# Patient Record
Sex: FEMALE | Race: WHITE | NOT HISPANIC OR LATINO | Employment: OTHER | URBAN - METROPOLITAN AREA
[De-identification: names, ages, dates, MRNs, and addresses within clinical notes are randomized per-mention and may not be internally consistent; named-entity substitution may affect disease eponyms.]

---

## 2017-01-06 ENCOUNTER — OFFICE VISIT (OUTPATIENT)
Dept: FAMILY MEDICINE CLINIC | Facility: CLINIC | Age: 57
End: 2017-01-06

## 2017-01-06 VITALS
HEIGHT: 63 IN | HEART RATE: 96 BPM | OXYGEN SATURATION: 97 % | SYSTOLIC BLOOD PRESSURE: 132 MMHG | BODY MASS INDEX: 29.59 KG/M2 | WEIGHT: 167 LBS | RESPIRATION RATE: 16 BRPM | TEMPERATURE: 98.2 F | DIASTOLIC BLOOD PRESSURE: 74 MMHG

## 2017-01-06 DIAGNOSIS — S93.402D MODERATE LEFT ANKLE SPRAIN, SUBSEQUENT ENCOUNTER: ICD-10-CM

## 2017-01-06 DIAGNOSIS — N64.4 BREAST PAIN, RIGHT: ICD-10-CM

## 2017-01-06 DIAGNOSIS — M25.552 LEFT HIP PAIN: Primary | ICD-10-CM

## 2017-01-06 DIAGNOSIS — E03.8 OTHER SPECIFIED HYPOTHYROIDISM: ICD-10-CM

## 2017-01-06 DIAGNOSIS — IMO0001 DIABETES MELLITUS DUE TO UNDERLYING CONDITION, UNCONTROLLED, WITHOUT COMPLICATION, WITHOUT LONG-TERM CURRENT USE OF INSULIN: ICD-10-CM

## 2017-01-06 LAB — HBA1C MFR BLD: 12 %

## 2017-01-06 PROCEDURE — 99215 OFFICE O/P EST HI 40 MIN: CPT | Performed by: FAMILY MEDICINE

## 2017-01-06 PROCEDURE — 83036 HEMOGLOBIN GLYCOSYLATED A1C: CPT | Performed by: FAMILY MEDICINE

## 2017-01-06 RX ORDER — PRAVASTATIN SODIUM 40 MG
40 TABLET ORAL DAILY
Qty: 30 TABLET | Refills: 3 | Status: SHIPPED | OUTPATIENT
Start: 2017-01-06 | End: 2017-05-25 | Stop reason: SDUPTHER

## 2017-01-06 RX ORDER — IBUPROFEN 800 MG/1
800 TABLET ORAL EVERY 8 HOURS PRN
Qty: 45 TABLET | Refills: 0 | Status: SHIPPED | OUTPATIENT
Start: 2017-01-06 | End: 2018-03-09 | Stop reason: SDUPTHER

## 2017-01-06 RX ORDER — LEVOTHYROXINE SODIUM 0.03 MG/1
25 TABLET ORAL DAILY
Qty: 30 TABLET | Refills: 5 | Status: SHIPPED | OUTPATIENT
Start: 2017-01-06 | End: 2017-01-06 | Stop reason: SDUPTHER

## 2017-01-06 RX ORDER — LEVOTHYROXINE SODIUM 0.03 MG/1
25 TABLET ORAL DAILY
Qty: 30 TABLET | Refills: 5 | Status: SHIPPED | OUTPATIENT
Start: 2017-01-06 | End: 2017-10-20 | Stop reason: SDUPTHER

## 2017-01-06 NOTE — MR AVS SNAPSHOT
Christi Chapa   1/6/2017 9:45 AM   Office Visit    Provider:  Rhonda Gunn DO   Department:  [facility]   Dept Phone:  [phone]                Your Full Care Plan              Today's Medication Changes          These changes are accurate as of: 1/6/17 10:27 AM.  If you have any questions, ask your nurse or doctor.               New Medication(s)Ordered:     ibuprofen 800 MG tablet   Commonly known as:  ADVIL,MOTRIN   Take 1 tablet by mouth Every 8 (Eight) Hours As Needed for moderate pain (4-6).   Started by:  Rhonda Gunn DO       pravastatin 40 MG tablet   Commonly known as:  PRAVACHOL   Take 1 tablet by mouth Daily.   Started by:  Rhonda Gunn DO         Stop taking medication(s)listed here:     cyclobenzaprine 10 MG tablet   Commonly known as:  FLEXERIL   Stopped by:  Rhonda Gunn DO           HYDROcodone-ibuprofen 7.5-200 MG per tablet   Commonly known as:  VICOPROFEN   Stopped by:  Rhonda Gunn DO           meloxicam 15 MG tablet   Commonly known as:  MOBIC   Stopped by:  Rhonda Gunn DO                Where to Get Your Medications      These medications were sent to [address] - [phone] [facility] [phone] [address] [address]     Phone:  [phone]     Dapagliflozin-Metformin HCl ER 5-1000 MG tablet sustained-release 24 hour    ibuprofen 800 MG tablet    levothyroxine 25 MCG tablet    pravastatin 40 MG tablet                  Your Updated Medication List          This list is accurate as of: 1/6/17 10:27 AM.  Always use your most recent med list.                ALPRAZolam 1 MG tablet   Commonly known as:  XANAX       benzonatate 200 MG capsule   Commonly known as:  TESSALON   Take 1 capsule by mouth 2 (Two) Times a Day As Needed for cough.       busPIRone 10 MG tablet   Commonly known as:  BUSPAR       Dapagliflozin-Metformin HCl ER 5-1000 MG tablet sustained-release 24 hour   Commonly known as:  XIGDUO XR   Take 5-1,000 mg by mouth Daily. 1 PO BID  LAST REFILL WITHOUT APPT       desipramine 100 MG tablet   Commonly known as:  NOPRAMIN       DULoxetine 60 MG capsule   Commonly known as:  CYMBALTA       ibuprofen 800 MG tablet   Commonly known as:  ADVIL,MOTRIN   Take 1 tablet by mouth Every 8 (Eight) Hours As Needed for moderate pain (4-6).       levothyroxine 25 MCG tablet   Commonly known as:  SYNTHROID, LEVOTHROID   Take 1 tablet by mouth Daily.       loratadine 10 MG tablet   Commonly known as:  CLARITIN   Take 1 tablet by mouth Daily.       mirtazapine 30 MG tablet   Commonly known as:  REMERON       ondansetron 4 MG tablet   Commonly known as:  ZOFRAN   Take 1 tablet by mouth Every 6 (Six) Hours As Needed for vomiting.       pravastatin 40 MG tablet   Commonly known as:  PRAVACHOL   Take 1 tablet by mouth Daily.       venlafaxine XR 37.5 MG 24 hr capsule   Commonly known as:  EFFEXOR-XR               We Performed the Following     Ambulatory Referral to Orthopedic Surgery     POC Glycosylated Hemoglobin (Hb A1C)       You Were Diagnosed With        Codes Comments    Left hip pain    -  Primary ICD-10-CM: M25.552  ICD-9-CM: 719.45     Moderate left ankle sprain, subsequent encounter     ICD-10-CM: S93.402D  ICD-9-CM: V58.89, 845.00     Diabetes mellitus due to underlying condition, uncontrolled, without complication, without long-term current use of insulin     ICD-10-CM: E08.9, E08.65  ICD-9-CM: 249.01     Other specified hypothyroidism     ICD-10-CM: E03.8  ICD-9-CM: 244.8     Breast pain, right     ICD-10-CM: N64.4  ICD-9-CM: 611.71       Instructions     None    Patient Instructions History      Qloo Signup     [facility] Qloo allows you to send messages to your doctor, view your test results, renew your prescriptions, schedule appointments, and more. To sign up, go to [facility]  "and click on the Sign Up Now link in the New User? box. Enter your Fundly Activation Code exactly as it appears below along with the last four digits of your Social Security Number and your Date of Birth () to complete the sign-up process. If you do not sign up before the expiration date, you must request a new code.    Fundly Activation Code: [email or web address]  Expires: 2017  5:38 AM    If you have questions, you can email [email or web address] or call [phone] to talk to our Fundly staff. Remember, Fundly is NOT to be used for urgent needs. For medical emergencies, dial 911.               Other Info from Your Visit           Your Appointments     2017  9:45 AM EST   Follow Up with Rhonda Gunn DO   [facility]INE (--)    [address]  [facility] [address]   [phone]           Arrive 15 minutes prior to appointment.              Allergies     Atorvastatin      Simvastatin      Sulfa Antibiotics      Trulicity  [Dulaglutide]        Reason for Visit     Ankle Pain     Hip Pain           Vital Signs     Blood Pressure Pulse Temperature Respirations Height Weight    132/74 96 98.2 °F (36.8 °C) 16 63\" (160 cm) 167 lb (75.8 kg)    Oxygen Saturation Body Mass Index Smoking Status             97% 29.58 kg/m2 Former Smoker         Problems and Diagnoses Noted     Underactive thyroid    Left hip pain    -  Primary    Moderate left ankle sprain, subsequent encounter        Diabetes mellitus due to underlying condition, uncontrolled, without complication, without long-term current use of insulin        Breast pain, right          Results     POC Glycosylated Hemoglobin (Hb A1C)      Component Value Standard Range & Units    Hemoglobin A1C 12.0 %                  "

## 2017-01-06 NOTE — PROGRESS NOTES
Subjective   Christi Chapa is a 56 y.o. female.     Diabetes   She presents for her follow-up diabetic visit. She has type 2 (Not currently seeing endo) diabetes mellitus. Her disease course has been fluctuating. There are no hypoglycemic associated symptoms. Pertinent negatives for hypoglycemia include no confusion, dizziness or nervousness/anxiousness. Associated symptoms include foot paresthesias and weight loss. Pertinent negatives for diabetes include no blurred vision, no chest pain, no fatigue, no foot ulcerations, no polydipsia, no polyphagia, no polyuria, no visual change and no weakness. There are no hypoglycemic complications. Symptoms are stable. Diabetic complications include peripheral neuropathy. Risk factors for coronary artery disease include diabetes mellitus, dyslipidemia, hypertension, family history, sedentary lifestyle and post-menopausal. Current diabetic treatment includes oral agent (dual therapy). She is compliant with treatment all of the time. Her weight is decreasing steadily. She is following a diabetic and generally unhealthy diet. When asked about meal planning, she reported none. She has had a previous visit with a dietitian. She participates in exercise intermittently. Her home blood glucose trend is decreasing steadily. An ACE inhibitor/angiotensin II receptor blocker is being taken. She sees a podiatrist.Eye exam is current.   Hyperlipidemia   This is a chronic problem. The current episode started more than 1 year ago. The problem is controlled. Recent lipid tests were reviewed and are low. Exacerbating diseases include hypothyroidism. There are no known factors aggravating her hyperlipidemia. Pertinent negatives include no chest pain, myalgias or shortness of breath. Current antihyperlipidemic treatment includes statins, diet change and fibric acid derivatives. The current treatment provides significant improvement of lipids. There are no compliance problems.  Risk factors for  coronary artery disease include diabetes mellitus, dyslipidemia, family history and hypertension.   Hypothyroidism   This is a chronic problem. The current episode started more than 1 year ago. The problem occurs constantly. The problem has been unchanged. Pertinent negatives include no anorexia, arthralgias, chest pain, chills, congestion, coughing, diaphoresis, fatigue, fever, joint swelling, myalgias, nausea, neck pain, numbness, rash, sore throat, visual change or weakness. Nothing aggravates the symptoms. Treatments tried: on Synthroid. The treatment provided significant relief.   Hypertension   This is a chronic problem. The current episode started more than 1 year ago. The problem has been gradually improving since onset. The problem is controlled. Pertinent negatives include no anxiety, blurred vision, chest pain, malaise/fatigue, neck pain, palpitations, peripheral edema, PND or shortness of breath. (Has lost weight and BP meds lowering BP too much.) There are no associated agents to hypertension. Risk factors for coronary artery disease include diabetes mellitus, dyslipidemia, family history and post-menopausal state. Past treatments include ACE inhibitors. The current treatment provides significant improvement. There are no compliance problems.       Stepped in a hole and twisted left ankle 11/16. Went to [facility] by ambulance because she also hit her chest. Continues to have pain with left ankle. Balance is off. Takes Ibuprofen 800 some relief. Now has pain in left hip because of favoring left ankle. Had ankle xray 12/16.  Has lump and pain in right breast. Due for mammo  The following portions of the patient's history were reviewed and updated as appropriate: allergies, current medications, past family history, past medical history, past social history, past surgical history and problem list.    Review of Systems   Constitutional: Positive for weight loss. Negative for appetite change, chills,  diaphoresis, fatigue, fever, malaise/fatigue and unexpected weight change.   HENT: Negative for congestion, ear pain, mouth sores, postnasal drip, rhinorrhea, sinus pressure, sneezing, sore throat and trouble swallowing.    Eyes: Negative for blurred vision, pain, redness and visual disturbance.   Respiratory: Negative for apnea, cough, chest tightness, shortness of breath and wheezing.    Cardiovascular: Negative for chest pain, palpitations, leg swelling and PND.   Gastrointestinal: Negative for abdominal distention, anorexia, blood in stool, constipation, diarrhea and nausea.   Endocrine: Negative for cold intolerance, polydipsia, polyphagia and polyuria.   Genitourinary: Negative for difficulty urinating, dysuria, enuresis, flank pain and urgency.   Musculoskeletal: Negative for arthralgias, back pain, joint swelling, myalgias and neck pain.   Skin: Negative for color change, rash and wound.   Neurological: Negative for dizziness, syncope, weakness, light-headedness and numbness.   Hematological: Negative for adenopathy.   Psychiatric/Behavioral: Negative for agitation, behavioral problems and confusion. The patient is not nervous/anxious.        Objective   Physical Exam   Constitutional: She is oriented to person, place, and time. She appears well-developed and well-nourished. No distress.   HENT:   Right Ear: External ear normal.   Left Ear: External ear normal.   Nose: Nose normal.   Mouth/Throat: Oropharynx is clear and moist.   Eyes: Conjunctivae and EOM are normal. Pupils are equal, round, and reactive to light.   Neck: Normal range of motion. Neck supple. No thyromegaly present.   Cardiovascular: Normal rate, regular rhythm and normal heart sounds.    No murmur heard.  Pulmonary/Chest: Effort normal and breath sounds normal. No respiratory distress. She has no wheezes. She exhibits no mass. Right breast exhibits no mass. Left breast exhibits no mass.   Right breast tenderness at 7:00   Abdominal: Soft.  Bowel sounds are normal. She exhibits no distension and no mass. There is no tenderness. There is no rebound and no guarding. No hernia.   Musculoskeletal: Normal range of motion. She exhibits no edema or tenderness.   Lymphadenopathy:     She has no cervical adenopathy.   Neurological: She is alert and oriented to person, place, and time. She has normal reflexes.   Skin: Skin is warm and dry. No rash noted. She is not diaphoretic. No erythema. No pallor.   Psychiatric: She has a normal mood and affect. Her behavior is normal. Judgment and thought content normal.   Nursing note and vitals reviewed.      Assessment/Plan   Christi was seen today for ankle pain and hip pain.    Diagnoses and all orders for this visit:    Left hip pain    Moderate left ankle sprain, subsequent encounter  -     Ambulatory Referral to Orthopedic Surgery    Diabetes mellitus due to underlying condition, uncontrolled, without complication, without long-term current use of insulin    Other specified hypothyroidism    Breast pain, right  -     Mammo Diagnostic Bilateral With CAD; Future    Other orders  -     ibuprofen (ADVIL,MOTRIN) 800 MG tablet; Take 1 tablet by mouth Every 8 (Eight) Hours As Needed for moderate pain (4-6).  -     Dapagliflozin-Metformin HCl ER (XIGDUO XR) 5-1000 MG tablet sustained-release 24 hour; Take 5-1,000 mg by mouth Daily. 1 PO BID  LAST REFILL WITHOUT APPT  -     levothyroxine (SYNTHROID, LEVOTHROID) 25 MCG tablet; Take 1 tablet by mouth Daily.    Diabetes is uncontrolled. Patient defers restarting insulin today.  I personally spent over half of a total 45 minutes face to face with the patient in counseling and discussion and/or coordination of care as described above.

## 2017-01-20 ENCOUNTER — HOSPITAL ENCOUNTER (OUTPATIENT)
Dept: ULTRASOUND IMAGING | Facility: HOSPITAL | Age: 57
Discharge: HOME | End: 2017-01-20

## 2017-01-20 ENCOUNTER — HOSPITAL ENCOUNTER (OUTPATIENT)
Dept: MAMMOGRAPHY | Facility: HOSPITAL | Age: 57
Discharge: HOME | End: 2017-01-20
Attending: FAMILY MEDICINE | Admitting: FAMILY MEDICINE

## 2017-01-20 DIAGNOSIS — N64.4 BREAST PAIN, RIGHT: ICD-10-CM

## 2017-01-20 PROCEDURE — 77062 BREAST TOMOSYNTHESIS BI: CPT | Performed by: RADIOLOGY

## 2017-01-20 PROCEDURE — 77066 DX MAMMO INCL CAD BI: CPT | Performed by: RADIOLOGY

## 2017-01-20 PROCEDURE — 76642 ULTRASOUND BREAST LIMITED: CPT | Performed by: RADIOLOGY

## 2017-01-20 PROCEDURE — G0279 TOMOSYNTHESIS, MAMMO: HCPCS

## 2017-01-20 PROCEDURE — G0204 DX MAMMO INCL CAD BI: HCPCS

## 2017-01-20 PROCEDURE — 76642 ULTRASOUND BREAST LIMITED: CPT

## 2017-01-26 ENCOUNTER — OFFICE VISIT (OUTPATIENT)
Dept: FAMILY MEDICINE CLINIC | Facility: CLINIC | Age: 57
End: 2017-01-26

## 2017-01-26 VITALS
OXYGEN SATURATION: 97 % | TEMPERATURE: 97.9 F | HEIGHT: 63 IN | BODY MASS INDEX: 29.77 KG/M2 | HEART RATE: 90 BPM | WEIGHT: 168 LBS | DIASTOLIC BLOOD PRESSURE: 72 MMHG | RESPIRATION RATE: 16 BRPM | SYSTOLIC BLOOD PRESSURE: 130 MMHG

## 2017-01-26 DIAGNOSIS — R30.0 DYSURIA: Primary | ICD-10-CM

## 2017-01-26 DIAGNOSIS — R31.9 HEMATURIA: ICD-10-CM

## 2017-01-26 DIAGNOSIS — N39.0 URINARY TRACT INFECTION, SITE UNSPECIFIED: ICD-10-CM

## 2017-01-26 DIAGNOSIS — Z87.442 HISTORY OF KIDNEY STONES: ICD-10-CM

## 2017-01-26 LAB
BILIRUB BLD-MCNC: NEGATIVE MG/DL
CLARITY, POC: ABNORMAL
COLOR UR: ABNORMAL
GLUCOSE UR STRIP-MCNC: ABNORMAL MG/DL
KETONES UR QL: ABNORMAL
LEUKOCYTE EST, POC: ABNORMAL
NITRITE UR-MCNC: NEGATIVE MG/ML
PH UR: 6.5 [PH] (ref 5–8)
PROT UR STRIP-MCNC: NEGATIVE MG/DL
RBC # UR STRIP: ABNORMAL /UL
SP GR UR: 1.02 (ref 1–1.03)
UROBILINOGEN UR QL: NORMAL

## 2017-01-26 PROCEDURE — 87086 URINE CULTURE/COLONY COUNT: CPT | Performed by: FAMILY MEDICINE

## 2017-01-26 PROCEDURE — 81003 URINALYSIS AUTO W/O SCOPE: CPT | Performed by: FAMILY MEDICINE

## 2017-01-26 PROCEDURE — 99213 OFFICE O/P EST LOW 20 MIN: CPT | Performed by: FAMILY MEDICINE

## 2017-01-26 RX ORDER — NITROFURANTOIN 25; 75 MG/1; MG/1
100 CAPSULE ORAL 2 TIMES DAILY
Qty: 20 CAPSULE | Refills: 0 | Status: SHIPPED | OUTPATIENT
Start: 2017-01-26 | End: 2017-02-14

## 2017-01-26 NOTE — MR AVS SNAPSHOT
Christi Chapa   1/26/2017 12:00 PM   Office Visit    Provider:  Rhonda Gunn DO   Department:  [facility]   Dept Phone:  [phone]                Your Full Care Plan              Today's Medication Changes          These changes are accurate as of: 1/26/17 12:53 PM.  If you have any questions, ask your nurse or doctor.               New Medication(s)Ordered:     nitrofurantoin (macrocrystal-monohydrate) 100 MG capsule   Commonly known as:  MACROBID   Take 1 capsule by mouth 2 (Two) Times a Day.   Started by:  Rhonda Gunn DO         Stop taking medication(s)listed here:     benzonatate 200 MG capsule   Commonly known as:  TESSALON   Stopped by:  Rhonda Gunn DO                Where to Get Your Medications      These medications were sent to [address] - [facility] - [phone]  - [phone] [address] [address]     Phone:  [phone]     nitrofurantoin (macrocrystal-monohydrate) 100 MG capsule                  Your Updated Medication List          This list is accurate as of: 1/26/17 12:53 PM.  Always use your most recent med list.                ALPRAZolam 1 MG tablet   Commonly known as:  XANAX       busPIRone 10 MG tablet   Commonly known as:  BUSPAR       Dapagliflozin-Metformin HCl ER 5-1000 MG tablet sustained-release 24 hour   Commonly known as:  XIGDUO XR   Take 5-1,000 mg by mouth Daily. 1 PO BID  LAST REFILL WITHOUT APPT       desipramine 100 MG tablet   Commonly known as:  NOPRAMIN       DULoxetine 60 MG capsule   Commonly known as:  CYMBALTA       ibuprofen 800 MG tablet   Commonly known as:  ADVIL,MOTRIN   Take 1 tablet by mouth Every 8 (Eight) Hours As Needed for moderate pain (4-6).       levothyroxine 25 MCG tablet   Commonly known as:  SYNTHROID, LEVOTHROID   Take 1 tablet by mouth Daily.       loratadine 10 MG  tablet   Commonly known as:  CLARITIN   Take 1 tablet by mouth Daily.       mirtazapine 30 MG tablet   Commonly known as:  REMERON       nitrofurantoin (macrocrystal-monohydrate) 100 MG capsule   Commonly known as:  MACROBID   Take 1 capsule by mouth 2 (Two) Times a Day.       ondansetron 4 MG tablet   Commonly known as:  ZOFRAN   Take 1 tablet by mouth Every 6 (Six) Hours As Needed for vomiting.       pravastatin 40 MG tablet   Commonly known as:  PRAVACHOL   Take 1 tablet by mouth Daily.       venlafaxine XR 37.5 MG 24 hr capsule   Commonly known as:  EFFEXOR-XR               We Performed the Following     Ambulatory Referral to Urology     POC Urinalysis Dipstick, Automated     Urine Culture       You Were Diagnosed With        Codes Comments    Dysuria    -  Primary ICD-10-CM: R30.0  ICD-9-CM: 788.1     Hematuria     ICD-10-CM: R31.9  ICD-9-CM: 599.70     Urinary tract infection, site unspecified     ICD-10-CM: N39.0     History of kidney stones     ICD-10-CM: Z87.442  ICD-9-CM: V13.01       Instructions     None    Patient Instructions History      MyChart Signup     [facility] [employer] allows you to send messages to your doctor, view your test results, renew your prescriptions, schedule appointments, and more. To sign up, go to Web[employer] and click on the Sign Up Now link in the New User? box. Enter your [employer] Activation Code exactly as it appears below along with the last four digits of your Social Security Number and your Date of Birth () to complete the sign-up process. If you do not sign up before the expiration date, you must request a new code.    W[employer] Activation Code: [email or web address]  Expires: 2017 12:53 PM    If you have questions, you can email [email or web address] or call [phone] to talk to our [employer] staff. Remember, [employer] is NOT to be used for urgent needs. For medical emergencies, dial 911.               Other Info from Your Visit           Your  "Appointments     Feb 03, 2017  9:45 AM EST   Follow Up with Rhonda Gunn,    [facility]ICINE (--)    [address]  [facility] [address] [phone]           Arrive 15 minutes prior to appointment.              Allergies     Atorvastatin      Simvastatin      Sulfa Antibiotics      Trulicity  [Dulaglutide]        Reason for Visit     Urinary Tract Infection     Difficulty Urinating           Vital Signs     Blood Pressure Pulse Temperature Respirations Height Weight    130/72 90 97.9 °F (36.6 °C) 16 63\" (160 cm) 168 lb (76.2 kg)    Last Menstrual Period Oxygen Saturation Body Mass Index Smoking Status          (Approximate) 97% 29.76 kg/m2 Former Smoker        Problems and Diagnoses Noted     Difficult or painful urination    -  Primary    Blood in urine        Urinary tract infection        History of kidney stones          Results     POC Urinalysis Dipstick, Automated      Component Value Standard Range & Units    Color Dark Yellow Yellow, Straw, Dark Yellow, Negar    Clarity, UA Cloudy Clear    Glucose,  mg/dL Negative, 1000 mg/dL (3+) mg/dL    Bilirubin Negative Negative    Ketones, UA 40 mg/dL Negative    Specific Gravity  1.020 1.005 - 1.030    Blood, UA Trace Negative    pH, Urine 6.5 5.0 - 8.0    Protein, POC Negative Negative mg/dL    Urobilinogen, UA Normal Normal    Leukocytes Small (1+) Negative    Nitrite, UA Negative Negative                  "

## 2017-01-26 NOTE — PROGRESS NOTES
"Subjective   Christi Chapa is a 56 y.o. female.     Difficulty Urinating    This is a recurrent (\"I think I am having a kidney stone\") problem. The current episode started in the past 7 days. The problem occurs every urination. The problem has been unchanged. The quality of the pain is described as aching and stabbing. The pain is at a severity of 2/10. The pain is moderate. There has been no fever. She is sexually active. There is no history of pyelonephritis. Associated symptoms include flank pain. Pertinent negatives include no chills, nausea or urgency. She has tried nothing for the symptoms. The treatment provided no relief. Her past medical history is significant for kidney stones.        The following portions of the patient's history were reviewed and updated as appropriate: allergies, current medications, past family history, past medical history, past social history, past surgical history and problem list.    Review of Systems   Constitutional: Negative for appetite change, chills, diaphoresis, fatigue, fever and unexpected weight change.   HENT: Negative for congestion, ear pain, mouth sores, postnasal drip, rhinorrhea, sinus pressure, sneezing, sore throat and trouble swallowing.    Eyes: Negative for pain, redness and visual disturbance.   Respiratory: Negative for apnea, cough, chest tightness, shortness of breath and wheezing.    Cardiovascular: Negative for chest pain, palpitations and leg swelling.   Gastrointestinal: Negative for abdominal distention, blood in stool, constipation, diarrhea and nausea.   Endocrine: Negative for cold intolerance, polydipsia, polyphagia and polyuria.   Genitourinary: Positive for dysuria and flank pain. Negative for difficulty urinating, enuresis and urgency.   Musculoskeletal: Negative for arthralgias, back pain, joint swelling, myalgias and neck pain.   Skin: Negative for color change, rash and wound.   Neurological: Negative for dizziness, syncope, weakness, " light-headedness and numbness.   Hematological: Negative for adenopathy.   Psychiatric/Behavioral: Negative for agitation, behavioral problems and confusion. The patient is not nervous/anxious.        Objective   Physical Exam   Constitutional: She is oriented to person, place, and time. She appears well-developed and well-nourished. No distress.   HENT:   Right Ear: External ear normal.   Left Ear: External ear normal.   Nose: Nose normal.   Mouth/Throat: Oropharynx is clear and moist.   Eyes: Conjunctivae and EOM are normal. Pupils are equal, round, and reactive to light.   Neck: Normal range of motion. Neck supple. No thyromegaly present.   Cardiovascular: Normal rate, regular rhythm and normal heart sounds.    No murmur heard.  Pulmonary/Chest: Effort normal and breath sounds normal. No respiratory distress. She has no wheezes.   Abdominal: Soft. Bowel sounds are normal. She exhibits no distension and no mass. There is no tenderness. There is no rebound and no guarding. No hernia.   Musculoskeletal: Normal range of motion. She exhibits no edema or tenderness.   Lymphadenopathy:     She has no cervical adenopathy.   Neurological: She is alert and oriented to person, place, and time. She has normal reflexes.   Skin: Skin is warm and dry. No rash noted. She is not diaphoretic. No erythema. No pallor.   Psychiatric: She has a normal mood and affect. Her behavior is normal. Judgment and thought content normal.   Nursing note and vitals reviewed.    UA obtained:+ Glucose, leukocytes, blood, ketones  Assessment/Plan   Christi was seen today for urinary tract infection and difficulty urinating.    Diagnoses and all orders for this visit:    Dysuria  -     POC Urinalysis Dipstick, Automated  -     Urine Culture  -     Ambulatory Referral to Urology    Hematuria    Urinary tract infection, site unspecified    History of kidney stones    Other orders  -     nitrofurantoin, macrocrystal-monohydrate, (MACROBID) 100 MG capsule;  Take 1 capsule by mouth 2 (Two) Times a Day.

## 2017-01-28 LAB — BACTERIA SPEC AEROBE CULT: NORMAL

## 2017-02-14 ENCOUNTER — OFFICE VISIT (OUTPATIENT)
Dept: FAMILY MEDICINE CLINIC | Facility: CLINIC | Age: 57
End: 2017-02-14

## 2017-02-14 VITALS
SYSTOLIC BLOOD PRESSURE: 124 MMHG | WEIGHT: 167 LBS | HEART RATE: 120 BPM | DIASTOLIC BLOOD PRESSURE: 80 MMHG | TEMPERATURE: 97.8 F | RESPIRATION RATE: 16 BRPM | BODY MASS INDEX: 29.58 KG/M2

## 2017-02-14 DIAGNOSIS — M79.7 FIBROMYALGIA: Primary | ICD-10-CM

## 2017-02-14 DIAGNOSIS — R30.0 DYSURIA: ICD-10-CM

## 2017-02-14 DIAGNOSIS — R53.83 FATIGUE, UNSPECIFIED TYPE: ICD-10-CM

## 2017-02-14 LAB
BILIRUB BLD-MCNC: NEGATIVE MG/DL
CLARITY, POC: CLEAR
COLOR UR: YELLOW
GLUCOSE UR STRIP-MCNC: ABNORMAL MG/DL
KETONES UR QL: ABNORMAL
LEUKOCYTE EST, POC: NEGATIVE
NITRITE UR-MCNC: NEGATIVE MG/ML
PH UR: 5.5 [PH] (ref 5–8)
PROT UR STRIP-MCNC: ABNORMAL MG/DL
RBC # UR STRIP: ABNORMAL /UL
SP GR UR: 1.01 (ref 1–1.03)
UROBILINOGEN UR QL: NORMAL

## 2017-02-14 PROCEDURE — 81003 URINALYSIS AUTO W/O SCOPE: CPT | Performed by: FAMILY MEDICINE

## 2017-02-14 PROCEDURE — 99213 OFFICE O/P EST LOW 20 MIN: CPT | Performed by: FAMILY MEDICINE

## 2017-02-14 RX ORDER — METHOCARBAMOL 500 MG/1
500 TABLET, FILM COATED ORAL 2 TIMES DAILY PRN
Qty: 30 TABLET | Refills: 0 | Status: SHIPPED | OUTPATIENT
Start: 2017-02-14 | End: 2018-03-09

## 2017-02-14 NOTE — PROGRESS NOTES
Subjective   Christi Chapa is a 56 y.o. female.     History of Present Illness   One week of aches and body discomfort, no fever or chills.  Known kidney stones.   Sleeping OK. Appetite good.  Does not test home glucose. No gross hematuria. Bowels slow. No flu shot.  The following portions of the patient's history were reviewed and updated as appropriate: allergies, current medications, past family history, past medical history, past social history, past surgical history and problem list.    Review of Systems   Constitutional: Negative.    Eyes: Negative.    Respiratory: Negative.    Cardiovascular: Negative.    Gastrointestinal: Negative.    Endocrine: Negative.    Musculoskeletal: Positive for back pain.   Skin: Negative.        Objective   Physical Exam   Constitutional: She appears well-developed and well-nourished. No distress.   Moves slowly   HENT:   Head: Normocephalic.   Neck: Neck supple. No thyromegaly present.   Cardiovascular: Normal rate.    Pulmonary/Chest: Effort normal and breath sounds normal.   Abdominal: Soft. There is no tenderness. There is no rebound and no CVA tenderness.   Musculoskeletal: She exhibits no edema.   Lymphadenopathy:     She has no cervical adenopathy.   Neurological: She is alert. She displays normal reflexes.   Vitals reviewed.      Assessment/Plan   Christi was seen today for generalized body aches.    Diagnoses and all orders for this visit:    Fibromyalgia  -     methocarbamol (ROBAXIN) 500 MG tablet; Take 1 tablet by mouth 2 (Two) Times a Day As Needed for muscle spasms.    Dysuria  -     POCT urinalysis dipstick, automated    Fatigue, unspecified type

## 2017-02-24 ENCOUNTER — OFFICE VISIT (OUTPATIENT)
Dept: FAMILY MEDICINE CLINIC | Facility: CLINIC | Age: 57
End: 2017-02-24

## 2017-02-24 VITALS
SYSTOLIC BLOOD PRESSURE: 126 MMHG | OXYGEN SATURATION: 97 % | RESPIRATION RATE: 16 BRPM | HEART RATE: 96 BPM | WEIGHT: 172 LBS | DIASTOLIC BLOOD PRESSURE: 84 MMHG | TEMPERATURE: 97.9 F | BODY MASS INDEX: 30.48 KG/M2 | HEIGHT: 63 IN

## 2017-02-24 DIAGNOSIS — M79.7 FIBROMYALGIA: ICD-10-CM

## 2017-02-24 DIAGNOSIS — R42 DIZZINESS: ICD-10-CM

## 2017-02-24 DIAGNOSIS — E08.00 DIABETES MELLITUS DUE TO UNDERLYING CONDITION, UNCONTROLLED, WITH HYPEROSMOLARITY WITHOUT COMA, WITHOUT LONG-TERM CURRENT USE OF INSULIN (HCC): ICD-10-CM

## 2017-02-24 DIAGNOSIS — R30.0 DYSURIA: Primary | ICD-10-CM

## 2017-02-24 LAB
ALBUMIN SERPL-MCNC: 4.4 G/DL (ref 3.2–4.8)
ALBUMIN/GLOB SERPL: 1.5 G/DL (ref 1.5–2.5)
ALP SERPL-CCNC: 84 U/L (ref 25–100)
ALT SERPL W P-5'-P-CCNC: 26 U/L (ref 7–40)
ANION GAP SERPL CALCULATED.3IONS-SCNC: 10 MMOL/L (ref 3–11)
ARTICHOKE IGE QN: 79 MG/DL (ref 0–130)
AST SERPL-CCNC: 19 U/L (ref 0–33)
BASOPHILS # BLD AUTO: 0.02 10*3/MM3 (ref 0–0.2)
BASOPHILS NFR BLD AUTO: 0.4 % (ref 0–1)
BILIRUB BLD-MCNC: NEGATIVE MG/DL
BILIRUB SERPL-MCNC: 0.4 MG/DL (ref 0.3–1.2)
BUN BLD-MCNC: 11 MG/DL (ref 9–23)
BUN/CREAT SERPL: 15.7 (ref 7–25)
CALCIUM SPEC-SCNC: 9.9 MG/DL (ref 8.7–10.4)
CHLORIDE SERPL-SCNC: 93 MMOL/L (ref 99–109)
CHOLEST SERPL-MCNC: 323 MG/DL (ref 0–200)
CLARITY, POC: CLEAR
CO2 SERPL-SCNC: 30 MMOL/L (ref 20–31)
COLOR UR: YELLOW
CREAT BLD-MCNC: 0.7 MG/DL (ref 0.6–1.3)
DEPRECATED RDW RBC AUTO: 44.6 FL (ref 37–54)
EOSINOPHIL # BLD AUTO: 0.04 10*3/MM3 (ref 0.1–0.3)
EOSINOPHIL NFR BLD AUTO: 0.7 % (ref 0–3)
ERYTHROCYTE [DISTWIDTH] IN BLOOD BY AUTOMATED COUNT: 14.4 % (ref 11.3–14.5)
GFR SERPL CREATININE-BSD FRML MDRD: 86 ML/MIN/1.73
GLOBULIN UR ELPH-MCNC: 3 GM/DL
GLUCOSE BLD-MCNC: 466 MG/DL (ref 70–100)
GLUCOSE UR STRIP-MCNC: ABNORMAL MG/DL
HCT VFR BLD AUTO: 43.2 % (ref 34.5–44)
HDLC SERPL-MCNC: 37 MG/DL (ref 40–60)
HGB BLD-MCNC: 14 G/DL (ref 11.5–15.5)
IMM GRANULOCYTES # BLD: 0.01 10*3/MM3 (ref 0–0.03)
IMM GRANULOCYTES NFR BLD: 0.2 % (ref 0–0.6)
KETONES UR QL: ABNORMAL
LEUKOCYTE EST, POC: NEGATIVE
LYMPHOCYTES # BLD AUTO: 1.78 10*3/MM3 (ref 0.6–4.8)
LYMPHOCYTES NFR BLD AUTO: 31.5 % (ref 24–44)
MCH RBC QN AUTO: 27.5 PG (ref 27–31)
MCHC RBC AUTO-ENTMCNC: 32.4 G/DL (ref 32–36)
MCV RBC AUTO: 84.9 FL (ref 80–99)
MONOCYTES # BLD AUTO: 0.34 10*3/MM3 (ref 0–1)
MONOCYTES NFR BLD AUTO: 6 % (ref 0–12)
NEUTROPHILS # BLD AUTO: 3.46 10*3/MM3 (ref 1.5–8.3)
NEUTROPHILS NFR BLD AUTO: 61.2 % (ref 41–71)
NITRITE UR-MCNC: NEGATIVE MG/ML
PH UR: 5.5 [PH] (ref 5–8)
PLATELET # BLD AUTO: 193 10*3/MM3 (ref 150–450)
PMV BLD AUTO: 10.4 FL (ref 6–12)
POTASSIUM BLD-SCNC: 4.3 MMOL/L (ref 3.5–5.5)
PROT SERPL-MCNC: 7.4 G/DL (ref 5.7–8.2)
PROT UR STRIP-MCNC: NEGATIVE MG/DL
RBC # BLD AUTO: 5.09 10*6/MM3 (ref 3.89–5.14)
RBC # UR STRIP: NEGATIVE /UL
SODIUM BLD-SCNC: 133 MMOL/L (ref 132–146)
SP GR UR: 1.01 (ref 1–1.03)
TRIGL SERPL-MCNC: >1100 MG/DL (ref 0–150)
UROBILINOGEN UR QL: NORMAL
WBC NRBC COR # BLD: 5.65 10*3/MM3 (ref 3.5–10.8)

## 2017-02-24 PROCEDURE — 87086 URINE CULTURE/COLONY COUNT: CPT | Performed by: FAMILY MEDICINE

## 2017-02-24 PROCEDURE — 80053 COMPREHEN METABOLIC PANEL: CPT | Performed by: FAMILY MEDICINE

## 2017-02-24 PROCEDURE — 36415 COLL VENOUS BLD VENIPUNCTURE: CPT | Performed by: FAMILY MEDICINE

## 2017-02-24 PROCEDURE — 85025 COMPLETE CBC W/AUTO DIFF WBC: CPT | Performed by: FAMILY MEDICINE

## 2017-02-24 PROCEDURE — 80061 LIPID PANEL: CPT | Performed by: FAMILY MEDICINE

## 2017-02-24 PROCEDURE — 99214 OFFICE O/P EST MOD 30 MIN: CPT | Performed by: FAMILY MEDICINE

## 2017-02-24 PROCEDURE — 81002 URINALYSIS NONAUTO W/O SCOPE: CPT | Performed by: FAMILY MEDICINE

## 2017-02-24 NOTE — PROGRESS NOTES
"Subjective   Christi Chapa is a 56 y.o. female.   \"Having a hard time with fibromyalgia\" Pt takes Cymbalta and recently tried Robaxin little relief. Pt sees psychiatry.  Has diabetes and blood sugar not well controlled. Does not watch diet.  Currently not seeing endocrine.  Pt needs something for work stating that she has fibromyalgia flares.  Fibromyalgia   This is a recurrent problem. The current episode started more than 1 year ago. The problem occurs constantly. The problem has been gradually worsening. Associated symptoms include arthralgias, fatigue, joint swelling and myalgias. Pertinent negatives include no chest pain, chills, congestion, coughing, diaphoresis, fever, nausea, neck pain, numbness, rash, sore throat or weakness. Nothing aggravates the symptoms. Treatments tried: Cymbalta, Robaxin. The treatment provided no relief.   Dizziness   This is a new problem. The current episode started 1 to 4 weeks ago. The problem occurs intermittently. The problem has been unchanged. Associated symptoms include arthralgias, fatigue, joint swelling and myalgias. Pertinent negatives include no chest pain, chills, congestion, coughing, diaphoresis, fever, nausea, neck pain, numbness, rash, sore throat or weakness. The symptoms are aggravated by standing. She has tried nothing for the symptoms. The treatment provided no relief.   Difficulty Urinating   This is a recurrent (Has a history of kidney stones. Has not seen urology) problem. The current episode started more than 1 month ago. The problem has been unchanged. Associated symptoms include arthralgias, fatigue, joint swelling and myalgias. Pertinent negatives include no chest pain, chills, congestion, coughing, diaphoresis, fever, nausea, neck pain, numbness, rash, sore throat or weakness. Nothing aggravates the symptoms. She has tried nothing for the symptoms. The treatment provided no relief.        The following portions of the patient's history were reviewed " and updated as appropriate: allergies, current medications, past family history, past medical history, past social history, past surgical history and problem list.    Review of Systems   Constitutional: Positive for fatigue. Negative for appetite change, chills, diaphoresis, fever and unexpected weight change.   HENT: Negative for congestion, ear pain, mouth sores, postnasal drip, rhinorrhea, sinus pressure, sneezing, sore throat and trouble swallowing.    Eyes: Negative for pain, redness and visual disturbance.   Respiratory: Negative for apnea, cough, chest tightness, shortness of breath and wheezing.    Cardiovascular: Negative for chest pain, palpitations and leg swelling.   Gastrointestinal: Negative for abdominal distention, blood in stool, constipation, diarrhea and nausea.   Endocrine: Negative for cold intolerance, polydipsia, polyphagia and polyuria.   Genitourinary: Positive for difficulty urinating. Negative for dysuria, enuresis, flank pain and urgency.   Musculoskeletal: Positive for arthralgias, joint swelling and myalgias. Negative for back pain and neck pain.   Skin: Negative for color change, rash and wound.   Neurological: Positive for dizziness. Negative for syncope, weakness, light-headedness and numbness.   Hematological: Negative for adenopathy.   Psychiatric/Behavioral: Negative for agitation, behavioral problems and confusion. The patient is not nervous/anxious.        Objective   Physical Exam   Constitutional: She is oriented to person, place, and time. She appears well-developed and well-nourished. No distress.   HENT:   Right Ear: External ear normal.   Left Ear: External ear normal.   Nose: Nose normal.   Mouth/Throat: Oropharynx is clear and moist.   Eyes: Conjunctivae and EOM are normal. Pupils are equal, round, and reactive to light.   Neck: Normal range of motion. Neck supple. No thyromegaly present.   Cardiovascular: Normal rate, regular rhythm and normal heart sounds.    No murmur  heard.  Pulmonary/Chest: Effort normal and breath sounds normal. No respiratory distress. She has no wheezes.   Abdominal: Soft. Bowel sounds are normal. She exhibits no distension and no mass. There is no tenderness. There is no rebound and no guarding. No hernia.   Musculoskeletal: Normal range of motion. She exhibits no edema or tenderness.   Lymphadenopathy:     She has no cervical adenopathy.   Neurological: She is alert and oriented to person, place, and time. She has normal reflexes.   Skin: Skin is warm and dry. No rash noted. She is not diaphoretic. No erythema. No pallor.   Psychiatric: She has a normal mood and affect. Her behavior is normal. Judgment and thought content normal.   Nursing note and vitals reviewed.      Assessment/Plan   Christi was seen today for fibromyalgia, dizziness and difficulty urinating.    Diagnoses and all orders for this visit:    Dysuria  -     POC Urinalysis Dipstick  -     Ambulatory Referral to Urology  -     Urine Culture    Diabetes mellitus due to underlying condition, uncontrolled, with hyperosmolarity without coma, without long-term current use of insulin  -     Ambulatory Referral to Endocrinology  -     CBC & Differential  -     Comprehensive Metabolic Panel  -     Lipid Panel  -     CBC Auto Differential    Fibromyalgia    Dizziness      Patient has uncontrolled diabetes and is noncompliant. She needs probable insulin and will be referred to endocrinology.  Pt has recently seen GYN and has Diflucan to take. Pt needs referred back to urology.

## 2017-02-26 LAB — BACTERIA SPEC AEROBE CULT: NORMAL

## 2017-02-27 RX ORDER — FENOFIBRATE 145 MG/1
145 TABLET, COATED ORAL DAILY
Qty: 30 TABLET | Refills: 3 | Status: SHIPPED | OUTPATIENT
Start: 2017-02-27 | End: 2017-05-25 | Stop reason: SDUPTHER

## 2017-03-17 ENCOUNTER — TELEPHONE (OUTPATIENT)
Dept: FAMILY MEDICINE CLINIC | Facility: CLINIC | Age: 57
End: 2017-03-17

## 2017-03-17 NOTE — TELEPHONE ENCOUNTER
----- Message from Rhonda Gunn DO sent at 3/17/2017  2:08 PM EDT -----  Contact: [phone]  Yes    ----- Message -----     From: Amaris Ulrich MA     Sent: 3/17/2017   1:51 PM       To: Rhonda Gunn, DO     Pharmacist wants to know if you want her to take both of these ?   ----- Message -----     From: Georgie Moran     Sent: 3/17/2017   1:31 PM       To: Amaris Ulrich MA    PHARMACY NEEDS CLARIFICATION CONCERNING ON MEDICATIONS FENOFIBRATE AND PRAVASTATIN     GONZÁLEZ LOMAS

## 2017-03-29 ENCOUNTER — OFFICE VISIT (OUTPATIENT)
Dept: FAMILY MEDICINE CLINIC | Facility: CLINIC | Age: 57
End: 2017-03-29

## 2017-03-29 VITALS
DIASTOLIC BLOOD PRESSURE: 78 MMHG | OXYGEN SATURATION: 96 % | HEIGHT: 63 IN | BODY MASS INDEX: 28.7 KG/M2 | WEIGHT: 162 LBS | HEART RATE: 96 BPM | SYSTOLIC BLOOD PRESSURE: 118 MMHG | TEMPERATURE: 97.5 F

## 2017-03-29 DIAGNOSIS — J01.80 OTHER ACUTE SINUSITIS, RECURRENCE NOT SPECIFIED: Primary | ICD-10-CM

## 2017-03-29 PROCEDURE — 99213 OFFICE O/P EST LOW 20 MIN: CPT | Performed by: NURSE PRACTITIONER

## 2017-03-29 RX ORDER — AMOXICILLIN AND CLAVULANATE POTASSIUM 875; 125 MG/1; MG/1
1 TABLET, FILM COATED ORAL 2 TIMES DAILY
Qty: 20 TABLET | Refills: 0 | Status: SHIPPED | OUTPATIENT
Start: 2017-03-29 | End: 2017-05-25

## 2017-03-29 NOTE — PROGRESS NOTES
Subjective   Christi Chapa is a 56 y.o. female.     History of Present Illness Patient with 2 week history of sinus pressure, nasal congestion, otalgia, sore throat, and cough productive of yellow to white sputum.  No fevers but has had chills. Using essential oils to treat herself.    The following portions of the patient's history were reviewed and updated as appropriate: allergies, current medications, past family history, past medical history, past social history, past surgical history and problem list.    Review of Systems   Constitutional: Positive for chills. Negative for fatigue, fever and unexpected weight change.   HENT: Positive for congestion, ear pain, postnasal drip, rhinorrhea and sore throat. Negative for hearing loss, nosebleeds, trouble swallowing and voice change.    Eyes: Negative for pain, discharge, redness and visual disturbance.   Respiratory: Positive for cough. Negative for chest tightness, shortness of breath and wheezing.    Cardiovascular: Negative for chest pain, palpitations and leg swelling.   Gastrointestinal: Negative for abdominal distention, abdominal pain, anal bleeding, blood in stool, constipation, diarrhea, nausea and vomiting.   Endocrine: Negative for cold intolerance, heat intolerance, polydipsia, polyphagia and polyuria.   Genitourinary: Negative for dysuria, flank pain, frequency and hematuria.   Musculoskeletal: Negative for arthralgias, gait problem, joint swelling and myalgias.   Skin: Negative for color change and rash.   Neurological: Negative for dizziness, tremors, seizures, syncope, speech difficulty, weakness, numbness and headaches.   Hematological: Negative.    Psychiatric/Behavioral: Negative.        Objective   Physical Exam   Constitutional: She is oriented to person, place, and time. She appears well-developed and well-nourished. No distress.   HENT:   Head: Normocephalic and atraumatic.   Right Ear: Tympanic membrane and external ear normal.   Left Ear:  Tympanic membrane and external ear normal.   Nose: Nose normal.   Mouth/Throat: Oropharynx is clear and moist. No oropharyngeal exudate.   Eyes: Conjunctivae are normal. Pupils are equal, round, and reactive to light. Right eye exhibits no discharge. Left eye exhibits no discharge. No scleral icterus.   Neck: Neck supple. No tracheal deviation present. No thyromegaly present.   Cardiovascular: Normal rate, regular rhythm and normal heart sounds.  Exam reveals no gallop and no friction rub.    No murmur heard.  Pulmonary/Chest: Effort normal and breath sounds normal. No respiratory distress. She has no wheezes.   Abdominal: Soft. Bowel sounds are normal. She exhibits no distension and no mass. There is no tenderness.   Musculoskeletal: She exhibits no edema or deformity.   Lymphadenopathy:     She has no cervical adenopathy.   Neurological: She is alert and oriented to person, place, and time. Coordination normal.   Skin: Skin is warm and dry. No rash noted. No erythema.   Psychiatric: She has a normal mood and affect. Her speech is normal and behavior is normal. Judgment and thought content normal.   Nursing note and vitals reviewed.      Assessment/Plan   Christi was seen today for uri.    Diagnoses and all orders for this visit:    Other acute sinusitis, recurrence not specified  -     amoxicillin-clavulanate (AUGMENTIN) 875-125 MG per tablet; Take 1 tablet by mouth 2 (Two) Times a Day.      Increase fluids and symptomatic treatment. Follow up symptoms persist or worsen.

## 2017-04-19 ENCOUNTER — APPOINTMENT (OUTPATIENT)
Dept: GENERAL RADIOLOGY | Facility: HOSPITAL | Age: 57
End: 2017-04-19

## 2017-04-19 ENCOUNTER — HOSPITAL ENCOUNTER (EMERGENCY)
Facility: HOSPITAL | Age: 57
Discharge: HOME | End: 2017-04-19
Attending: EMERGENCY MEDICINE | Admitting: EMERGENCY MEDICINE

## 2017-04-19 ENCOUNTER — APPOINTMENT (OUTPATIENT)
Dept: GENERAL RADIOLOGY | Facility: HOSPITAL | Age: 57
End: 2017-04-19
Attending: EMERGENCY MEDICINE

## 2017-04-19 VITALS
HEART RATE: 84 BPM | BODY MASS INDEX: 29.44 KG/M2 | SYSTOLIC BLOOD PRESSURE: 129 MMHG | OXYGEN SATURATION: 94 % | HEIGHT: 62 IN | TEMPERATURE: 98.5 F | DIASTOLIC BLOOD PRESSURE: 63 MMHG | WEIGHT: 160 LBS | RESPIRATION RATE: 16 BRPM

## 2017-04-19 DIAGNOSIS — S92.354A CLOSED NONDISPLACED FRACTURE OF FIFTH METATARSAL BONE OF RIGHT FOOT, INITIAL ENCOUNTER: Primary | ICD-10-CM

## 2017-04-19 PROCEDURE — 99284 EMERGENCY DEPT VISIT MOD MDM: CPT

## 2017-04-19 PROCEDURE — 73630 X-RAY EXAM OF FOOT: CPT

## 2017-04-19 RX ORDER — HYDROCODONE BITARTRATE AND ACETAMINOPHEN 5; 325 MG/1; MG/1
1 TABLET ORAL ONCE
Status: COMPLETED | OUTPATIENT
Start: 2017-04-19 | End: 2017-04-19

## 2017-04-19 RX ORDER — HYDROCODONE BITARTRATE AND ACETAMINOPHEN 7.5; 325 MG/1; MG/1
1 TABLET ORAL EVERY 6 HOURS PRN
Qty: 12 TABLET | Refills: 0 | Status: SHIPPED | OUTPATIENT
Start: 2017-04-19 | End: 2017-05-25

## 2017-04-19 RX ADMIN — HYDROCODONE BITARTRATE AND ACETAMINOPHEN 1 TABLET: 5; 325 TABLET ORAL at 13:48

## 2017-04-19 NOTE — SIGNIFICANT NOTE
Assisting PT into wheelchair.  Pt requesting seat of wheelchair cleaned.   PT states she lost her balance using crutches, fell hitting left foot and left elbow.  PT complaining of left foot pain and left elbow pain.  PT assisted to sitting position on floor and then lifted by staff X3 to wheelchair.  Assessment performed no wounds, redness or bruising noted. PT denies hitting head.   SUSHILA Moctezuma notified and coming to bedside to evaluate.

## 2017-04-19 NOTE — ED PROVIDER NOTES
Subjective   HPI Comments: PT PLAYING IN YARD WITH CHILDREN LAST NIGHT TWISTED HER RIGHT FOOT LAST PM . SHE THINKS SHE STEPPED INTO A HOLE. PT DENIES ANY ANKLE PAIN, KNEE PAIN OR OTHER COMPLAINTS.     Patient is a 56 y.o. female presenting with lower extremity pain.   History provided by:  Patient  Lower Extremity Issue   Location:  Foot  Foot location:  R foot  Pain details:     Quality:  Dull    Onset quality:  Sudden    Timing:  Constant  Chronicity:  New  Dislocation: no    Foreign body present:  No foreign bodies  Tetanus status:  Up to date  Prior injury to area:  No  Relieved by:  Nothing  Worsened by:  Bearing weight  Ineffective treatments:  None tried  Associated symptoms: swelling    Associated symptoms: no back pain, no fever, no numbness, no stiffness and no tingling        Review of Systems   Constitutional: Negative for fever.   Musculoskeletal: Negative for back pain and stiffness.   All other systems reviewed and are negative.      Past Medical History:   Diagnosis Date   • Diabetes mellitus    • Fibromyalgia, primary    • GERD (gastroesophageal reflux disease)    • Hyperlipidemia    • Hypertension    • Hypothyroidism        Allergies   Allergen Reactions   • Atorvastatin    • Simvastatin    • Sulfa Antibiotics    • Trulicity  [Dulaglutide]        Past Surgical History:   Procedure Laterality Date   • APPENDECTOMY     • BREAST EXCISIONAL BIOPSY Left     [place]   • CHOLECYSTECTOMY     • KIDNEY STONE SURGERY     • RHINOPLASTY         Family History   Problem Relation Age of Onset   • Diabetes Mother    • Cancer Father      melanoma   • Cancer Brother      melanoma   • Diabetes Brother    • Heart failure Brother    • Depression Brother    • Anxiety disorder Brother    • Bleeding Disorder Daughter    • Obesity Other    • Thyroid disease Other    • Pulmonary embolism Other    • Melanoma Other    • Arthritis Other    • Hyperlipidemia Other    • BRCA 1/2 Neg Hx    • Breast cancer Neg Hx    • Colon  "cancer Neg Hx    • Endometrial cancer Neg Hx    • Ovarian cancer Neg Hx        Social History     Social History   • Marital status:      Spouse name: N/A   • Number of children: N/A   • Years of education: N/A     Occupational History   • [employer]      Social History Main Topics   • Smoking status: Former Smoker     Types: Cigarettes   • Smokeless tobacco: None      Comment:  QUIT 1994   • Alcohol use No   • Drug use: No   • Sexual activity: Yes     Partners: Male     Other Topics Concern   • None     Social History Narrative   • None           Objective   Physical Exam   Constitutional: She appears well-developed and well-nourished.   HENT:   Head: Normocephalic.   Pulmonary/Chest: Effort normal.   Musculoskeletal:   RIGHT FOOT SWELLING AND REDNESS TO LATERAL DORSUM.  THIS AREA IS TENDER BUT NOT WARM. NO ANKLE TENDERNESS. NV STATUS NORMAL    Neurological: She is alert.   Skin: Skin is warm and dry.   Psychiatric: She has a normal mood and affect. Her behavior is normal. Judgment and thought content normal.   Nursing note and vitals reviewed.      Procedures         ED Course  ED Course                No results found for this or any previous visit (from the past 24 hour(s)).  Note: In addition to lab results from this visit, the labs listed above may include labs taken at another facility or during a different encounter within the last 24 hours. Please correlate lab times with ED admission and discharge times for further clarification of the services performed during this visit.    XR Foot 3+ View Right   Preliminary Result   Acute nondisplaced transverse fracture of the base of the   fifth metatarsal.       D:  04/19/2017   E:  04/19/2017            Vitals:    04/19/17 1141 04/19/17 1151   BP: 128/59    BP Location: Left arm    Patient Position: Sitting    Pulse: 98    Resp: 16    Temp: 97.7 °F (36.5 °C)    TempSrc: Oral    SpO2: 95%    Weight:  160 lb (72.6 kg)   Height:  62\" (157.5 cm) "     Medications   HYDROcodone-acetaminophen (NORCO) 5-325 MG per tablet 1 tablet (1 tablet Oral Given 4/19/17 1348)     ECG/EMG Results (last 24 hours)     ** No results found for the last 24 hours. **           [facility]    Final diagnoses:   Closed nondisplaced fracture of fifth metatarsal bone of right foot, initial encounter            SUSHILA Sanders  04/19/17 9960

## 2017-04-19 NOTE — DISCHARGE INSTRUCTIONS
USE CRUTCHES AND SPLINT UNTIL YOU SEE YOUR DOCTOR   Information regarding Risks and Benefits When using Opioids and Other Controlled Substances to include Storage and Disposal of Medications    When considering the use of opioids and other controlled substances for the control of pain, anxiety, or for other medical purposes, you need to know of not only the benefits of these drugs but also of potential risks in using these drugs. These drugs, as well as more drugs, have beneficial uses; which is why their use is being considered in your   care, but they have risks involved in their use, too.    Opioids:  Opioids such as hydrocodone, oxycodone, hydromorphone, and codeine are pain relieving drugs, some more potent than others. They are most useful for moderate to more severe painful conditions. Risks include sedation, loss of coordination, decreased concentration, and decreased breathing with possibility of loss of consciousness or even death, especially if used in doses higher than prescribed. Improper usage can lead to addiction, tolerance, or overdose. In addition, many of these drugs are combined with acetaminophen (Tylenol) which can damage or destroy our liver when used excessively.  Alternatives to opioids are useful for mild to moderate pain and include ibuprofen (Motrin), naproxen (Aleve), aspirin, and acetaminophen (Tylenol). As with other drugs, these medications should be used according to directions on the label or from your doctor, as overuse can cause harm.    Benzodiazepines:  This group of drugs include: alprazolam (Xanax), diazepam (Valium), lorazepam (Ativan), and clonazepam (Klonopin). These drugs are used to control anxiety symptoms including anxiety and panic attacks. Risks using these drugs include: sedation, loss of coordination, decreased ability to concentrate, effects on memory, and decreased breathing with possibility of loss of consciousness or even death. Improper and prolonged usage can  lead to addiction. An alternative without addiction potential is hydroxyzine (Vistrail).    Other Controlled Substance:  This group includes Soma, Tramadol, stimulant drugs such as Ritalin, and others. Stimulant drugs are not medications that are prescribed by ER doctors. Soma and Tramadol have sedative and addictive affects similar to opioids with the same dangers mentioned with them.    Overdose:  If you or someone else are concerned that overdose has occurred, call 911 for transportation to the nearest hospital.    Storage and Disposal:  All medications need to be kept out of the reach of children or adults who cannot manage their own medicines. In addition, controlled substances can be targeted by criminals so extra precautions need to be taken to keep them in a safe, secure place. Any unused medications should be disposed of by flushing them down the toilet in the home setting or contact your local pharmacy.

## 2017-05-18 RX ORDER — DAPAGLIFLOZIN AND METFORMIN HYDROCHLORIDE 5; 1000 MG/1; MG/1
TABLET, FILM COATED, EXTENDED RELEASE ORAL
Qty: 60 TABLET | Refills: 2 | OUTPATIENT
Start: 2017-05-18

## 2017-05-19 ENCOUNTER — TELEPHONE (OUTPATIENT)
Dept: FAMILY MEDICINE CLINIC | Facility: CLINIC | Age: 57
End: 2017-05-19

## 2017-05-25 ENCOUNTER — OFFICE VISIT (OUTPATIENT)
Dept: FAMILY MEDICINE CLINIC | Facility: CLINIC | Age: 57
End: 2017-05-25

## 2017-05-25 VITALS
WEIGHT: 161 LBS | RESPIRATION RATE: 16 BRPM | DIASTOLIC BLOOD PRESSURE: 64 MMHG | BODY MASS INDEX: 29.63 KG/M2 | OXYGEN SATURATION: 98 % | SYSTOLIC BLOOD PRESSURE: 130 MMHG | HEART RATE: 82 BPM | HEIGHT: 62 IN

## 2017-05-25 DIAGNOSIS — E11.9 CONTROLLED TYPE 2 DIABETES MELLITUS WITHOUT COMPLICATION, WITHOUT LONG-TERM CURRENT USE OF INSULIN (HCC): Primary | ICD-10-CM

## 2017-05-25 DIAGNOSIS — E03.8 OTHER SPECIFIED HYPOTHYROIDISM: ICD-10-CM

## 2017-05-25 DIAGNOSIS — K59.09 OTHER CONSTIPATION: ICD-10-CM

## 2017-05-25 DIAGNOSIS — E78.49 OTHER HYPERLIPIDEMIA: ICD-10-CM

## 2017-05-25 LAB
25(OH)D3 SERPL-MCNC: 31.4 NG/ML
ALBUMIN SERPL-MCNC: 4.8 G/DL (ref 3.2–4.8)
ALBUMIN/GLOB SERPL: 1.5 G/DL (ref 1.5–2.5)
ALP SERPL-CCNC: 72 U/L (ref 25–100)
ALT SERPL W P-5'-P-CCNC: 22 U/L (ref 7–40)
ANION GAP SERPL CALCULATED.3IONS-SCNC: 8 MMOL/L (ref 3–11)
ARTICHOKE IGE QN: 141 MG/DL (ref 0–130)
AST SERPL-CCNC: 18 U/L (ref 0–33)
BASOPHILS # BLD AUTO: 0.02 10*3/MM3 (ref 0–0.2)
BASOPHILS NFR BLD AUTO: 0.3 % (ref 0–1)
BILIRUB SERPL-MCNC: 0.5 MG/DL (ref 0.3–1.2)
BUN BLD-MCNC: 19 MG/DL (ref 9–23)
BUN/CREAT SERPL: 27.1 (ref 7–25)
CALCIUM SPEC-SCNC: 10.3 MG/DL (ref 8.7–10.4)
CHLORIDE SERPL-SCNC: 100 MMOL/L (ref 99–109)
CHOLEST SERPL-MCNC: 200 MG/DL (ref 0–200)
CO2 SERPL-SCNC: 30 MMOL/L (ref 20–31)
CREAT BLD-MCNC: 0.7 MG/DL (ref 0.6–1.3)
DEPRECATED RDW RBC AUTO: 48.5 FL (ref 37–54)
EOSINOPHIL # BLD AUTO: 0.16 10*3/MM3 (ref 0.1–0.3)
EOSINOPHIL NFR BLD AUTO: 2.6 % (ref 0–3)
ERYTHROCYTE [DISTWIDTH] IN BLOOD BY AUTOMATED COUNT: 15.2 % (ref 11.3–14.5)
GFR SERPL CREATININE-BSD FRML MDRD: 86 ML/MIN/1.73
GLOBULIN UR ELPH-MCNC: 3.1 GM/DL
GLUCOSE BLD-MCNC: 152 MG/DL (ref 70–100)
HBA1C MFR BLD: 9.3 %
HCT VFR BLD AUTO: 46.7 % (ref 34.5–44)
HDLC SERPL-MCNC: 37 MG/DL (ref 40–60)
HGB BLD-MCNC: 14.3 G/DL (ref 11.5–15.5)
IMM GRANULOCYTES # BLD: 0 10*3/MM3 (ref 0–0.03)
IMM GRANULOCYTES NFR BLD: 0 % (ref 0–0.6)
LYMPHOCYTES # BLD AUTO: 2.37 10*3/MM3 (ref 0.6–4.8)
LYMPHOCYTES NFR BLD AUTO: 37.8 % (ref 24–44)
MCH RBC QN AUTO: 26.6 PG (ref 27–31)
MCHC RBC AUTO-ENTMCNC: 30.6 G/DL (ref 32–36)
MCV RBC AUTO: 86.8 FL (ref 80–99)
MONOCYTES # BLD AUTO: 0.41 10*3/MM3 (ref 0–1)
MONOCYTES NFR BLD AUTO: 6.5 % (ref 0–12)
NEUTROPHILS # BLD AUTO: 3.31 10*3/MM3 (ref 1.5–8.3)
NEUTROPHILS NFR BLD AUTO: 52.8 % (ref 41–71)
PLATELET # BLD AUTO: 239 10*3/MM3 (ref 150–450)
PMV BLD AUTO: 10.6 FL (ref 6–12)
POTASSIUM BLD-SCNC: 4.5 MMOL/L (ref 3.5–5.5)
PROT SERPL-MCNC: 7.9 G/DL (ref 5.7–8.2)
RBC # BLD AUTO: 5.38 10*6/MM3 (ref 3.89–5.14)
SODIUM BLD-SCNC: 138 MMOL/L (ref 132–146)
TRIGL SERPL-MCNC: 289 MG/DL (ref 0–150)
TSH SERPL DL<=0.05 MIU/L-ACNC: 0.92 MIU/ML (ref 0.35–5.35)
WBC NRBC COR # BLD: 6.27 10*3/MM3 (ref 3.5–10.8)

## 2017-05-25 PROCEDURE — 84443 ASSAY THYROID STIM HORMONE: CPT | Performed by: FAMILY MEDICINE

## 2017-05-25 PROCEDURE — 36415 COLL VENOUS BLD VENIPUNCTURE: CPT | Performed by: FAMILY MEDICINE

## 2017-05-25 PROCEDURE — 83036 HEMOGLOBIN GLYCOSYLATED A1C: CPT | Performed by: FAMILY MEDICINE

## 2017-05-25 PROCEDURE — 80061 LIPID PANEL: CPT | Performed by: FAMILY MEDICINE

## 2017-05-25 PROCEDURE — 80053 COMPREHEN METABOLIC PANEL: CPT | Performed by: FAMILY MEDICINE

## 2017-05-25 PROCEDURE — 99214 OFFICE O/P EST MOD 30 MIN: CPT | Performed by: FAMILY MEDICINE

## 2017-05-25 PROCEDURE — 85025 COMPLETE CBC W/AUTO DIFF WBC: CPT | Performed by: FAMILY MEDICINE

## 2017-05-25 PROCEDURE — 82306 VITAMIN D 25 HYDROXY: CPT | Performed by: FAMILY MEDICINE

## 2017-05-25 RX ORDER — GLIMEPIRIDE 2 MG/1
2 TABLET ORAL
Qty: 30 TABLET | Refills: 3 | Status: SHIPPED | OUTPATIENT
Start: 2017-05-25 | End: 2017-07-25

## 2017-05-25 RX ORDER — VENLAFAXINE HYDROCHLORIDE 75 MG/1
CAPSULE, EXTENDED RELEASE ORAL
Refills: 0 | COMMUNITY
Start: 2017-05-18 | End: 2018-11-14

## 2017-05-25 RX ORDER — FENOFIBRATE 145 MG/1
145 TABLET, COATED ORAL DAILY
Qty: 30 TABLET | Refills: 3 | Status: SHIPPED | OUTPATIENT
Start: 2017-05-25 | End: 2017-07-25 | Stop reason: SDUPTHER

## 2017-05-25 RX ORDER — PRAVASTATIN SODIUM 40 MG
40 TABLET ORAL DAILY
Qty: 30 TABLET | Refills: 3 | Status: SHIPPED | OUTPATIENT
Start: 2017-05-25 | End: 2018-02-21 | Stop reason: SDUPTHER

## 2017-06-13 ENCOUNTER — TELEPHONE (OUTPATIENT)
Dept: FAMILY MEDICINE CLINIC | Facility: CLINIC | Age: 57
End: 2017-06-13

## 2017-06-13 RX ORDER — NITROFURANTOIN 25; 75 MG/1; MG/1
100 CAPSULE ORAL 2 TIMES DAILY
Qty: 20 CAPSULE | Refills: 0 | Status: SHIPPED | OUTPATIENT
Start: 2017-06-13 | End: 2017-07-25

## 2017-06-13 NOTE — TELEPHONE ENCOUNTER
----- Message from Rhonda Gunn DO sent at 6/13/2017  1:12 PM EDT -----  Regarding: FW: RX  Contact: [phone]  macrobid bid for 10 days no rf  ----- Message -----     From: Amaris Ulrich MA     Sent: 6/13/2017  11:59 AM       To: Rhonda Gunn DO  Subject: FW: RX                                               ----- Message -----     From: Chichi Frank     Sent: 6/13/2017  11:37 AM       To: Amaris Ulrich MA  Subject: RX                                               PATIENT HAVING CLOUDY URINE, DYSURIA, PAIN IN URETHRA, URINARY FREQUENCY, NO FEVER.  WANTS RX CALLED IN TO [facility].  SHE IS ALLERGIC TO SULFA.  ALSO, EKATERINA, HER BROTHER, NICOLAS GOLD, PASSED AWAY FROM HEART ATTACK ON 05-27-17.  DON'T HAVE TIME TO COME IN DUE TO PLANNING CELEBRATION OF LIFE FOR HER BROTHER.    THANKS

## 2017-06-20 ENCOUNTER — OFFICE VISIT (OUTPATIENT)
Dept: FAMILY MEDICINE CLINIC | Facility: CLINIC | Age: 57
End: 2017-06-20

## 2017-06-20 VITALS
RESPIRATION RATE: 16 BRPM | HEIGHT: 62 IN | DIASTOLIC BLOOD PRESSURE: 72 MMHG | SYSTOLIC BLOOD PRESSURE: 130 MMHG | OXYGEN SATURATION: 98 % | BODY MASS INDEX: 29.81 KG/M2 | WEIGHT: 162 LBS | TEMPERATURE: 98.1 F | HEART RATE: 84 BPM

## 2017-06-20 DIAGNOSIS — R35.89 POLYURIA: Primary | ICD-10-CM

## 2017-06-20 LAB
BILIRUB BLD-MCNC: NEGATIVE MG/DL
CLARITY, POC: CLEAR
COLOR UR: YELLOW
GLUCOSE UR STRIP-MCNC: ABNORMAL MG/DL
KETONES UR QL: ABNORMAL
LEUKOCYTE EST, POC: ABNORMAL
NITRITE UR-MCNC: NEGATIVE MG/ML
PH UR: 6 [PH] (ref 5–8)
PROT UR STRIP-MCNC: NEGATIVE MG/DL
RBC # UR STRIP: ABNORMAL /UL
SP GR UR: 1.01 (ref 1–1.03)
UROBILINOGEN UR QL: NORMAL

## 2017-06-20 PROCEDURE — 99213 OFFICE O/P EST LOW 20 MIN: CPT | Performed by: FAMILY MEDICINE

## 2017-06-20 PROCEDURE — 87086 URINE CULTURE/COLONY COUNT: CPT | Performed by: FAMILY MEDICINE

## 2017-06-20 PROCEDURE — 81003 URINALYSIS AUTO W/O SCOPE: CPT | Performed by: FAMILY MEDICINE

## 2017-06-20 RX ORDER — CIPROFLOXACIN 500 MG/1
500 TABLET, FILM COATED ORAL 2 TIMES DAILY
Qty: 10 TABLET | Refills: 0 | Status: SHIPPED | OUTPATIENT
Start: 2017-06-20 | End: 2017-07-25

## 2017-06-20 NOTE — PROGRESS NOTES
Subjective   Christi Chapa is a 56 y.o. female.     Urinary Tract Infection    This is a new (Recent treatment with Macrobid for UTI. Has had symptoms return.) problem. The current episode started 1 to 4 weeks ago. The problem occurs intermittently. The problem has been unchanged. The quality of the pain is described as burning. The pain is at a severity of 5/10. The pain is mild. There has been no fever. She is sexually active. There is no history of pyelonephritis. Associated symptoms include frequency, hesitancy and urgency. Pertinent negatives include no chills, flank pain, hematuria or nausea. Treatments tried: macrobid. The treatment provided mild relief. Her past medical history is significant for recurrent UTIs.        The following portions of the patient's history were reviewed and updated as appropriate: allergies, current medications, past family history, past medical history, past social history, past surgical history and problem list.    Review of Systems   Constitutional: Negative for appetite change, chills, diaphoresis, fatigue, fever and unexpected weight change.   HENT: Negative for congestion, ear pain, mouth sores, postnasal drip, rhinorrhea, sinus pressure, sneezing, sore throat and trouble swallowing.    Eyes: Negative for pain, redness and visual disturbance.   Respiratory: Negative for apnea, cough, chest tightness, shortness of breath and wheezing.    Cardiovascular: Negative for chest pain, palpitations and leg swelling.   Gastrointestinal: Negative for abdominal distention, blood in stool, constipation, diarrhea and nausea.   Endocrine: Negative for cold intolerance, polydipsia, polyphagia and polyuria.   Genitourinary: Positive for frequency, hesitancy and urgency. Negative for difficulty urinating, dysuria, enuresis, flank pain and hematuria.   Musculoskeletal: Negative for arthralgias, back pain, joint swelling, myalgias and neck pain.   Skin: Negative for color change, rash and  wound.   Neurological: Negative for dizziness, syncope, weakness, light-headedness and numbness.   Hematological: Negative for adenopathy.   Psychiatric/Behavioral: Negative for agitation, behavioral problems and confusion. The patient is not nervous/anxious.        Objective   Physical Exam   Constitutional: She is oriented to person, place, and time. She appears well-developed and well-nourished. No distress.   HENT:   Right Ear: External ear normal.   Left Ear: External ear normal.   Nose: Nose normal.   Mouth/Throat: Oropharynx is clear and moist.   Eyes: Conjunctivae and EOM are normal. Pupils are equal, round, and reactive to light.   Neck: Normal range of motion. Neck supple. No thyromegaly present.   Cardiovascular: Normal rate, regular rhythm and normal heart sounds.    No murmur heard.  Pulmonary/Chest: Effort normal and breath sounds normal. No respiratory distress. She has no wheezes.   Abdominal: Soft. Bowel sounds are normal. She exhibits no distension and no mass. There is no tenderness. There is no rebound and no guarding. No hernia.   Musculoskeletal: Normal range of motion. She exhibits no edema or tenderness.   Lymphadenopathy:     She has no cervical adenopathy.   Neurological: She is alert and oriented to person, place, and time. She has normal reflexes.   Skin: Skin is warm and dry. No rash noted. She is not diaphoretic. No erythema. No pallor.   Psychiatric: She has a normal mood and affect. Her behavior is normal. Judgment and thought content normal.   Nursing note and vitals reviewed.      Assessment/Plan   Christi was seen today for difficulty urinating and polyuria.    Diagnoses and all orders for this visit:    Polyuria  -     Cancel: POC Glycosylated Hemoglobin (Hb A1C)  -     POC Urinalysis Dipstick, Automated  -     Urine Culture    Other orders  -     ciprofloxacin (CIPRO) 500 MG tablet; Take 1 tablet by mouth 2 (Two) Times a Day.        I personally spent over half of a total 15  minutes face to face with the patient in counseling and discussion and/or coordination of care as described above.

## 2017-06-22 LAB — BACTERIA SPEC AEROBE CULT: NORMAL

## 2017-06-23 RX ORDER — FLUCONAZOLE 100 MG/1
TABLET ORAL
Qty: 3 TABLET | Refills: 0 | Status: SHIPPED | OUTPATIENT
Start: 2017-06-23 | End: 2017-07-25

## 2017-07-25 ENCOUNTER — OFFICE VISIT (OUTPATIENT)
Dept: FAMILY MEDICINE CLINIC | Facility: CLINIC | Age: 57
End: 2017-07-25

## 2017-07-25 VITALS
BODY MASS INDEX: 29.81 KG/M2 | HEART RATE: 82 BPM | SYSTOLIC BLOOD PRESSURE: 122 MMHG | WEIGHT: 162 LBS | HEIGHT: 62 IN | RESPIRATION RATE: 16 BRPM | OXYGEN SATURATION: 98 % | DIASTOLIC BLOOD PRESSURE: 78 MMHG

## 2017-07-25 DIAGNOSIS — E03.8 OTHER SPECIFIED HYPOTHYROIDISM: ICD-10-CM

## 2017-07-25 DIAGNOSIS — E11.9 CONTROLLED TYPE 2 DIABETES MELLITUS WITHOUT COMPLICATION, WITHOUT LONG-TERM CURRENT USE OF INSULIN (HCC): Primary | ICD-10-CM

## 2017-07-25 DIAGNOSIS — E78.49 OTHER HYPERLIPIDEMIA: ICD-10-CM

## 2017-07-25 DIAGNOSIS — J02.9 SORE THROAT: ICD-10-CM

## 2017-07-25 LAB
EXPIRATION DATE: NORMAL
HBA1C MFR BLD: 11.6 %
INTERNAL CONTROL: NORMAL
Lab: NORMAL
S PYO AG THROAT QL: NEGATIVE

## 2017-07-25 PROCEDURE — 99214 OFFICE O/P EST MOD 30 MIN: CPT | Performed by: FAMILY MEDICINE

## 2017-07-25 PROCEDURE — 87880 STREP A ASSAY W/OPTIC: CPT | Performed by: FAMILY MEDICINE

## 2017-07-25 PROCEDURE — 83036 HEMOGLOBIN GLYCOSYLATED A1C: CPT | Performed by: FAMILY MEDICINE

## 2017-07-25 RX ORDER — FENOFIBRATE 145 MG/1
145 TABLET, COATED ORAL DAILY
Qty: 30 TABLET | Refills: 3 | Status: SHIPPED | OUTPATIENT
Start: 2017-07-25 | End: 2018-01-22 | Stop reason: SDUPTHER

## 2017-07-25 RX ORDER — GLIMEPIRIDE 4 MG/1
4 TABLET ORAL
Qty: 30 TABLET | Refills: 3 | Status: SHIPPED | OUTPATIENT
Start: 2017-07-25 | End: 2017-10-03 | Stop reason: SDUPTHER

## 2017-07-25 NOTE — PROGRESS NOTES
Subjective   Christi Chapa is a 56 y.o. female.     Diabetes   She presents for her follow-up (has appointment  with [facility] in August) diabetic visit. She has type 2 diabetes mellitus. Her disease course has been fluctuating. There are no hypoglycemic associated symptoms. Pertinent negatives for hypoglycemia include no confusion, dizziness, headaches or nervousness/anxiousness. Associated symptoms include foot paresthesias. Pertinent negatives for diabetes include no chest pain, no fatigue, no foot ulcerations, no polydipsia, no polyphagia, no polyuria, no visual change and no weakness. There are no hypoglycemic complications. Symptoms are stable. Diabetic complications include peripheral neuropathy. Risk factors for coronary artery disease include diabetes mellitus, dyslipidemia, hypertension, family history, sedentary lifestyle and post-menopausal. Current diabetic treatment includes oral agent (dual therapy). She is compliant with treatment all of the time. Her weight is decreasing steadily. She is following a diabetic and generally unhealthy diet. When asked about meal planning, she reported none. She has had a previous visit with a dietitian. She participates in exercise intermittently. Her home blood glucose trend is decreasing steadily. An ACE inhibitor/angiotensin II receptor blocker is being taken. She sees a podiatrist.Eye exam is current.   Hyperlipidemia   This is a chronic problem. The current episode started more than 1 year ago. The problem is controlled. Recent lipid tests were reviewed and are low. Exacerbating diseases include hypothyroidism. There are no known factors aggravating her hyperlipidemia. Pertinent negatives include no chest pain, myalgias or shortness of breath. Current antihyperlipidemic treatment includes statins, diet change and fibric acid derivatives. The current treatment provides significant improvement of lipids. There are no compliance problems.  Risk factors for coronary  artery disease include diabetes mellitus, dyslipidemia, family history and hypertension.   Hypothyroidism   This is a chronic problem. The current episode started more than 1 year ago. The problem occurs constantly. The problem has been unchanged. Associated symptoms include coughing and a sore throat. Pertinent negatives include no arthralgias, chest pain, chills, congestion, diaphoresis, fatigue, fever, headaches, joint swelling, myalgias, nausea, neck pain, numbness, rash, visual change or weakness. Nothing aggravates the symptoms. Treatments tried: on Synthroid. The treatment provided significant relief.   URI    This is a new problem. The current episode started yesterday. The problem has been unchanged. There has been no fever. Associated symptoms include coughing and a sore throat. Pertinent negatives include no chest pain, congestion, diarrhea, dysuria, ear pain, headaches, nausea, neck pain, rash, rhinorrhea, sinus pain, sneezing or wheezing. She has tried NSAIDs for the symptoms. The treatment provided mild relief.      Has increased stress with brother passing away. 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 Has had increased weight and stress eating.    The following portions of the patient's history were reviewed and updated as appropriate: allergies, current medications, past family history, past medical history, past social history, past surgical history and problem list.    Review of Systems   Constitutional: Negative for appetite change, chills, diaphoresis, fatigue, fever and unexpected weight change.   HENT: Positive for sore throat. Negative for congestion, ear pain, mouth sores, postnasal drip, rhinorrhea, sinus pressure, sneezing and trouble swallowing.    Eyes: Negative for pain, redness and visual disturbance.   Respiratory: Positive for cough. Negative for apnea, chest tightness, shortness of breath and wheezing.    Cardiovascular: Negative for chest pain, palpitations and leg  swelling.   Gastrointestinal: Negative for abdominal distention, blood in stool, constipation, diarrhea and nausea.   Endocrine: Negative for cold intolerance, polydipsia, polyphagia and polyuria.   Genitourinary: Negative for difficulty urinating, dysuria, enuresis, flank pain and urgency.   Musculoskeletal: Negative for arthralgias, back pain, joint swelling, myalgias and neck pain.   Skin: Negative for color change, rash and wound.   Neurological: Negative for dizziness, syncope, weakness, light-headedness, numbness and headaches.   Hematological: Negative for adenopathy.   Psychiatric/Behavioral: Negative for agitation, behavioral problems and confusion. The patient is not nervous/anxious.        Objective   Physical Exam   Constitutional: She is oriented to person, place, and time. She appears well-developed and well-nourished. No distress.   HENT:   Right Ear: External ear normal.   Left Ear: External ear normal.   Nose: Nose normal.   Mouth/Throat: Oropharynx is clear and moist.   Eyes: Conjunctivae and EOM are normal. Pupils are equal, round, and reactive to light.   Neck: Normal range of motion. Neck supple. No thyromegaly present.   Cardiovascular: Normal rate, regular rhythm and normal heart sounds.    No murmur heard.  Pulmonary/Chest: Effort normal and breath sounds normal. No respiratory distress. She has no wheezes.   Abdominal: Soft. Bowel sounds are normal. She exhibits no distension and no mass. There is no tenderness. There is no rebound and no guarding. No hernia.   Musculoskeletal: Normal range of motion. She exhibits no edema or tenderness.   Lymphadenopathy:     She has no cervical adenopathy.   Neurological: She is alert and oriented to person, place, and time. She has normal reflexes.   Skin: Skin is warm and dry. No rash noted. She is not diaphoretic. No erythema. No pallor.   Psychiatric: She has a normal mood and affect. Her behavior is normal. Judgment and thought content normal.    Nursing note and vitals reviewed.      Assessment/Plan   Christi was seen today for diabetes, uri and sore throat.    Diagnoses and all orders for this visit:    Controlled type 2 diabetes mellitus without complication, without long-term current use of insulin  -     POC Glycosylated Hemoglobin (Hb A1C)    Sore throat  -     POC Rapid Strep A    Other orders  -     Dapagliflozin-Metformin HCl ER (XIGDUO XR) 5-1000 MG tablet sustained-release 24 hour; Take 5-1,000 mg by mouth Daily. 1 PO BID  LAST REFILL WITHOUT APPT  -     glimepiride (AMARYL) 4 MG tablet; Take 1 tablet by mouth Every Morning Before Breakfast.  -     fenofibrate (TRICOR) 145 MG tablet; Take 1 tablet by mouth Daily.  -     linaclotide (LINZESS) 145 MCG capsule capsule; Take 1 capsule by mouth Daily.      Has upcoming appointment with endocrinology. We will increase Amaryl to 4 mg daily. Patient will also continue Xigduo 05/1000 twice a day.  Patient continues with psychiatry regarding anxiety and depression.  I personally spent over half of a total 25 minutes face to face with the patient in counseling and discussion and/or coordination of care as described above.

## 2017-08-28 ENCOUNTER — TELEPHONE (OUTPATIENT)
Dept: FAMILY MEDICINE CLINIC | Facility: CLINIC | Age: 57
End: 2017-08-28

## 2017-08-28 NOTE — TELEPHONE ENCOUNTER
----- Message from Jasmyn More sent at 8/28/2017 11:45 AM EDT -----  Pt needs refill on Dapagliflozin- Metformin 5-1000 mg sent to [facility] [place]

## 2017-09-19 RX ORDER — GLIMEPIRIDE 2 MG/1
TABLET ORAL
Qty: 30 TABLET | Refills: 3 | OUTPATIENT
Start: 2017-09-19

## 2017-09-19 NOTE — TELEPHONE ENCOUNTER
Pt was supposed to see endo in August and needs to get diabetic meds from endo. They need to manage her diabetes

## 2017-09-22 RX ORDER — GLIMEPIRIDE 2 MG/1
TABLET ORAL
Qty: 30 TABLET | Refills: 3 | OUTPATIENT
Start: 2017-09-22

## 2017-09-29 RX ORDER — GLIMEPIRIDE 2 MG/1
TABLET ORAL
Qty: 30 TABLET | Refills: 3 | OUTPATIENT
Start: 2017-09-29

## 2017-10-03 ENCOUNTER — OFFICE VISIT (OUTPATIENT)
Dept: FAMILY MEDICINE CLINIC | Facility: CLINIC | Age: 57
End: 2017-10-03

## 2017-10-03 VITALS
RESPIRATION RATE: 16 BRPM | BODY MASS INDEX: 29.45 KG/M2 | TEMPERATURE: 97.8 F | HEART RATE: 82 BPM | DIASTOLIC BLOOD PRESSURE: 80 MMHG | SYSTOLIC BLOOD PRESSURE: 126 MMHG | WEIGHT: 161 LBS

## 2017-10-03 DIAGNOSIS — N39.0 URINARY TRACT INFECTION WITHOUT HEMATURIA, SITE UNSPECIFIED: Primary | ICD-10-CM

## 2017-10-03 LAB
BILIRUB BLD-MCNC: NEGATIVE MG/DL
CLARITY, POC: ABNORMAL
COLOR UR: YELLOW
GLUCOSE UR STRIP-MCNC: ABNORMAL MG/DL
KETONES UR QL: NEGATIVE
LEUKOCYTE EST, POC: ABNORMAL
NITRITE UR-MCNC: NEGATIVE MG/ML
PH UR: 5.5 [PH] (ref 5–8)
PROT UR STRIP-MCNC: ABNORMAL MG/DL
RBC # UR STRIP: ABNORMAL /UL
SP GR UR: 1.01 (ref 1–1.03)
UROBILINOGEN UR QL: NORMAL

## 2017-10-03 PROCEDURE — 81003 URINALYSIS AUTO W/O SCOPE: CPT | Performed by: FAMILY MEDICINE

## 2017-10-03 PROCEDURE — 99213 OFFICE O/P EST LOW 20 MIN: CPT | Performed by: FAMILY MEDICINE

## 2017-10-03 RX ORDER — PHENAZOPYRIDINE HYDROCHLORIDE 100 MG/1
100 TABLET, FILM COATED ORAL 3 TIMES DAILY PRN
Qty: 30 TABLET | Refills: 0 | Status: SHIPPED | OUTPATIENT
Start: 2017-10-03 | End: 2018-01-30

## 2017-10-03 RX ORDER — GLIMEPIRIDE 4 MG/1
4 TABLET ORAL
Qty: 30 TABLET | Refills: 1 | Status: SHIPPED | OUTPATIENT
Start: 2017-10-03 | End: 2018-04-05 | Stop reason: SDUPTHER

## 2017-10-03 RX ORDER — NITROFURANTOIN 25; 75 MG/1; MG/1
100 CAPSULE ORAL 2 TIMES DAILY
Qty: 14 CAPSULE | Refills: 0 | Status: SHIPPED | OUTPATIENT
Start: 2017-10-03 | End: 2018-01-30

## 2017-10-03 RX ORDER — FLUCONAZOLE 150 MG/1
150 TABLET ORAL ONCE
Qty: 1 TABLET | Refills: 0 | Status: SHIPPED | OUTPATIENT
Start: 2017-10-03 | End: 2017-10-03

## 2017-10-03 NOTE — PROGRESS NOTES
Subjective   Christi Chapa is a 56 y.o. female.     Urinary Tract Infection    This is a new problem. The current episode started in the past 7 days. The problem occurs every urination. The problem has been unchanged. The quality of the pain is described as aching. The pain is at a severity of 5/10. The pain is mild. There has been no fever. She is sexually active. There is no history of pyelonephritis. Associated symptoms include frequency and urgency. Pertinent negatives include no chills or discharge. Treatments tried: cranberry/azo. The treatment provided mild relief.     Has not been to  see endocrine. Is out of DM meds and needs rf of Amaryl.    The following portions of the patient's history were reviewed and updated as appropriate: allergies, current medications, past family history, past medical history, past social history, past surgical history and problem list.    Review of Systems   Constitutional: Negative.  Negative for chills.   HENT: Negative.    Eyes: Negative.    Respiratory: Negative.    Cardiovascular: Negative.    Gastrointestinal: Negative.    Endocrine: Negative.    Genitourinary: Positive for frequency and urgency.   Musculoskeletal: Negative.    Skin: Negative.    Allergic/Immunologic: Negative.    Neurological: Negative.    Hematological: Negative.    Psychiatric/Behavioral: Negative.    All other systems reviewed and are negative.      Objective   Physical Exam   Constitutional: She is oriented to person, place, and time. She appears well-developed and well-nourished. No distress.   HENT:   Right Ear: External ear normal.   Left Ear: External ear normal.   Nose: Nose normal.   Mouth/Throat: Oropharynx is clear and moist.   Eyes: Conjunctivae and EOM are normal. Pupils are equal, round, and reactive to light.   Neck: Normal range of motion. Neck supple. No thyromegaly present.   Cardiovascular: Normal rate, regular rhythm and normal heart sounds.    No murmur heard.  Pulmonary/Chest:  Effort normal and breath sounds normal. No respiratory distress. She has no wheezes.   Abdominal: Soft. Bowel sounds are normal. She exhibits no distension and no mass. There is no tenderness. There is no rebound and no guarding. No hernia.   Musculoskeletal: Normal range of motion. She exhibits no edema or tenderness.   Lymphadenopathy:     She has no cervical adenopathy.   Neurological: She is alert and oriented to person, place, and time. She has normal reflexes.   Skin: Skin is warm and dry. No rash noted. She is not diaphoretic. No erythema. No pallor.   Psychiatric: She has a normal mood and affect. Her behavior is normal. Judgment and thought content normal.   Nursing note and vitals reviewed.      Assessment/Plan   Christi was seen today for urinary tract infection.    Diagnoses and all orders for this visit:    Urinary tract infection without hematuria, site unspecified  -     POCT urinalysis dipstick, automated  -     Urine Culture - Urine, Urine, Clean Catch    Other orders  -     nitrofurantoin, macrocrystal-monohydrate, (MACROBID) 100 MG capsule; Take 1 capsule by mouth 2 (Two) Times a Day.  -     fluconazole (DIFLUCAN) 150 MG tablet; Take 1 tablet by mouth 1 (One) Time for 1 dose.  -     phenazopyridine (PYRIDIUM) 100 MG tablet; Take 1 tablet by mouth 3 (Three) Times a Day As Needed for bladder spasms.  -     glimepiride (AMARYL) 4 MG tablet; Take 1 tablet by mouth Every Morning Before Breakfast.        I personally spent over half of a total 25 minutes face to face with the patient in counseling and discussion and/or coordination of care as described above.

## 2017-10-04 ENCOUNTER — TELEPHONE (OUTPATIENT)
Dept: FAMILY MEDICINE CLINIC | Facility: CLINIC | Age: 57
End: 2017-10-04

## 2017-10-04 NOTE — TELEPHONE ENCOUNTER
Patient states that she is now having burning with urination and their is blood.  Per Dr Gunn, patient is to go to [facility] or ER to be further evaluated. Patient notified and voiced understanding.     ----- Message from Magdalena Larson MA sent at 10/4/2017  2:07 PM EDT -----  Regarding: FW: UTI GETTING WORSE AFTER TAKING ANTIBIOTICS  Contact: [phone]      ----- Message -----     From: Magdalena Larson MA     Sent: 10/4/2017   1:50 PM       To:   Subject: RE: UTI GETTING WORSE AFTER TAKING ANTIBIOTI#    Joyce said the urine was dumped before the culture could be drawn up. She tried to call her to come back, but was unable to get ahold of her. Do you want me to call her and have her come in today if possible?    ----- Message -----     From: Rhonda Gunn DO     Sent: 10/4/2017   1:40 PM       To: Magdalena Larson MA  Subject: FW: UTI GETTING WORSE AFTER TAKING ANTIBIOTI#    She can go ahead and take the Diflucan pill. She needs to finish Macrobid. Is her urine culture pending?  ----- Message -----     From: Magdalena Larson MA     Sent: 10/4/2017  12:21 PM       To: Rhonda Gunn DO  Subject: FW: UTI GETTING WORSE AFTER TAKING ANTIBIOTI#        ----- Message -----     From: Violet Red     Sent: 10/4/2017  11:59 AM       To: Magdalena Larson MA  Subject: UTI GETTING WORSE AFTER TAKING ANTIBIOTICS       PLEASE CALL IN A STRONGER ANTIBIOTIC TO [facility] IN [place]. PATIENT IS GETTING WORSE. LEAVING TOWN IN MORNING. PLEASE CALL PT AND ADVISE.

## 2017-10-04 NOTE — TELEPHONE ENCOUNTER
----- Message from Magdalena Larson MA sent at 10/4/2017  2:07 PM EDT -----  Regarding: FW: UTI GETTING WORSE AFTER TAKING ANTIBIOTICS  Contact: [phone]      ----- Message -----     From: Magdalena Larson MA     Sent: 10/4/2017   1:50 PM       To:   Subject: RE: UTI GETTING WORSE AFTER TAKING ANTIBIOTI#    Joyce said the urine was dumped before the culture could be drawn up. She tried to call her to come back, but was unable to get ahold of her. Do you want me to call her and have her come in today if possible?    ----- Message -----     From: Rhonda Gunn DO     Sent: 10/4/2017   1:40 PM       To: Magdalena Larson MA  Subject: FW: UTI GETTING WORSE AFTER TAKING ANTIBIOTI#    She can go ahead and take the Diflucan pill. She needs to finish Macrobid. Is her urine culture pending?  ----- Message -----     From: Magdalena Larson MA     Sent: 10/4/2017  12:21 PM       To: Rhonda Gunn DO  Subject: FW: UTI GETTING WORSE AFTER TAKING ANTIBIOTI#        ----- Message -----     From: Violet Red     Sent: 10/4/2017  11:59 AM       To: Magdalena Larson MA  Subject: UTI GETTING WORSE AFTER TAKING ANTIBIOTICS       PLEASE CALL IN A STRONGER ANTIBIOTIC TO [facility] IN [place]. PATIENT IS GETTING WORSE. LEAVING TOWN IN MORNING. PLEASE CALL PT AND ADVISE.

## 2017-10-20 RX ORDER — LEVOTHYROXINE SODIUM 0.03 MG/1
TABLET ORAL
Qty: 30 TABLET | Refills: 5 | Status: SHIPPED | OUTPATIENT
Start: 2017-10-20 | End: 2018-04-05 | Stop reason: SDUPTHER

## 2018-01-23 RX ORDER — FENOFIBRATE 145 MG/1
145 TABLET, COATED ORAL DAILY
Qty: 30 TABLET | Refills: 3 | Status: SHIPPED | OUTPATIENT
Start: 2018-01-23 | End: 2018-06-14 | Stop reason: SDUPTHER

## 2018-01-30 ENCOUNTER — OFFICE VISIT (OUTPATIENT)
Dept: FAMILY MEDICINE CLINIC | Facility: CLINIC | Age: 58
End: 2018-01-30

## 2018-01-30 VITALS
SYSTOLIC BLOOD PRESSURE: 124 MMHG | DIASTOLIC BLOOD PRESSURE: 86 MMHG | RESPIRATION RATE: 16 BRPM | HEART RATE: 82 BPM | BODY MASS INDEX: 31.83 KG/M2 | OXYGEN SATURATION: 98 % | HEIGHT: 62 IN | WEIGHT: 173 LBS

## 2018-01-30 DIAGNOSIS — S39.012S STRAIN OF LUMBAR REGION, SEQUELA: ICD-10-CM

## 2018-01-30 DIAGNOSIS — B37.9 CANDIDIASIS: ICD-10-CM

## 2018-01-30 DIAGNOSIS — L60.8 TOENAIL DEFORMITY: ICD-10-CM

## 2018-01-30 DIAGNOSIS — IMO0001 UNCONTROLLED TYPE 2 DIABETES MELLITUS WITHOUT COMPLICATION, WITHOUT LONG-TERM CURRENT USE OF INSULIN: Primary | ICD-10-CM

## 2018-01-30 LAB
EXPIRATION DATE: ABNORMAL
HBA1C MFR BLD: 11.4 %
Lab: ABNORMAL

## 2018-01-30 PROCEDURE — 99214 OFFICE O/P EST MOD 30 MIN: CPT | Performed by: FAMILY MEDICINE

## 2018-01-30 PROCEDURE — 83036 HEMOGLOBIN GLYCOSYLATED A1C: CPT | Performed by: FAMILY MEDICINE

## 2018-01-30 RX ORDER — FLUCONAZOLE 150 MG/1
150 TABLET ORAL ONCE
Qty: 1 TABLET | Refills: 1 | Status: SHIPPED | OUTPATIENT
Start: 2018-01-30 | End: 2018-01-30

## 2018-01-30 NOTE — PROGRESS NOTES
Subjective   Christi Chapa is a 57 y.o. female.     Vaginitis   This is a recurrent problem. The current episode started 1 to 4 weeks ago. The problem occurs intermittently. Pertinent negatives include no arthralgias, chills, diaphoresis, fatigue, fever, joint swelling, myalgias, numbness, visual change or weakness. Exacerbated by:  elevated Blood sugar. Treatments tried: Diflucan. The treatment provided mild relief.   Diabetes   She presents for her follow-up (Has not had f/u with endo since 8/17. Has not been compliant with diet.) diabetic visit. She has type 2 diabetes mellitus. Her disease course has been fluctuating. There are no hypoglycemic associated symptoms. Pertinent negatives for hypoglycemia include no confusion, dizziness or nervousness/anxiousness. Associated symptoms include foot paresthesias. Pertinent negatives for diabetes include no fatigue, no foot ulcerations, no polydipsia, no polyphagia, no polyuria, no visual change and no weakness. There are no hypoglycemic complications. Symptoms are stable. Diabetic complications include peripheral neuropathy. Risk factors for coronary artery disease include diabetes mellitus, dyslipidemia, hypertension, family history, sedentary lifestyle and post-menopausal. Current diabetic treatment includes oral agent (triple therapy). She is compliant with treatment most of the time. Her weight is decreasing steadily. She is following a diabetic and generally unhealthy diet. When asked about meal planning, she reported none. She has had a previous visit with a dietitian. She participates in exercise intermittently. Her home blood glucose trend is decreasing steadily. An ACE inhibitor/angiotensin II receptor blocker is being taken. She sees a podiatrist.Eye exam is current.   Hyperlipidemia   This is a chronic problem. The current episode started more than 1 year ago. The problem is controlled. Recent lipid tests were reviewed and are low. Exacerbating diseases  "include hypothyroidism. There are no known factors aggravating her hyperlipidemia. Pertinent negatives include no myalgias or shortness of breath. Current antihyperlipidemic treatment includes statins, diet change and fibric acid derivatives. The current treatment provides significant improvement of lipids. There are no compliance problems.  Risk factors for coronary artery disease include diabetes mellitus, dyslipidemia, family history and hypertension.   Hypothyroidism   This is a chronic problem. The current episode started more than 1 year ago. The problem occurs constantly. The problem has been unchanged. Pertinent negatives include no arthralgias, chills, diaphoresis, fatigue, fever, joint swelling, myalgias, numbness, visual change or weakness. Nothing aggravates the symptoms. Treatments tried: on Synthroid. The treatment provided significant relief.   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    Review of Systems   Constitutional: Negative for chills, diaphoresis, fatigue and fever.   Respiratory: Negative for shortness of breath.    Endocrine: Negative for polydipsia, polyphagia and polyuria.   Musculoskeletal: Negative for arthralgias, joint swelling and myalgias.   Neurological: Negative for dizziness, weakness and numbness.   Psychiatric/Behavioral: Negative for confusion. The patient is not nervous/anxious.        Objective   Physical Exam   Constitutional: She is oriented to person, place, and time. She appears well-developed and well-nourished. No distress.   HENT:   Right Ear: External ear normal.   Left Ear: External ear normal.   Nose: Nose normal.   Mouth/Throat: Oropharynx is clear and moist.   Eyes: Conjunctivae and EOM are normal. " Pupils are equal, round, and reactive to light.   Neck: Normal range of motion. Neck supple. No thyromegaly present.   Cardiovascular: Normal rate, regular rhythm and normal heart sounds.    No murmur heard.  Pulmonary/Chest: Effort normal and breath sounds normal. No respiratory distress. She has no wheezes.   Abdominal: Soft. Bowel sounds are normal. She exhibits no distension and no mass. There is no tenderness. There is no rebound and no guarding. No hernia.   Musculoskeletal: Normal range of motion. She exhibits no edema or tenderness.   Lymphadenopathy:     She has no cervical adenopathy.   Neurological: She is alert and oriented to person, place, and time. She has normal reflexes.   Skin: Skin is warm and dry. No rash noted. She is not diaphoretic. No erythema. No pallor.   Psychiatric: She has a normal mood and affect. Her behavior is normal. Judgment and thought content normal.   Nursing note and vitals reviewed.      Assessment/Plan   Christi was seen today for vaginitis and diabetes.    Diagnoses and all orders for this visit:    Uncontrolled type 2 diabetes mellitus without complication, without long-term current use of insulin  -     POC Glycosylated Hemoglobin (Hb A1C)  -     Ambulatory Referral to Endocrinology  -     Ambulatory Referral to Podiatry    Strain of lumbar region, sequela  -     MRI Lumbar Spine Without Contrast; Future    Candidiasis    Toenail deformity  -     Ambulatory Referral to Podiatry    Other orders  -     fluconazole (DIFLUCAN) 150 MG tablet; Take 1 tablet by mouth 1 (One) Time for 1 dose.      Rec. Improving diet and decreasing sugars. F/U with endo.  I personally spent over half of a total 40 minutes face to face with the patient in counseling and discussion and/or coordination of care as described above.

## 2018-01-31 DIAGNOSIS — S39.012S STRAIN OF LUMBAR REGION, SEQUELA: Primary | ICD-10-CM

## 2018-02-15 ENCOUNTER — HOSPITAL ENCOUNTER (OUTPATIENT)
Dept: GENERAL RADIOLOGY | Facility: HOSPITAL | Age: 58
Discharge: HOME | End: 2018-02-15
Attending: FAMILY MEDICINE | Admitting: FAMILY MEDICINE

## 2018-02-15 ENCOUNTER — OFFICE VISIT (OUTPATIENT)
Dept: FAMILY MEDICINE CLINIC | Facility: CLINIC | Age: 58
End: 2018-02-15

## 2018-02-15 VITALS
HEART RATE: 80 BPM | HEIGHT: 62 IN | SYSTOLIC BLOOD PRESSURE: 126 MMHG | WEIGHT: 167 LBS | DIASTOLIC BLOOD PRESSURE: 82 MMHG | BODY MASS INDEX: 30.73 KG/M2 | RESPIRATION RATE: 16 BRPM | OXYGEN SATURATION: 98 %

## 2018-02-15 DIAGNOSIS — R68.84 JAW PAIN: Primary | ICD-10-CM

## 2018-02-15 DIAGNOSIS — R68.84 JAW PAIN: ICD-10-CM

## 2018-02-15 PROCEDURE — 70110 X-RAY EXAM OF JAW 4/> VIEWS: CPT

## 2018-02-15 PROCEDURE — 99213 OFFICE O/P EST LOW 20 MIN: CPT | Performed by: FAMILY MEDICINE

## 2018-02-15 RX ORDER — IBUPROFEN 800 MG/1
800 TABLET ORAL EVERY 8 HOURS PRN
Qty: 45 TABLET | Refills: 0 | Status: SHIPPED | OUTPATIENT
Start: 2018-02-15 | End: 2018-09-05

## 2018-02-15 NOTE — PROGRESS NOTES
Subjective   Christi Chapa is a 57 y.o. female.     History of Present Illness   Fell in middle of night getting out of bed and left jaw hit dg stand. Did not take med.  The following portions of the patient's history were reviewed and updated as appropriate: allergies, current medications, past family history, past medical history, past social history, past surgical history and problem list.    Review of Systems   Constitutional: Negative.    HENT: Negative.    Eyes: Negative.    Respiratory: Negative.    Cardiovascular: Negative.    Gastrointestinal: Negative.    Endocrine: Negative.    Genitourinary: Negative.    Musculoskeletal: Negative.    Skin: Negative.    Allergic/Immunologic: Negative.    Neurological: Negative.    Hematological: Negative.    Psychiatric/Behavioral: Negative.    All other systems reviewed and are negative.      Objective   Physical Exam   Constitutional: She is oriented to person, place, and time. She appears well-developed and well-nourished. No distress.   HENT:   Right Ear: External ear normal.   Left Ear: External ear normal.   Nose: Nose normal.   Mouth/Throat: Oropharynx is clear and moist.   Left mandible swollen and tender to touch. Has some difficulty with open and closing jaw   Eyes: Conjunctivae and EOM are normal. Pupils are equal, round, and reactive to light.   Neck: Normal range of motion. Neck supple. No thyromegaly present.   Cardiovascular: Normal rate, regular rhythm and normal heart sounds.    No murmur heard.  Pulmonary/Chest: Effort normal and breath sounds normal. No respiratory distress. She has no wheezes.   Abdominal: Soft. Bowel sounds are normal. She exhibits no distension and no mass. There is no tenderness. There is no rebound and no guarding. No hernia.   Musculoskeletal: Normal range of motion. She exhibits no edema or tenderness.   Lymphadenopathy:     She has no cervical adenopathy.   Neurological: She is alert and oriented to person, place, and time.  She has normal reflexes.   Skin: Skin is warm and dry. No rash noted. She is not diaphoretic. No erythema. No pallor.   Psychiatric: She has a normal mood and affect. Her behavior is normal. Judgment and thought content normal.   Nursing note and vitals reviewed.      Assessment/Plan   Christi was seen today for fall.    Diagnoses and all orders for this visit:    Jaw pain  -     XR mandible 4+ vw; Future    Other orders  -     ibuprofen (ADVIL,MOTRIN) 800 MG tablet; Take 1 tablet by mouth Every 8 (Eight) Hours As Needed for Mild Pain .        I personally spent over half of a total 15 minutes face to face with the patient in counseling and discussion and/or coordination of care as described above.

## 2018-02-22 RX ORDER — PRAVASTATIN SODIUM 40 MG
TABLET ORAL
Qty: 30 TABLET | Refills: 3 | Status: SHIPPED | OUTPATIENT
Start: 2018-02-22 | End: 2018-06-14 | Stop reason: SDUPTHER

## 2018-03-09 ENCOUNTER — OFFICE VISIT (OUTPATIENT)
Dept: FAMILY MEDICINE CLINIC | Facility: CLINIC | Age: 58
End: 2018-03-09

## 2018-03-09 VITALS
WEIGHT: 160 LBS | SYSTOLIC BLOOD PRESSURE: 120 MMHG | HEART RATE: 90 BPM | RESPIRATION RATE: 16 BRPM | HEIGHT: 62 IN | TEMPERATURE: 97.4 F | BODY MASS INDEX: 29.44 KG/M2 | DIASTOLIC BLOOD PRESSURE: 80 MMHG | OXYGEN SATURATION: 98 %

## 2018-03-09 DIAGNOSIS — IMO0001 UNCONTROLLED TYPE 2 DIABETES MELLITUS WITHOUT COMPLICATION, WITHOUT LONG-TERM CURRENT USE OF INSULIN: ICD-10-CM

## 2018-03-09 DIAGNOSIS — M79.10 MYALGIA: ICD-10-CM

## 2018-03-09 DIAGNOSIS — R31.0 GROSS HEMATURIA: Primary | ICD-10-CM

## 2018-03-09 LAB
ALBUMIN SERPL-MCNC: 4.6 G/DL (ref 3.2–4.8)
ALBUMIN/GLOB SERPL: 1.5 G/DL (ref 1.5–2.5)
ALP SERPL-CCNC: 60 U/L (ref 25–100)
ALT SERPL W P-5'-P-CCNC: 38 U/L (ref 7–40)
ANION GAP SERPL CALCULATED.3IONS-SCNC: 12 MMOL/L (ref 3–11)
AST SERPL-CCNC: 25 U/L (ref 0–33)
BASOPHILS # BLD AUTO: 0.04 10*3/MM3 (ref 0–0.2)
BASOPHILS NFR BLD AUTO: 0.5 % (ref 0–1)
BILIRUB BLD-MCNC: NEGATIVE MG/DL
BILIRUB SERPL-MCNC: 0.3 MG/DL (ref 0.3–1.2)
BUN BLD-MCNC: 15 MG/DL (ref 9–23)
BUN/CREAT SERPL: 21.4 (ref 7–25)
CALCIUM SPEC-SCNC: 10 MG/DL (ref 8.7–10.4)
CHLORIDE SERPL-SCNC: 105 MMOL/L (ref 99–109)
CK SERPL-CCNC: 48 U/L (ref 26–174)
CLARITY, POC: CLEAR
CO2 SERPL-SCNC: 22 MMOL/L (ref 20–31)
COLOR UR: ABNORMAL
CREAT BLD-MCNC: 0.7 MG/DL (ref 0.6–1.3)
DEPRECATED RDW RBC AUTO: 45.1 FL (ref 37–54)
EOSINOPHIL # BLD AUTO: 0.5 10*3/MM3 (ref 0–0.3)
EOSINOPHIL NFR BLD AUTO: 5.9 % (ref 0–3)
ERYTHROCYTE [DISTWIDTH] IN BLOOD BY AUTOMATED COUNT: 14.7 % (ref 11.3–14.5)
EXPIRATION DATE: ABNORMAL
GFR SERPL CREATININE-BSD FRML MDRD: 86 ML/MIN/1.73
GLOBULIN UR ELPH-MCNC: 3.1 GM/DL
GLUCOSE BLD-MCNC: 164 MG/DL (ref 70–100)
GLUCOSE UR STRIP-MCNC: ABNORMAL MG/DL
HAV IGM SERPL QL IA: NORMAL
HBA1C MFR BLD: 10.5 %
HBV CORE IGM SERPL QL IA: NORMAL
HBV SURFACE AG SERPL QL IA: NORMAL
HCT VFR BLD AUTO: 44.9 % (ref 34.5–44)
HCV AB SER DONR QL: NORMAL
HGB BLD-MCNC: 13.9 G/DL (ref 11.5–15.5)
IMM GRANULOCYTES # BLD: 0.03 10*3/MM3 (ref 0–0.03)
IMM GRANULOCYTES NFR BLD: 0.4 % (ref 0–0.6)
KETONES UR QL: ABNORMAL
LEUKOCYTE EST, POC: ABNORMAL
LYMPHOCYTES # BLD AUTO: 2.76 10*3/MM3 (ref 0.6–4.8)
LYMPHOCYTES NFR BLD AUTO: 32.8 % (ref 24–44)
Lab: ABNORMAL
MCH RBC QN AUTO: 26.3 PG (ref 27–31)
MCHC RBC AUTO-ENTMCNC: 31 G/DL (ref 32–36)
MCV RBC AUTO: 84.9 FL (ref 80–99)
MONOCYTES # BLD AUTO: 0.57 10*3/MM3 (ref 0–1)
MONOCYTES NFR BLD AUTO: 6.8 % (ref 0–12)
NEUTROPHILS # BLD AUTO: 4.51 10*3/MM3 (ref 1.5–8.3)
NEUTROPHILS NFR BLD AUTO: 53.6 % (ref 41–71)
NITRITE UR-MCNC: NEGATIVE MG/ML
PH UR: 6 [PH] (ref 5–8)
PLATELET # BLD AUTO: 250 10*3/MM3 (ref 150–450)
PMV BLD AUTO: 11.3 FL (ref 6–12)
POTASSIUM BLD-SCNC: 4.2 MMOL/L (ref 3.5–5.5)
PROT SERPL-MCNC: 7.7 G/DL (ref 5.7–8.2)
PROT UR STRIP-MCNC: ABNORMAL MG/DL
RBC # BLD AUTO: 5.29 10*6/MM3 (ref 3.89–5.14)
RBC # UR STRIP: ABNORMAL /UL
SODIUM BLD-SCNC: 139 MMOL/L (ref 132–146)
SP GR UR: 1.01 (ref 1–1.03)
UROBILINOGEN UR QL: NORMAL
WBC NRBC COR # BLD: 8.41 10*3/MM3 (ref 3.5–10.8)

## 2018-03-09 PROCEDURE — 80074 ACUTE HEPATITIS PANEL: CPT | Performed by: NURSE PRACTITIONER

## 2018-03-09 PROCEDURE — 36415 COLL VENOUS BLD VENIPUNCTURE: CPT | Performed by: NURSE PRACTITIONER

## 2018-03-09 PROCEDURE — 99213 OFFICE O/P EST LOW 20 MIN: CPT | Performed by: NURSE PRACTITIONER

## 2018-03-09 PROCEDURE — 85025 COMPLETE CBC W/AUTO DIFF WBC: CPT | Performed by: NURSE PRACTITIONER

## 2018-03-09 PROCEDURE — 81003 URINALYSIS AUTO W/O SCOPE: CPT | Performed by: NURSE PRACTITIONER

## 2018-03-09 PROCEDURE — 87147 CULTURE TYPE IMMUNOLOGIC: CPT | Performed by: NURSE PRACTITIONER

## 2018-03-09 PROCEDURE — 80053 COMPREHEN METABOLIC PANEL: CPT | Performed by: NURSE PRACTITIONER

## 2018-03-09 PROCEDURE — 82550 ASSAY OF CK (CPK): CPT | Performed by: NURSE PRACTITIONER

## 2018-03-09 PROCEDURE — 83036 HEMOGLOBIN GLYCOSYLATED A1C: CPT | Performed by: NURSE PRACTITIONER

## 2018-03-09 PROCEDURE — 87086 URINE CULTURE/COLONY COUNT: CPT | Performed by: NURSE PRACTITIONER

## 2018-03-09 RX ORDER — MIRTAZAPINE 30 MG/1
30 TABLET, FILM COATED ORAL NIGHTLY
COMMUNITY
End: 2018-09-05

## 2018-03-09 RX ORDER — FLUCONAZOLE 150 MG/1
TABLET ORAL
Refills: 1 | COMMUNITY
Start: 2018-02-15 | End: 2018-04-16

## 2018-03-09 NOTE — PROGRESS NOTES
Subjective   Christi Chapa is a 57 y.o. female.     History of Present Illness Ms Chapa presents complaining of brown urine. She is drinking some type of colon cleanse for the last 2 days. Denies urinary frequency, dysuria and hesitancy. Has a history of kidney stones. Passes one a year. Slight flank pain on right with slight nausea. Symptoms are not typical of her usual renal stone pain.  She sees a chiropractor who encouraged her to have labs drawn for rhabdomyolysis. She has a traumatic event one year ago. Last week she started working in a  and lifting the kids has been painful.    The following portions of the patient's history were reviewed and updated as appropriate: allergies, current medications, past family history, past medical history, past social history, past surgical history and problem list.    Review of Systems   Constitutional: Negative for fatigue, fever and unexpected weight change.   HENT: Negative for congestion, hearing loss, nosebleeds, rhinorrhea, sore throat, trouble swallowing and voice change.    Eyes: Negative for pain, discharge, redness and visual disturbance.   Respiratory: Negative for cough, chest tightness, shortness of breath and wheezing.    Cardiovascular: Negative for chest pain, palpitations and leg swelling.   Gastrointestinal: Negative for abdominal distention, abdominal pain, anal bleeding, blood in stool, constipation, diarrhea, nausea and vomiting.   Endocrine: Negative for cold intolerance, heat intolerance, polydipsia, polyphagia and polyuria.   Genitourinary: Negative for dysuria, flank pain, frequency and hematuria.   Musculoskeletal: Negative for arthralgias, gait problem, joint swelling and myalgias.   Skin: Negative for color change and rash.   Neurological: Negative for dizziness, tremors, seizures, syncope, speech difficulty, weakness, numbness and headaches.   Hematological: Negative.    Psychiatric/Behavioral: Negative.        Objective   Physical Exam    Constitutional: She is oriented to person, place, and time. She appears well-developed and well-nourished. No distress.   HENT:   Head: Normocephalic and atraumatic.   Right Ear: External ear normal.   Left Ear: External ear normal.   Nose: Nose normal.   Eyes: Conjunctivae are normal. Pupils are equal, round, and reactive to light. Right eye exhibits no discharge. Left eye exhibits no discharge. No scleral icterus.   Neck: Neck supple.   Cardiovascular: Normal rate and regular rhythm.    Pulmonary/Chest: Effort normal.   Musculoskeletal: She exhibits no edema or deformity.   Neurological: She is alert and oriented to person, place, and time. She exhibits normal muscle tone. Coordination normal.   Skin: Skin is warm and dry. No rash noted.   Psychiatric: She has a normal mood and affect. Her behavior is normal. Judgment and thought content normal.   Nursing note and vitals reviewed.      Assessment/Plan   Christi was seen today for blood in urine and fatigue.    Diagnoses and all orders for this visit:    Gross hematuria  -     CBC & Differential  -     Comprehensive Metabolic Panel  -     Hepatitis Panel, Acute  -     Urine Culture - Urine, Urine, Clean Catch    Myalgia  -     CK    Uncontrolled type 2 diabetes mellitus without complication, without long-term current use of insulin  -     POC Glycosylated Hemoglobin (Hb A1C)    Increase water. Go to ER symptoms of renal stone progress or worsen.  Discussed the nature of the disease including, risks, complications, implications, management, safe and proper use of medications. Encouraged therapeutic lifestyle changes including low calorie diet with plenty of fruits and vegetables, daily exercise, medication compliance, and keeping scheduled follow up appointments with me and any other providers. Encouraged patient to have appointment for complete physical, fasting labs, appropriate screenings, and immunizations on an annual basis.  Follow up with PCP.

## 2018-03-11 LAB
BACTERIA SPEC AEROBE CULT: ABNORMAL
STREP GROUPING: ABNORMAL

## 2018-03-15 RX ORDER — DAPAGLIFLOZIN AND METFORMIN HYDROCHLORIDE 5; 1000 MG/1; MG/1
TABLET, FILM COATED, EXTENDED RELEASE ORAL
Qty: 60 TABLET | Refills: 2 | OUTPATIENT
Start: 2018-03-15

## 2018-03-18 RX ORDER — DAPAGLIFLOZIN AND METFORMIN HYDROCHLORIDE 5; 1000 MG/1; MG/1
TABLET, FILM COATED, EXTENDED RELEASE ORAL
Qty: 60 TABLET | Refills: 2 | Status: SHIPPED | OUTPATIENT
Start: 2018-03-18 | End: 2018-05-24 | Stop reason: SDUPTHER

## 2018-04-06 RX ORDER — LEVOTHYROXINE SODIUM 0.03 MG/1
25 TABLET ORAL DAILY
Qty: 30 TABLET | Refills: 1 | Status: SHIPPED | OUTPATIENT
Start: 2018-04-06 | End: 2018-06-14 | Stop reason: SDUPTHER

## 2018-04-06 RX ORDER — GLIMEPIRIDE 4 MG/1
4 TABLET ORAL
Qty: 30 TABLET | Refills: 1 | Status: SHIPPED | OUTPATIENT
Start: 2018-04-06 | End: 2018-04-16 | Stop reason: SDUPTHER

## 2018-04-06 RX ORDER — GLIMEPIRIDE 4 MG/1
TABLET ORAL
Qty: 30 TABLET | Refills: 3 | OUTPATIENT
Start: 2018-04-06

## 2018-04-16 ENCOUNTER — OFFICE VISIT (OUTPATIENT)
Dept: FAMILY MEDICINE CLINIC | Facility: CLINIC | Age: 58
End: 2018-04-16

## 2018-04-16 VITALS
RESPIRATION RATE: 16 BRPM | HEART RATE: 86 BPM | BODY MASS INDEX: 30.55 KG/M2 | WEIGHT: 166 LBS | SYSTOLIC BLOOD PRESSURE: 124 MMHG | OXYGEN SATURATION: 98 % | DIASTOLIC BLOOD PRESSURE: 78 MMHG | HEIGHT: 62 IN

## 2018-04-16 DIAGNOSIS — I10 ESSENTIAL HYPERTENSION: ICD-10-CM

## 2018-04-16 DIAGNOSIS — R19.00 ABDOMINAL SWELLING: ICD-10-CM

## 2018-04-16 DIAGNOSIS — E11.9 TYPE 2 DIABETES MELLITUS WITHOUT COMPLICATION, WITHOUT LONG-TERM CURRENT USE OF INSULIN (HCC): Primary | ICD-10-CM

## 2018-04-16 DIAGNOSIS — E03.9 ACQUIRED HYPOTHYROIDISM: ICD-10-CM

## 2018-04-16 DIAGNOSIS — R53.83 FATIGUE, UNSPECIFIED TYPE: ICD-10-CM

## 2018-04-16 DIAGNOSIS — M79.7 FIBROMYALGIA: ICD-10-CM

## 2018-04-16 LAB
ALBUMIN SERPL-MCNC: 4.7 G/DL (ref 3.2–4.8)
ALBUMIN/GLOB SERPL: 1.6 G/DL (ref 1.5–2.5)
ALP SERPL-CCNC: 64 U/L (ref 25–100)
ALT SERPL W P-5'-P-CCNC: 38 U/L (ref 7–40)
ANION GAP SERPL CALCULATED.3IONS-SCNC: 11 MMOL/L (ref 3–11)
ARTICHOKE IGE QN: 139 MG/DL (ref 0–130)
AST SERPL-CCNC: 25 U/L (ref 0–33)
BASOPHILS # BLD AUTO: 0.04 10*3/MM3 (ref 0–0.2)
BASOPHILS NFR BLD AUTO: 0.5 % (ref 0–1)
BILIRUB SERPL-MCNC: 0.3 MG/DL (ref 0.3–1.2)
BUN BLD-MCNC: 14 MG/DL (ref 9–23)
BUN/CREAT SERPL: 15.6 (ref 7–25)
CALCIUM SPEC-SCNC: 9.7 MG/DL (ref 8.7–10.4)
CHLORIDE SERPL-SCNC: 95 MMOL/L (ref 99–109)
CHOLEST SERPL-MCNC: 223 MG/DL (ref 0–200)
CO2 SERPL-SCNC: 27 MMOL/L (ref 20–31)
CREAT BLD-MCNC: 0.9 MG/DL (ref 0.6–1.3)
DEPRECATED RDW RBC AUTO: 44.6 FL (ref 37–54)
EOSINOPHIL # BLD AUTO: 0.19 10*3/MM3 (ref 0–0.3)
EOSINOPHIL NFR BLD AUTO: 2.4 % (ref 0–3)
ERYTHROCYTE [DISTWIDTH] IN BLOOD BY AUTOMATED COUNT: 14.5 % (ref 11.3–14.5)
ERYTHROCYTE [SEDIMENTATION RATE] IN BLOOD: 47 MM/HR (ref 0–30)
GFR SERPL CREATININE-BSD FRML MDRD: 64 ML/MIN/1.73
GLOBULIN UR ELPH-MCNC: 3 GM/DL
GLUCOSE BLD-MCNC: 388 MG/DL (ref 70–100)
HBA1C MFR BLD: 11.4 % (ref 4.8–5.6)
HCT VFR BLD AUTO: 45 % (ref 34.5–44)
HDLC SERPL-MCNC: 40 MG/DL (ref 40–60)
HGB BLD-MCNC: 13.9 G/DL (ref 11.5–15.5)
IMM GRANULOCYTES # BLD: 0.02 10*3/MM3 (ref 0–0.03)
IMM GRANULOCYTES NFR BLD: 0.3 % (ref 0–0.6)
LYMPHOCYTES # BLD AUTO: 2.54 10*3/MM3 (ref 0.6–4.8)
LYMPHOCYTES NFR BLD AUTO: 32.7 % (ref 24–44)
MCH RBC QN AUTO: 26.2 PG (ref 27–31)
MCHC RBC AUTO-ENTMCNC: 30.9 G/DL (ref 32–36)
MCV RBC AUTO: 84.7 FL (ref 80–99)
MONOCYTES # BLD AUTO: 0.38 10*3/MM3 (ref 0–1)
MONOCYTES NFR BLD AUTO: 4.9 % (ref 0–12)
NEUTROPHILS # BLD AUTO: 4.61 10*3/MM3 (ref 1.5–8.3)
NEUTROPHILS NFR BLD AUTO: 59.5 % (ref 41–71)
PLATELET # BLD AUTO: 272 10*3/MM3 (ref 150–450)
PMV BLD AUTO: 10.6 FL (ref 6–12)
POTASSIUM BLD-SCNC: 4.5 MMOL/L (ref 3.5–5.5)
PROT SERPL-MCNC: 7.7 G/DL (ref 5.7–8.2)
RBC # BLD AUTO: 5.31 10*6/MM3 (ref 3.89–5.14)
SODIUM BLD-SCNC: 133 MMOL/L (ref 132–146)
TRIGL SERPL-MCNC: 668 MG/DL (ref 0–150)
TSH SERPL DL<=0.05 MIU/L-ACNC: 1.77 MIU/ML (ref 0.35–5.35)
WBC NRBC COR # BLD: 7.76 10*3/MM3 (ref 3.5–10.8)

## 2018-04-16 PROCEDURE — 86430 RHEUMATOID FACTOR TEST QUAL: CPT | Performed by: FAMILY MEDICINE

## 2018-04-16 PROCEDURE — 84443 ASSAY THYROID STIM HORMONE: CPT | Performed by: FAMILY MEDICINE

## 2018-04-16 PROCEDURE — 99215 OFFICE O/P EST HI 40 MIN: CPT | Performed by: FAMILY MEDICINE

## 2018-04-16 PROCEDURE — 80061 LIPID PANEL: CPT | Performed by: FAMILY MEDICINE

## 2018-04-16 PROCEDURE — 85025 COMPLETE CBC W/AUTO DIFF WBC: CPT | Performed by: FAMILY MEDICINE

## 2018-04-16 PROCEDURE — 83036 HEMOGLOBIN GLYCOSYLATED A1C: CPT | Performed by: FAMILY MEDICINE

## 2018-04-16 PROCEDURE — 85652 RBC SED RATE AUTOMATED: CPT | Performed by: FAMILY MEDICINE

## 2018-04-16 PROCEDURE — 86038 ANTINUCLEAR ANTIBODIES: CPT | Performed by: FAMILY MEDICINE

## 2018-04-16 PROCEDURE — 36415 COLL VENOUS BLD VENIPUNCTURE: CPT | Performed by: FAMILY MEDICINE

## 2018-04-16 PROCEDURE — 80053 COMPREHEN METABOLIC PANEL: CPT | Performed by: FAMILY MEDICINE

## 2018-04-16 RX ORDER — GLIMEPIRIDE 4 MG/1
4 TABLET ORAL
Qty: 30 TABLET | Refills: 1 | Status: SHIPPED | OUTPATIENT
Start: 2018-04-16 | End: 2018-07-12 | Stop reason: SDUPTHER

## 2018-04-16 NOTE — PROGRESS NOTES
Subjective   Christi Chapa is a 57 y.o. female.   Pt states that she has been on a ketogenic diet. I asked her to  described a ketogenic diet and she describes eating lots of refined sugars and drinking coffee in the morning with heavy cream coconut oil and other added oils. She has been noncompliant with diabetes and with following with endocrine as previously suggested.    Fatigue   This is a chronic (Falls asleep at work; sleeping ok at night) problem. The current episode started more than 1 month ago. The problem occurs constantly. The problem has been unchanged. Associated symptoms include fatigue. Pertinent negatives include no abdominal pain, arthralgias, chest pain, congestion, coughing, fever, headaches, myalgias, nausea, numbness, rash or sore throat. Exacerbated by: diabetes. She has tried nothing for the symptoms. The treatment provided no relief.   Fibromyalgia   This is a chronic (Has has increased pain recently) problem. The current episode started more than 1 year ago. The problem occurs constantly. The problem has been unchanged. Associated symptoms include fatigue. Pertinent negatives include no abdominal pain, arthralgias, chest pain, congestion, coughing, fever, headaches, myalgias, nausea, numbness, rash or sore throat. Nothing aggravates the symptoms. She has tried NSAIDs (Cymbalta) for the symptoms. The treatment provided mild relief.   Diabetes   She presents for her follow-up (Has not had f/u with endo since 8/17. Has not been compliant with diet.) diabetic visit. She has type 2 diabetes mellitus. Her disease course has been fluctuating. There are no hypoglycemic associated symptoms. Pertinent negatives for hypoglycemia include no confusion, dizziness, headaches or nervousness/anxiousness. Associated symptoms include fatigue and foot paresthesias. Pertinent negatives for diabetes include no blurred vision, no chest pain, no foot ulcerations, no polydipsia, no polyphagia, no polyuria and no  weight loss. There are no hypoglycemic complications. Symptoms are stable. There are no diabetic complications. Risk factors for coronary artery disease include diabetes mellitus, dyslipidemia, hypertension, family history, sedentary lifestyle and post-menopausal. Current diabetic treatment includes oral agent (triple therapy). She is compliant with treatment most of the time. Her weight is decreasing steadily. She is following a diabetic and generally unhealthy diet. When asked about meal planning, she reported none. She has had a previous visit with a dietitian. She participates in exercise intermittently. Her home blood glucose trend is decreasing steadily. An ACE inhibitor/angiotensin II receptor blocker is being taken. She sees a podiatrist.Eye exam is current.   Hyperlipidemia   This is a chronic problem. The current episode started more than 1 year ago. The problem is controlled. Recent lipid tests were reviewed and are low. Exacerbating diseases include hypothyroidism and obesity. There are no known factors aggravating her hyperlipidemia. Pertinent negatives include no chest pain, myalgias or shortness of breath. Current antihyperlipidemic treatment includes statins, diet change and fibric acid derivatives. The current treatment provides significant improvement of lipids. There are no compliance problems.  Risk factors for coronary artery disease include diabetes mellitus, dyslipidemia, family history and hypertension.   Hypothyroidism   This is a chronic problem. The current episode started more than 1 year ago. The problem occurs constantly. The problem has been unchanged. Associated symptoms include fatigue. Pertinent negatives include no abdominal pain, arthralgias, chest pain, congestion, coughing, fever, headaches, myalgias, nausea, numbness, rash or sore throat. Nothing aggravates the symptoms. Treatments tried: on Synthroid. The treatment provided significant relief.   C/O right flank pain. Denies  urinary symptoms but has a history of multiple kidney stones. Follows with urology and had a CT scan 1 month ago.    The following portions of the patient's history were reviewed and updated as appropriate: allergies, current medications, past family history, past medical history, past social history, past surgical history and problem list.    Review of Systems   Constitutional: Positive for fatigue. Negative for activity change, fever, unexpected weight gain and unexpected weight loss.   HENT: Negative.  Negative for congestion, sneezing and sore throat.    Eyes: Negative.  Negative for blurred vision, double vision and visual disturbance.   Respiratory: Negative.  Negative for cough, chest tightness, shortness of breath and wheezing.    Cardiovascular: Negative.  Negative for chest pain, palpitations and leg swelling.   Gastrointestinal: Negative.  Negative for abdominal distention, abdominal pain, blood in stool, constipation, diarrhea and nausea.   Endocrine: Negative.  Negative for cold intolerance, heat intolerance, polydipsia, polyphagia and polyuria.   Genitourinary: Negative.  Negative for urinary incontinence, dysuria, frequency and urgency.   Musculoskeletal: Negative.  Negative for arthralgias and myalgias.   Skin: Negative.  Negative for rash.   Allergic/Immunologic: Negative.    Neurological: Negative.  Negative for dizziness, syncope, numbness, headaches, memory problem and confusion.   Hematological: Negative.  Negative for adenopathy.   Psychiatric/Behavioral: Negative.  Negative for suicidal ideas and depressed mood. The patient is not nervous/anxious.    All other systems reviewed and are negative.      Objective   Physical Exam   Constitutional: She is oriented to person, place, and time. She appears well-developed and well-nourished.   HENT:   Head: Normocephalic.   Right Ear: External ear normal.   Left Ear: External ear normal.   Nose: Nose normal.   Mouth/Throat: Oropharynx is clear and  moist. No oropharyngeal exudate.   Eyes: Conjunctivae and EOM are normal. Pupils are equal, round, and reactive to light.   Neck: Normal range of motion. Neck supple. No thyromegaly present.   Cardiovascular: Normal rate, regular rhythm, normal heart sounds and intact distal pulses.    No murmur heard.  Pulmonary/Chest: Effort normal and breath sounds normal. No respiratory distress. She exhibits no tenderness.   Abdominal: Soft. Bowel sounds are normal. She exhibits no distension and no mass. There is no tenderness. There is no rebound and no guarding.   Musculoskeletal: Normal range of motion.   Lymphadenopathy:     She has no cervical adenopathy.   Neurological: She is alert and oriented to person, place, and time. She has normal reflexes. She displays normal reflexes. She exhibits normal muscle tone. Coordination normal.   Skin: Skin is warm and dry. No rash noted. She is not diaphoretic. No erythema.   Psychiatric: She has a normal mood and affect. Her behavior is normal. Judgment and thought content normal.   Nursing note and vitals reviewed.        Assessment/Plan   Christi was seen today for fatigue and fibromyalgia.    Diagnoses and all orders for this visit:    Type 2 diabetes mellitus without complication, without long-term current use of insulin  -     Hemoglobin A1c  -     CBC & Differential  -     Comprehensive Metabolic Panel  -     Lipid Panel  -     CBC Auto Differential    Acquired hypothyroidism  -     TSH    Fibromyalgia    Essential hypertension    Fatigue, unspecified type  -     Ambulatory Referral to Sleep Medicine  -     LINDA  -     Rheumatoid Factor  -     Sedimentation Rate    Abdominal swelling  -     US Abdomen Complete; Future  -     US Pelvis Complete; Future    Other orders  -     glimepiride (AMARYL) 4 MG tablet; Take 1 tablet by mouth Every Morning Before Breakfast.  -     dapagliflozin-metformin HCl ER (XIGDUO XR) 5-1000 MG tablet; Take 1 tablet by mouth Daily. 1 PO BID      I have  discussed in detail with the patient regarding diet. She needs to cut all oils and all refined sugars. Recommend increasing vegetables and low  Glycemic fruits. Change all beverages to water and green tea without sugar.  Discussed the nature of the disease including, risks, complications, implications, management, safe and proper use of medications. Encouraged therapeutic lifestyle changes including low calorie diet with plenty of fruits and vegetables, daily exercise, medication compliance, and keeping scheduled follow up appointments with me and any other providers. Encouraged patient to have appointment for complete physical, fasting labs, appropriate screenings, and immunizations on an annual basis.  I personally spent over half of a total 40 minutes face to face with the patient in counseling and discussion and/or coordination of care as described above.

## 2018-04-17 LAB
ANA SER QL: NEGATIVE
RHEUMATOID FACT SERPL-ACNC: NEGATIVE [IU]/ML

## 2018-05-24 ENCOUNTER — OFFICE VISIT (OUTPATIENT)
Dept: FAMILY MEDICINE CLINIC | Facility: CLINIC | Age: 58
End: 2018-05-24

## 2018-05-24 VITALS
DIASTOLIC BLOOD PRESSURE: 78 MMHG | BODY MASS INDEX: 30.73 KG/M2 | SYSTOLIC BLOOD PRESSURE: 112 MMHG | HEART RATE: 96 BPM | WEIGHT: 168 LBS | RESPIRATION RATE: 18 BRPM | OXYGEN SATURATION: 97 % | TEMPERATURE: 97.9 F

## 2018-05-24 DIAGNOSIS — N76.0 ACUTE VAGINITIS: ICD-10-CM

## 2018-05-24 DIAGNOSIS — R00.2 PALPITATIONS: ICD-10-CM

## 2018-05-24 DIAGNOSIS — I49.9 IRREGULAR HEARTBEAT: ICD-10-CM

## 2018-05-24 DIAGNOSIS — IMO0001 UNCONTROLLED TYPE 2 DIABETES MELLITUS WITHOUT COMPLICATION, WITHOUT LONG-TERM CURRENT USE OF INSULIN: ICD-10-CM

## 2018-05-24 DIAGNOSIS — N39.0 URINARY TRACT INFECTION WITH HEMATURIA, SITE UNSPECIFIED: Primary | ICD-10-CM

## 2018-05-24 DIAGNOSIS — Z82.49 FAMILY HISTORY OF HEART DISEASE: ICD-10-CM

## 2018-05-24 DIAGNOSIS — R31.9 URINARY TRACT INFECTION WITH HEMATURIA, SITE UNSPECIFIED: Primary | ICD-10-CM

## 2018-05-24 LAB
BILIRUB BLD-MCNC: NEGATIVE MG/DL
CLARITY, POC: ABNORMAL
COLOR UR: YELLOW
GLUCOSE UR STRIP-MCNC: NEGATIVE MG/DL
KETONES UR QL: ABNORMAL
LEUKOCYTE EST, POC: ABNORMAL
NITRITE UR-MCNC: NEGATIVE MG/ML
PH UR: 5.5 [PH] (ref 5–8)
PROT UR STRIP-MCNC: ABNORMAL MG/DL
RBC # UR STRIP: ABNORMAL /UL
SP GR UR: 1.01 (ref 1–1.03)
UROBILINOGEN UR QL: NORMAL

## 2018-05-24 PROCEDURE — 99214 OFFICE O/P EST MOD 30 MIN: CPT | Performed by: NURSE PRACTITIONER

## 2018-05-24 PROCEDURE — 87086 URINE CULTURE/COLONY COUNT: CPT | Performed by: NURSE PRACTITIONER

## 2018-05-24 PROCEDURE — 81003 URINALYSIS AUTO W/O SCOPE: CPT | Performed by: NURSE PRACTITIONER

## 2018-05-24 RX ORDER — NYSTATIN 100000 U/G
OINTMENT TOPICAL 2 TIMES DAILY
Qty: 30 G | Refills: 0 | Status: SHIPPED | OUTPATIENT
Start: 2018-05-24 | End: 2018-08-08

## 2018-05-24 RX ORDER — CIPROFLOXACIN 500 MG/1
500 TABLET, FILM COATED ORAL EVERY 12 HOURS SCHEDULED
Qty: 14 TABLET | Refills: 0 | Status: SHIPPED | OUTPATIENT
Start: 2018-05-24 | End: 2018-05-31

## 2018-05-24 RX ORDER — FLUCONAZOLE 100 MG/1
100 TABLET ORAL DAILY
Qty: 7 TABLET | Refills: 0 | Status: SHIPPED | OUTPATIENT
Start: 2018-05-24 | End: 2018-05-31

## 2018-05-24 NOTE — PROGRESS NOTES
Subjective   Christi Chapa is a 57 y.o. female.   Ms. Chapa presents today with c/o vaginal itching, irritation and discharge.    Vaginal Discharge   The patient's primary symptoms include genital itching and vaginal discharge. The patient's pertinent negatives include no genital lesions, genital odor, genital rash, pelvic pain or vaginal bleeding. This is a new problem. The current episode started in the past 7 days. The problem occurs daily. The problem has been gradually worsening. The pain is mild. The problem affects both sides. She is not pregnant. Associated symptoms include dysuria. Pertinent negatives include no abdominal pain, back pain, chills, constipation, diarrhea, discolored urine, fever, flank pain, frequency, hematuria, nausea, urgency or vomiting. The vaginal discharge was white. There has been no bleeding. She has not been passing clots. She has not been passing tissue. The symptoms are aggravated by urinating. She has tried antifungals for the symptoms. The treatment provided no relief. She is not sexually active. No, her partner does not have an STD. She uses nothing for contraception. She is postmenopausal.       The following portions of the patient's history were reviewed and updated as appropriate: allergies, current medications, past family history, past medical history, past social history, past surgical history and problem list.     Current Outpatient Prescriptions on File Prior to Visit   Medication Sig   • ALPRAZolam (XANAX) 1 MG tablet Take  by mouth.   • busPIRone (BUSPAR) 10 MG tablet Take  by mouth.   • dapagliflozin-metformin HCl ER (XIGDUO XR) 5-1000 MG tablet Take 1 tablet by mouth Daily. 1 PO BID   • desipramine (NOPRAMIN) 100 MG tablet Take  by mouth.   • DULoxetine (CYMBALTA) 60 MG capsule Take  by mouth.   • fenofibrate (TRICOR) 145 MG tablet Take 1 tablet by mouth Daily.   • glimepiride (AMARYL) 4 MG tablet Take 1 tablet by mouth Every Morning Before Breakfast.   •  ibuprofen (ADVIL,MOTRIN) 800 MG tablet Take 1 tablet by mouth Every 8 (Eight) Hours As Needed for Mild Pain .   • levothyroxine (SYNTHROID, LEVOTHROID) 25 MCG tablet Take 1 tablet by mouth Daily.   • mirtazapine (REMERON) 30 MG tablet Take 30 mg by mouth Every Night.   • venlafaxine XR (EFFEXOR-XR) 75 MG 24 hr capsule    • pravastatin (PRAVACHOL) 40 MG tablet take 1 tablet by mouth once daily     No current facility-administered medications on file prior to visit.      Allergies   Allergen Reactions   • Atorvastatin    • Simvastatin    • Sulfa Antibiotics    • Trulicity  [Dulaglutide]      Review of Systems   Constitutional: Negative for activity change, appetite change, chills, diaphoresis, fatigue and fever.   HENT: Negative.    Respiratory: Negative.  Negative for chest tightness and shortness of breath.    Cardiovascular: Positive for palpitations (occasional). Negative for chest pain and leg swelling.   Gastrointestinal: Negative for abdominal distention, abdominal pain, blood in stool, constipation, diarrhea, nausea and vomiting.   Genitourinary: Positive for dysuria, vaginal bleeding (occasional bleeding when wipes after urinating) and vaginal discharge. Negative for flank pain, frequency, genital sores, hematuria, pelvic pain and urgency. Vaginal pain: vaginal irritation.        Reports a history of kidney stones and pyelonephritis   Musculoskeletal: Negative for back pain and myalgias.   Skin: Negative.    Neurological: Negative.    Hematological: Negative.    Psychiatric/Behavioral: Negative.        Objective   Physical Exam   Constitutional: She is oriented to person, place, and time. Vital signs are normal. She appears well-developed and well-nourished. She is cooperative. She does not appear ill. No distress.   HENT:   Mouth/Throat: Uvula is midline and mucous membranes are normal.   Neck: Normal range of motion. Neck supple. No thyromegaly present.   Cardiovascular: Normal rate, S1 normal, S2 normal  and normal heart sounds.  A regularly irregular rhythm present.   No murmur heard.  Patient has a history of irregular heartbeat   Pulmonary/Chest: Effort normal and breath sounds normal.   Abdominal: Soft. She exhibits no distension. There is no tenderness. There is no rigidity, no rebound, no guarding and no CVA tenderness.     Vascular Status -  Her right foot exhibits no edema. Her left foot exhibits no edema.  Lymphadenopathy:     She has no cervical adenopathy.   Neurological: She is alert and oriented to person, place, and time.   Skin: Skin is warm, dry and intact. No rash noted. She is not diaphoretic.   Psychiatric: She has a normal mood and affect. Her behavior is normal. Judgment and thought content normal.   Vitals reviewed.    Vitals:    05/24/18 1407   BP: 112/78   Pulse: 96   Resp: 18   Temp: 97.9 °F (36.6 °C)   SpO2: 97%     Assessment/Plan   Christi was seen today for vaginitis.    Diagnoses and all orders for this visit:    Urinary tract infection with hematuria, site unspecified  -     ciprofloxacin (CIPRO) 500 MG tablet; Take 1 tablet by mouth Every 12 (Twelve) Hours for 7 days.    Acute vaginitis  -     POCT urinalysis dipstick, automated  -     nystatin (MYCOSTATIN) 432807 UNIT/GM ointment; Apply  topically 2 (Two) Times a Day.  -     fluconazole (DIFLUCAN) 100 MG tablet; Take 1 tablet by mouth Daily for 7 days.  -     Urine Culture - Urine, Urine, Clean Catch           Palpitations  -     Ambulatory Referral to Cardiology    Family history of heart disease  -     Ambulatory Referral to Cardiology      Brief Urine Lab Results  (Last result in the past 365 days)      Color   Clarity   Blood   Leuk Est   Nitrite   Protein   CREAT   Urine HCG        05/24/18 1423 Yellow Cloudy(A) Small(A) Trace(A) Negative Trace(A)             Advised patient if vaginal and urinary symptoms fail to improve to follow-up with her gynecologist and/or urologist.   To ER if chest pain, SOA, fever, chills, abdominal  pain.  Discussed the nature of the medical condition(s) risks, complications, implications, management, safe and proper use of medications. Encouraged medication compliance, and keeping scheduled follow up appointments with me and any other providers.

## 2018-05-24 NOTE — PATIENT INSTRUCTIONS
Urinary Tract Infection, Adult  A urinary tract infection (UTI) is an infection of any part of the urinary tract, which includes the kidneys, ureters, bladder, and urethra. These organs make, store, and get rid of urine in the body. UTI can be a bladder infection (cystitis) or kidney infection (pyelonephritis).  What are the causes?  This infection may be caused by fungi, viruses, or bacteria. Bacteria are the most common cause of UTIs. This condition can also be caused by repeated incomplete emptying of the bladder during urination.  What increases the risk?  This condition is more likely to develop if:  · You ignore your need to urinate or hold urine for long periods of time.  · You do not empty your bladder completely during urination.  · You wipe back to front after urinating or having a bowel movement, if you are female.  · You are uncircumcised, if you are male.  · You are constipated.  · You have a urinary catheter that stays in place (indwelling).  · You have a weak defense (immune) system.  · You have a medical condition that affects your bowels, kidneys, or bladder.  · You have diabetes.  · You take antibiotic medicines frequently or for long periods of time, and the antibiotics no longer work well against certain types of infections (antibiotic resistance).  · You take medicines that irritate your urinary tract.  · You are exposed to chemicals that irritate your urinary tract.  · You are female.  What are the signs or symptoms?  Symptoms of this condition include:  · Fever.  · Frequent urination or passing small amounts of urine frequently.  · Needing to urinate urgently.  · Pain or burning with urination.  · Urine that smells bad or unusual.  · Cloudy urine.  · Pain in the lower abdomen or back.  · Trouble urinating.  · Blood in the urine.  · Vomiting or being less hungry than normal.  · Diarrhea or abdominal pain.  · Vaginal discharge, if you are female.  How is this diagnosed?  This condition is  diagnosed with a medical history and physical exam. You will also need to provide a urine sample to test your urine. Other tests may be done, including:  · Blood tests.  · Sexually transmitted disease (STD) testing.  If you have had more than one UTI, a cystoscopy or imaging studies may be done to determine the cause of the infections.  How is this treated?  Treatment for this condition often includes a combination of two or more of the following:  · Antibiotic medicine.  · Other medicines to treat less common causes of UTI.  · Over-the-counter medicines to treat pain.  · Drinking enough water to stay hydrated.  Follow these instructions at home:  · Take over-the-counter and prescription medicines only as told by your health care provider.  · If you were prescribed an antibiotic, take it as told by your health care provider. Do not stop taking the antibiotic even if you start to feel better.  · Avoid alcohol, caffeine, tea, and carbonated beverages. They can irritate your bladder.  · Drink enough fluid to keep your urine clear or pale yellow.  · Keep all follow-up visits as told by your health care provider. This is important.  · Make sure to:  ¨ Empty your bladder often and completely. Do not hold urine for long periods of time.  ¨ Empty your bladder before and after sex.  ¨ Wipe from front to back after a bowel movement if you are female. Use each tissue one time when you wipe.  Contact a health care provider if:  · You have back pain.  · You have a fever.  · You feel nauseous or vomit.  · Your symptoms do not get better after 3 days.  · Your symptoms go away and then return.  Get help right away if:  · You have severe back pain or lower abdominal pain.  · You are vomiting and cannot keep down any medicines or water.  This information is not intended to replace advice given to you by your health care provider. Make sure you discuss any questions you have with your health care provider.  Document Released:  09/27/2006 Document Revised: 05/31/2017 Document Reviewed: 11/07/2016  [employer] Interactive Patient Education © 2017 [employer]    Vaginitis  Vaginitis is an inflammation of the vagina. It is most often caused by a change in the normal balance of the bacteria and yeast that live in the vagina. This change in balance causes an overgrowth of certain bacteria or yeast, which causes the inflammation. There are different types of vaginitis, but the most common types are:  · Bacterial vaginosis.  · Yeast infection (candidiasis).  · Trichomoniasis vaginitis. This is a sexually transmitted infection (STI).  · Viral vaginitis.  · Atrophic vaginitis.  · Allergic vaginitis.  What are the causes?  The cause depends on the type of vaginitis. Vaginitis can be caused by:  · Bacteria (bacterial vaginosis).  · Yeast (yeast infection).  · A parasite (trichomoniasis vaginitis)  · A virus (viral vaginitis).  · Low hormone levels (atrophic vaginitis). Low hormone levels can occur during pregnancy, breastfeeding, or after menopause.  · Irritants, such as bubble baths, scented tampons, and feminine sprays (allergic vaginitis).  Other factors can change the normal balance of the yeast and bacteria that live in the vagina. These include:  · Antibiotic medicines.  · Poor hygiene.  · Diaphragms, vaginal sponges, spermicides, birth control pills, and intrauterine devices (IUD).  · Sexual intercourse.  · Infection.  · Uncontrolled diabetes.  · A weakened immune system.  What are the signs or symptoms?  Symptoms can vary depending on the cause of the vaginitis. Common symptoms include:  · Abnormal vaginal discharge.  ¨ The discharge is white, gray, or yellow with bacterial vaginosis.  ¨ The discharge is thick, white, and cheesy with a yeast infection.  ¨ The discharge is frothy and yellow or greenish with trichomoniasis.  · A bad vaginal odor.  ¨ The odor is fishy with bacterial vaginosis.  · Vaginal itching, pain, or swelling.  · Painful  intercourse.  · Pain or burning when urinating.  Sometimes there are no symptoms.  How is this treated?  Treatment will vary depending on the type of infection.  · Bacterial vaginosis and trichomoniasis are often treated with antibiotic creams or pills.  · Yeast infections are often treated with antifungal medicines, such as vaginal creams or suppositories.  · Viral vaginitis has no cure, but symptoms can be treated with medicines that relieve discomfort. Your sexual partner should be treated as well.  · Atrophic vaginitis may be treated with an estrogen cream, pill, suppository, or vaginal ring. If vaginal dryness occurs, lubricants and moisturizing creams may help. You may be told to avoid scented soaps, sprays, or douches.  · Allergic vaginitis treatment involves quitting the use of the product that is causing the problem. Vaginal creams can be used to treat the symptoms.  Follow these instructions at home:  · Take all medicines as directed by your caregiver.  · Keep your genital area clean and dry. Avoid soap and only rinse the area with water.  · Avoid douching. It can remove the healthy bacteria in the vagina.  · Do not use tampons or have sexual intercourse until your vaginitis has been treated. Use sanitary pads while you have vaginitis.  · Wipe from front to back. This avoids the spread of bacteria from the rectum to the vagina.  · Let air reach your genital area. ? Wear cotton underwear to decrease moisture buildup.  ¨ Avoid wearing underwear while you sleep until your vaginitis is gone.  ¨ Avoid tight pants and underwear or nylons without a cotton panel.  ¨ Take off wet clothing (especially bathing suits) as soon as possible.  · Use mild, non-scented products. Avoid using irritants, such as:  ¨ Scented feminine sprays.  ¨ Fabric softeners.  ¨ Scented detergents.  ¨ Scented tampons.  ¨ Scented soaps or bubble baths.  · Practice safe sex and use condoms. Condoms may prevent the spread of trichomoniasis and  viral vaginitis.  Contact a health care provider if:  · You have abdominal pain.  · You have symptoms that last for more than 2-3 days.  · You have a fever and your symptoms suddenly get worse.  This information is not intended to replace advice given to you by your health care provider. Make sure you discuss any questions you have with your health care provider.  Document Released: 10/14/2008 Document Revised: 11/08/2017 Document Reviewed: 11/08/2017  [employer] Interactive Patient Education © 2017 [employer]  Ciprofloxacin tablets  What is this medicine?  CIPROFLOXACIN (sip mabel FLOX a sin) is a quinolone antibiotic. It is used to treat certain kinds of bacterial infections. It will not work for colds, flu, or other viral infections.  This medicine may be used for other purposes; ask your health care provider or pharmacist if you have questions.  COMMON BRAND NAME(S): Cipro  What should I tell my health care provider before I take this medicine?  They need to know if you have any of these conditions:  -bone problems  -history of low levels of potassium in the blood  -joint problems  -irregular heartbeat  -kidney disease  -myasthenia gravis  -seizures  -tendon problems  -tingling of the fingers or toes, or other nerve disorder  -an unusual or allergic reaction to ciprofloxacin, other antibiotics or medicines, foods, dyes, or preservatives  -pregnant or trying to get pregnant  -breast-feeding  How should I use this medicine?  Take this medicine by mouth with a glass of water. Follow the directions on the prescription label. Take your medicine at regular intervals. Do not take your medicine more often than directed. Take all of your medicine as directed even if you think your are better. Do not skip doses or stop your medicine early.  You can take this medicine with food or on an empty stomach. It can be taken with a meal that contains dairy or calcium, but do not take it alone with a dairy product, like milk or  yogurt or calcium-fortified juice.  A special MedGuide will be given to you by the pharmacist with each prescription and refill. Be sure to read this information carefully each time.  Talk to your pediatrician regarding the use of this medicine in children. Special care may be needed.  Overdosage: If you think you have taken too much of this medicine contact a poison control center or emergency room at once.  NOTE: This medicine is only for you. Do not share this medicine with others.  What if I miss a dose?  If you miss a dose, take it as soon as you can. If it is almost time for your next dose, take only that dose. Do not take double or extra doses.  What may interact with this medicine?  Do not take this medicine with any of the following medications:  -cisapride  -dofetilide  -dronedarone  -flibanserin  -lomitapide  -pimozide  -thioridazine  -tizanidine  -ziprasidone  This medicine may also interact with the following medications:  -antacids  -birth control pills  -caffeine  -certain medicines for diabetes, like glipizide or glyburide  -certain medicines that treat or prevent blood clots like warfarin  -clozapine  -cyclosporine  -didanosine (ddI) buffered tablets or powder  -duloxetine  -lanthanum carbonate  -lidocaine  -methotrexate  -multivitamins  -NSAIDS, medicines for pain and inflammation, like ibuprofen or naproxen  -olanzapine  -omeprazole  -other medicines that prolong the QT interval (cause an abnormal heart rhythm)  -phenytoin  -probenecid  -ropinirole  -sevelamer  -sildenafil  -sucralfate  -theophylline  -zolpidem  This list may not describe all possible interactions. Give your health care provider a list of all the medicines, herbs, non-prescription drugs, or dietary supplements you use. Also tell them if you smoke, drink alcohol, or use illegal drugs. Some items may interact with your medicine.  What should I watch for while using this medicine?  Tell your doctor or health care professional if your  symptoms do not improve.  Do not treat diarrhea with over the counter products. Contact your doctor if you have diarrhea that lasts more than 2 days or if it is severe and watery.  You may get drowsy or dizzy. Do not drive, use machinery, or do anything that needs mental alertness until you know how this medicine affects you. Do not stand or sit up quickly, especially if you are an older patient. This reduces the risk of dizzy or fainting spells.  This medicine can make you more sensitive to the sun. Keep out of the sun. If you cannot avoid being in the sun, wear protective clothing and use sunscreen. Do not use sun lamps or tanning beds/booths.  Avoid antacids, aluminum, calcium, iron, magnesium, and zinc products for 6 hours before and 2 hours after taking a dose of this medicine.  What side effects may I notice from receiving this medicine?  Side effects that you should report to your doctor or health care professional as soon as possible:  -allergic reactions like skin rash or hives, swelling of the face, lips, or tongue  -anxious  -confusion  -depressed mood  -diarrhea  -fast, irregular heartbeat  -hallucination, loss of contact with reality  -joint, muscle, or tendon pain or swelling  -pain, tingling, numbness in the hands or feet  -suicidal thoughts or other mood changes  -sunburn  -unusually weak or tired  Side effects that usually do not require medical attention (report to your doctor or health care professional if they continue or are bothersome):  -dry mouth  -headache  -nausea  -trouble sleeping  This list may not describe all possible side effects. Call your doctor for medical advice about side effects. You may report side effects to FDA at 1-192-FDA-8692.  Where should I keep my medicine?  Keep out of the reach of children.  Store at room temperature below 30 degrees C (86 degrees F). Keep container tightly closed. Throw away any unused medicine after the expiration date.  NOTE: This sheet is a  summary. It may not cover all possible information. If you have questions about this medicine, talk to your doctor, pharmacist, or health care provider.  © 2018 [employer] Standard (2017-07-28 14:42:02)  Fluconazole tablets  What is this medicine?  FLUCONAZOLE (floo ALISIA na zole) is an antifungal medicine. It is used to treat certain kinds of fungal or yeast infections.  This medicine may be used for other purposes; ask your health care provider or pharmacist if you have questions.  COMMON BRAND NAME(S): Diflucan  What should I tell my health care provider before I take this medicine?  They need to know if you have any of these conditions:  -history of irregular heart beat  -kidney disease  -an unusual or allergic reaction to fluconazole, other azole antifungals, medicines, foods, dyes, or preservatives  -pregnant or trying to get pregnant  -breast-feeding  How should I use this medicine?  Take this medicine by mouth. Follow the directions on the prescription label. Do not take your medicine more often than directed.  Talk to your pediatrician regarding the use of this medicine in children. Special care may be needed. This medicine has been used in children as young as 6 months of age.  Overdosage: If you think you have taken too much of this medicine contact a poison control center or emergency room at once.  NOTE: This medicine is only for you. Do not share this medicine with others.  What if I miss a dose?  If you miss a dose, take it as soon as you can. If it is almost time for your next dose, take only that dose. Do not take double or extra doses.  What may interact with this medicine?  Do not take this medicine with any of the following medications:  -astemizole  -certain medicines for irregular heart beat like dofetilide, dronedarone, quinidine  -cisapride  -erythromycin  -lomitapide  -other medicines that prolong the QT interval (cause an abnormal heart  rhythm)  -pimozide  -terfenadine  -thioridazine  -tolvaptan  -ziprasidone  This medicine may also interact with the following medications:  -antiviral medicines for HIV or AIDS  -birth control pills  -certain antibiotics like rifabutin, rifampin  -certain medicines for blood pressure like amlodipine, isradipine, felodipine, hydrochlorothiazide, losartan, nifedipine  -certain medicines for cancer like cyclophosphamide, vinblastine, vincristine  -certain medicines for cholesterol like atorvastatin, lovastatin, fluvastatin, simvastatin  -certain medicines for depression, anxiety, or psychotic disturbances like amitriptyline, midazolam, nortriptyline, triazolam  -certain medicines for diabetes like glipizide, glyburide, tolbutamide  -certain medicines for pain like alfentanil, fentanyl, methadone  -certain medicines for seizures like carbamazepine, phenytoin  -certain medicines that treat or prevent blood clots like warfarin  -halofantrine  -medicines that lower your chance of fighting infection like cyclosporine, prednisone, tacrolimus  -NSAIDS, medicines for pain and inflammation, like celecoxib, diclofenac, flurbiprofen, ibuprofen, meloxicam, naproxen  -other medicines for fungal infections  -sirolimus  -theophylline  -tofacitinib  This list may not describe all possible interactions. Give your health care provider a list of all the medicines, herbs, non-prescription drugs, or dietary supplements you use. Also tell them if you smoke, drink alcohol, or use illegal drugs. Some items may interact with your medicine.  What should I watch for while using this medicine?  Visit your doctor or health care professional for regular checkups. If you are taking this medicine for a long time you may need blood work. Tell your doctor if your symptoms do not improve. Some fungal infections need many weeks or months of treatment to cure.  Alcohol can increase possible damage to your liver. Avoid alcoholic drinks.  If you have a  vaginal infection, do not have sex until you have finished your treatment. You can wear a sanitary napkin. Do not use tampons. Wear freshly washed cotton, not synthetic, panties.  What side effects may I notice from receiving this medicine?  Side effects that you should report to your doctor or health care professional as soon as possible:  -allergic reactions like skin rash or itching, hives, swelling of the lips, mouth, tongue, or throat  -dark urine  -feeling dizzy or faint  -irregular heartbeat or chest pain  -redness, blistering, peeling or loosening of the skin, including inside the mouth  -trouble breathing  -unusual bruising or bleeding  -vomiting  -yellowing of the eyes or skin  Side effects that usually do not require medical attention (report to your doctor or health care professional if they continue or are bothersome):  -changes in how food tastes  -diarrhea  -headache  -stomach upset or nausea  This list may not describe all possible side effects. Call your doctor for medical advice about side effects. You may report side effects to FDA at 6-057-FDA-6597.  Where should I keep my medicine?  Keep out of the reach of children.  Store at room temperature below 30 degrees C (86 degrees F). Throw away any medicine after the expiration date.  NOTE: This sheet is a summary. It may not cover all possible information. If you have questions about this medicine, talk to your doctor, pharmacist, or health care provider.  © 2018 [employer] Standard (2014-07-26 19:37:38)  Nystatin skin cream or ointment  What is this medicine?  NYSTATIN (dion STAT in) is an antifungal medicine. It is used to treat certain kinds of fungal or yeast infections of the skin.  This medicine may be used for other purposes; ask your health care provider or pharmacist if you have questions.  COMMON BRAND NAME(S): Mycostatin, Nystex, Pediaderm AF  What should I tell my health care provider before I take this medicine?  They need to know if  you have any of these conditions:  -an unusual or allergic reaction to nystatin, other foods, dyes or preservatives  -pregnant or trying to get pregnant  -breast-feeding  How should I use this medicine?  This medicine is for external use on the skin only. Follow the directions on the prescription label. Wash hands before and after use. If treating a hand or nail infection, wash hands before use only. Apply a thin layer of this medicine to cover the affected skin and surrounding area. You can cover the area with a sterile gauze dressing (bandage). Do not use an airtight bandage (such as a plastic-covered bandage). Do not get the medicine in your eyes. If you do, rinse out with plenty of cool tap water. Use the full course of treatment prescribed, even if you think the infection is getting better. Use at regular intervals. Do not use your medicine more often than directed. Do not use this medicine for any condition other than the one for which it was prescribed.  Talk to your pediatrician regarding the use of this medicine in children. Special care may be needed.  Overdosage: If you think you have taken too much of this medicine contact a poison control center or emergency room at once.  NOTE: This medicine is only for you. Do not share this medicine with others.  What if I miss a dose?  If you miss a dose, use it as soon as you can. If it is almost time for your next dose, use only that dose. Do not use double or extra doses.  What may interact with this medicine?  Interactions are not expected. Do not use any other skin products on the affected area without telling your doctor or health care professional.  This list may not describe all possible interactions. Give your health care provider a list of all the medicines, herbs, non-prescription drugs, or dietary supplements you use. Also tell them if you smoke, drink alcohol, or use illegal drugs. Some items may interact with your medicine.  What should I watch for  while using this medicine?  Tell your doctor or health care professional if your symptoms do not improve after 3 days.  After bathing make sure that your skin is very dry. Fungal infections like moist conditions. Do not walk around barefoot.  To help prevent reinfection, wear freshly washed cotton, not synthetic, clothing.  What side effects may I notice from receiving this medicine?  Side effects that usually do not require medical attention (report to your doctor or health care professional if they continue or are bothersome):  -skin irritation  This list may not describe all possible side effects. Call your doctor for medical advice about side effects. You may report side effects to FDA at 0-335-XUD-6659.  Where should I keep my medicine?  Keep out of the reach of children.  Store at room temperature 15 to 30 degrees C (59 to 86 degrees F). Throw away any unused medicine after the expiration date.  NOTE: This sheet is a summary. It may not cover all possible information. If you have questions about this medicine, talk to your doctor, pharmacist, or health care provider.  © 2018 [employer] (2017-01-18 16:28:30)

## 2018-05-26 LAB — BACTERIA SPEC AEROBE CULT: NORMAL

## 2018-06-18 RX ORDER — PRAVASTATIN SODIUM 40 MG
TABLET ORAL
Qty: 30 TABLET | Refills: 3 | Status: SHIPPED | OUTPATIENT
Start: 2018-06-18 | End: 2018-06-19 | Stop reason: SDUPTHER

## 2018-06-18 RX ORDER — LEVOTHYROXINE SODIUM 0.03 MG/1
TABLET ORAL
Qty: 30 TABLET | Refills: 1 | Status: SHIPPED | OUTPATIENT
Start: 2018-06-18 | End: 2018-06-19 | Stop reason: SDUPTHER

## 2018-06-18 RX ORDER — FENOFIBRATE 145 MG/1
TABLET, COATED ORAL
Qty: 30 TABLET | Refills: 3 | Status: SHIPPED | OUTPATIENT
Start: 2018-06-18 | End: 2018-06-19 | Stop reason: SDUPTHER

## 2018-06-19 ENCOUNTER — TELEPHONE (OUTPATIENT)
Dept: FAMILY MEDICINE CLINIC | Facility: CLINIC | Age: 58
End: 2018-06-19

## 2018-06-19 RX ORDER — PRAVASTATIN SODIUM 40 MG
40 TABLET ORAL DAILY
Qty: 30 TABLET | Refills: 2 | Status: SHIPPED | OUTPATIENT
Start: 2018-06-19 | End: 2018-10-17 | Stop reason: SDUPTHER

## 2018-06-19 RX ORDER — FENOFIBRATE 145 MG/1
145 TABLET, COATED ORAL DAILY
Qty: 30 TABLET | Refills: 2 | Status: SHIPPED | OUTPATIENT
Start: 2018-06-19 | End: 2018-10-17 | Stop reason: SDUPTHER

## 2018-06-19 RX ORDER — LEVOTHYROXINE SODIUM 0.03 MG/1
25 TABLET ORAL DAILY
Qty: 30 TABLET | Refills: 2 | Status: SHIPPED | OUTPATIENT
Start: 2018-06-19 | End: 2018-07-12 | Stop reason: SDUPTHER

## 2018-06-19 NOTE — TELEPHONE ENCOUNTER
----- Message from Rhonda Gunn DO sent at 6/18/2018  7:13 PM EDT -----  30+2 refills  ----- Message -----  From: Magdalena Larson MA  Sent: 6/18/2018   9:06 AM  To: Rhonda Gunn DO        ----- Message -----  From: Claudia Green  Sent: 6/18/2018   8:58 AM  [facility]    PATIENT IS NEEDING A REFILL ON THE FOLLOWING MEDS    fenofibrate (TRICOR) 145 MG table  pravastatin (PRAVACHOL) 40 MG tablet   levothyroxine (SYNTHROID, LEVOTHROID) 25 MCG tablet     PLEASE SEND TO [facility] [address]

## 2018-07-12 ENCOUNTER — OFFICE VISIT (OUTPATIENT)
Dept: FAMILY MEDICINE CLINIC | Facility: CLINIC | Age: 58
End: 2018-07-12

## 2018-07-12 VITALS
DIASTOLIC BLOOD PRESSURE: 74 MMHG | WEIGHT: 168 LBS | OXYGEN SATURATION: 98 % | SYSTOLIC BLOOD PRESSURE: 142 MMHG | HEART RATE: 94 BPM | RESPIRATION RATE: 16 BRPM | BODY MASS INDEX: 30.91 KG/M2 | HEIGHT: 62 IN

## 2018-07-12 DIAGNOSIS — N76.0 ACUTE VAGINITIS: ICD-10-CM

## 2018-07-12 DIAGNOSIS — IMO0001 UNCONTROLLED TYPE 2 DIABETES MELLITUS WITHOUT COMPLICATION, WITHOUT LONG-TERM CURRENT USE OF INSULIN: ICD-10-CM

## 2018-07-12 DIAGNOSIS — R51.9 FRONTAL HEADACHE: ICD-10-CM

## 2018-07-12 DIAGNOSIS — E11.9 CONTROLLED TYPE 2 DIABETES MELLITUS WITHOUT COMPLICATION, WITHOUT LONG-TERM CURRENT USE OF INSULIN (HCC): Primary | ICD-10-CM

## 2018-07-12 DIAGNOSIS — R42 VERTIGO: ICD-10-CM

## 2018-07-12 LAB
ALBUMIN SERPL-MCNC: 4.7 G/DL (ref 3.2–4.8)
ALBUMIN/GLOB SERPL: 1.7 G/DL (ref 1.5–2.5)
ALP SERPL-CCNC: 70 U/L (ref 25–100)
ALT SERPL W P-5'-P-CCNC: 35 U/L (ref 7–40)
ANION GAP SERPL CALCULATED.3IONS-SCNC: 14 MMOL/L (ref 3–11)
ARTICHOKE IGE QN: 140 MG/DL (ref 0–130)
AST SERPL-CCNC: 35 U/L (ref 0–33)
BILIRUB SERPL-MCNC: 0.4 MG/DL (ref 0.3–1.2)
BUN BLD-MCNC: 16 MG/DL (ref 9–23)
BUN/CREAT SERPL: 20.3 (ref 7–25)
CALCIUM SPEC-SCNC: 10 MG/DL (ref 8.7–10.4)
CHLORIDE SERPL-SCNC: 99 MMOL/L (ref 99–109)
CHOLEST SERPL-MCNC: 235 MG/DL (ref 0–200)
CO2 SERPL-SCNC: 24 MMOL/L (ref 20–31)
CREAT BLD-MCNC: 0.79 MG/DL (ref 0.6–1.3)
GFR SERPL CREATININE-BSD FRML MDRD: 75 ML/MIN/1.73
GLOBULIN UR ELPH-MCNC: 2.8 GM/DL
GLUCOSE BLD-MCNC: 257 MG/DL (ref 70–100)
HBA1C MFR BLD: 11.8 %
HDLC SERPL-MCNC: 42 MG/DL (ref 40–60)
POC CREATININE URINE: 50
POC MICROALBUMIN URINE: 80
POTASSIUM BLD-SCNC: 4.2 MMOL/L (ref 3.5–5.5)
PROT SERPL-MCNC: 7.5 G/DL (ref 5.7–8.2)
SODIUM BLD-SCNC: 137 MMOL/L (ref 132–146)
TRIGL SERPL-MCNC: 777 MG/DL (ref 0–150)

## 2018-07-12 PROCEDURE — 80053 COMPREHEN METABOLIC PANEL: CPT | Performed by: FAMILY MEDICINE

## 2018-07-12 PROCEDURE — 99214 OFFICE O/P EST MOD 30 MIN: CPT | Performed by: FAMILY MEDICINE

## 2018-07-12 PROCEDURE — 82044 UR ALBUMIN SEMIQUANTITATIVE: CPT | Performed by: FAMILY MEDICINE

## 2018-07-12 PROCEDURE — 80061 LIPID PANEL: CPT | Performed by: FAMILY MEDICINE

## 2018-07-12 PROCEDURE — 83036 HEMOGLOBIN GLYCOSYLATED A1C: CPT | Performed by: FAMILY MEDICINE

## 2018-07-12 RX ORDER — LEVOTHYROXINE SODIUM 0.03 MG/1
25 TABLET ORAL DAILY
Qty: 30 TABLET | Refills: 2 | Status: SHIPPED | OUTPATIENT
Start: 2018-07-12 | End: 2018-10-17 | Stop reason: SDUPTHER

## 2018-07-12 RX ORDER — MECLIZINE HYDROCHLORIDE 25 MG/1
25 TABLET ORAL 3 TIMES DAILY PRN
Qty: 30 TABLET | Refills: 0 | Status: SHIPPED | OUTPATIENT
Start: 2018-07-12 | End: 2018-08-08

## 2018-07-12 RX ORDER — GLIMEPIRIDE 4 MG/1
4 TABLET ORAL
Qty: 30 TABLET | Refills: 1 | Status: SHIPPED | OUTPATIENT
Start: 2018-07-12 | End: 2018-10-10 | Stop reason: SDUPTHER

## 2018-07-12 RX ORDER — METRONIDAZOLE 7.5 MG/G
GEL TOPICAL EVERY 12 HOURS SCHEDULED
Qty: 45 G | Refills: 0 | Status: SHIPPED | OUTPATIENT
Start: 2018-07-12 | End: 2018-08-08

## 2018-07-12 RX ORDER — FLUCONAZOLE 150 MG/1
150 TABLET ORAL ONCE
Qty: 1 TABLET | Refills: 0 | Status: SHIPPED | OUTPATIENT
Start: 2018-07-12 | End: 2018-07-12

## 2018-07-12 NOTE — PROGRESS NOTES
Subjective   Christi Chapa is a 57 y.o. female.     Diabetes   She presents for her follow-up (Uncontrolled; addicted to sugar; has not seen endo) diabetic visit. She has type 2 diabetes mellitus. Her disease course has been fluctuating. Hypoglycemia symptoms include dizziness. Pertinent negatives for hypoglycemia include no confusion or nervousness/anxiousness. Associated symptoms include fatigue and foot paresthesias. Pertinent negatives for diabetes include no blurred vision, no chest pain, no foot ulcerations, no polydipsia, no polyphagia, no polyuria and no weight loss. There are no hypoglycemic complications. Symptoms are stable. There are no diabetic complications. Risk factors for coronary artery disease include diabetes mellitus, dyslipidemia, hypertension, family history, sedentary lifestyle and post-menopausal. Current diabetic treatment includes oral agent (triple therapy). She is compliant with treatment most of the time. Her weight is decreasing steadily. She is following a diabetic and generally unhealthy diet. When asked about meal planning, she reported none. She has had a previous visit with a dietitian. She participates in exercise intermittently. Her home blood glucose trend is decreasing steadily. An ACE inhibitor/angiotensin II receptor blocker is being taken. She sees a podiatrist.Eye exam is current.   Dizziness   This is a new problem. The current episode started yesterday. The problem occurs constantly. The problem has been unchanged. Associated symptoms include fatigue. Pertinent negatives include no abdominal pain, arthralgias, chest pain, congestion, coughing, fever, myalgias, nausea, numbness, rash or sore throat. The symptoms are aggravated by walking (lying down). She has tried nothing for the symptoms. The treatment provided no relief.   Headache    This is a new problem. The current episode started 1 to 4 weeks ago. The problem occurs constantly. The problem has been unchanged. The  pain is located in the frontal region. The pain does not radiate. The pain quality is similar to prior headaches. The quality of the pain is described as aching. The pain is at a severity of 5/10. The pain is moderate. Associated symptoms include dizziness. Pertinent negatives include no abdominal pain, blurred vision, coughing, fever, nausea, numbness, phonophobia, photophobia, sore throat or weight loss. Exacerbated by: lying down. Treatments tried: chiropractor. The treatment provided no relief.   Hypothyroidism   This is a chronic problem. The current episode started more than 1 year ago. The problem occurs constantly. The problem has been unchanged. Associated symptoms include fatigue. Pertinent negatives include no abdominal pain, arthralgias, chest pain, congestion, coughing, fever, myalgias, nausea, numbness, rash or sore throat. Nothing aggravates the symptoms. Treatments tried: on Synthroid. The treatment provided significant relief.   Vaginitis   This is a new problem. The current episode started in the past 7 days. Associated symptoms include fatigue. Pertinent negatives include no abdominal pain, arthralgias, chest pain, congestion, coughing, fever, myalgias, nausea, numbness, rash or sore throat. Associated symptoms comments: itching. Exacerbated by: uncontrolled DM.        The following portions of the patient's history were reviewed and updated as appropriate: allergies, current medications, past family history, past medical history, past social history, past surgical history and problem list.    Review of Systems   Constitutional: Positive for fatigue. Negative for activity change, fever, unexpected weight gain and unexpected weight loss.   HENT: Negative.  Negative for congestion, sneezing and sore throat.    Eyes: Negative.  Negative for blurred vision, double vision, photophobia and visual disturbance.   Respiratory: Negative.  Negative for cough, chest tightness, shortness of breath and  wheezing.    Cardiovascular: Negative.  Negative for chest pain, palpitations and leg swelling.   Gastrointestinal: Negative.  Negative for abdominal distention, abdominal pain, blood in stool, constipation, diarrhea and nausea.   Endocrine: Negative.  Negative for cold intolerance, heat intolerance, polydipsia, polyphagia and polyuria.   Genitourinary: Negative.  Negative for urinary incontinence, dysuria, frequency and urgency.   Musculoskeletal: Negative.  Negative for arthralgias and myalgias.   Skin: Negative.  Negative for rash.   Allergic/Immunologic: Negative.    Neurological: Positive for dizziness. Negative for syncope, numbness, memory problem and confusion.   Hematological: Negative.  Negative for adenopathy.   Psychiatric/Behavioral: Negative.  Negative for suicidal ideas and depressed mood. The patient is not nervous/anxious.    All other systems reviewed and are negative.      Objective   Physical Exam   Constitutional: She is oriented to person, place, and time. She appears well-developed and well-nourished.   HENT:   Head: Normocephalic.   Right Ear: External ear normal.   Left Ear: External ear normal.   Nose: Nose normal.   Mouth/Throat: Oropharynx is clear and moist. No oropharyngeal exudate.   Eyes: Conjunctivae and EOM are normal. Pupils are equal, round, and reactive to light.   Neck: Normal range of motion. Neck supple. No thyromegaly present.   Cardiovascular: Normal rate, regular rhythm, normal heart sounds and intact distal pulses.    No murmur heard.  Pulmonary/Chest: Effort normal and breath sounds normal. No respiratory distress. She exhibits no tenderness.   Abdominal: Soft. Bowel sounds are normal. She exhibits no distension and no mass. There is no tenderness. There is no rebound and no guarding.   Musculoskeletal: Normal range of motion.   Lymphadenopathy:     She has no cervical adenopathy.   Neurological: She is alert and oriented to person, place, and time. She has normal  reflexes. She displays normal reflexes. She exhibits normal muscle tone. Coordination normal.   Skin: Skin is warm and dry. No rash noted. She is not diaphoretic. No erythema.   Psychiatric: She has a normal mood and affect. Her behavior is normal. Judgment and thought content normal.   Nursing note and vitals reviewed.        Assessment/Plan   Christi was seen today for diabetes, dizziness and headache.    Diagnoses and all orders for this visit:    Controlled type 2 diabetes mellitus without complication, without long-term current use of insulin (CMS/[facility])  -     POC Glycosylated Hemoglobin (Hb A1C)    Uncontrolled type 2 diabetes mellitus without complication, without long-term current use of insulin (CMS/[facility])  -     Comprehensive Metabolic Panel  -     Lipid Panel  -     POC Microalbumin    Vertigo    Frontal headache    Acute vaginitis    Other orders  -     meclizine (ANTIVERT) 25 MG tablet; Take 1 tablet by mouth 3 (Three) Times a Day As Needed for dizziness.  -     dapagliflozin-metformin HCl ER (XIGDUO XR) 5-1000 MG tablet; Take 1 tablet by mouth Daily. 1 PO BID  -     glimepiride (AMARYL) 4 MG tablet; Take 1 tablet by mouth Every Morning Before Breakfast.  -     levothyroxine (SYNTHROID, LEVOTHROID) 25 MCG tablet; Take 1 tablet by mouth Daily.  -     Liraglutide (VICTOZA) 18 MG/3ML solution pen-injector injection; Inject 1.2 mg under the skin Daily.  -     fluconazole (DIFLUCAN) 150 MG tablet; Take 1 tablet by mouth 1 (One) Time for 1 dose.  -     metroNIDAZOLE (METROGEL) 0.75 % gel; Apply  topically Every 12 (Twelve) Hours.        Add Victoza qd  Discussed the nature of the disease including, risks, complications, implications, management, safe and proper use of medications. Encouraged therapeutic lifestyle changes including low calorie diet with plenty of fruits and vegetables, daily exercise, medication compliance, and keeping scheduled follow up appointments with me and any other providers. Encouraged  patient to have appointment for complete physical, fasting labs, appropriate screenings, and immunizations on an annual basis.  Today I have spent a total of 25 minutes face to face with Christi Chapa.  During that time, a total of 15 minutes was spent counseling on diabetes and compliance. Discussed starting Victoza and side effects. Discussed continuing current medications. Discussed follow-up for hemoglobin A1c in 1 month. Patient is to return sooner if her symptoms of headache or dizziness worsen or persist.

## 2018-07-16 ENCOUNTER — OFFICE VISIT (OUTPATIENT)
Dept: FAMILY MEDICINE CLINIC | Facility: CLINIC | Age: 58
End: 2018-07-16

## 2018-07-16 VITALS
HEART RATE: 104 BPM | DIASTOLIC BLOOD PRESSURE: 84 MMHG | HEIGHT: 62 IN | BODY MASS INDEX: 30.18 KG/M2 | TEMPERATURE: 97.6 F | RESPIRATION RATE: 18 BRPM | SYSTOLIC BLOOD PRESSURE: 124 MMHG | WEIGHT: 164 LBS | OXYGEN SATURATION: 97 %

## 2018-07-16 DIAGNOSIS — N39.0 ACUTE UTI: Primary | ICD-10-CM

## 2018-07-16 LAB
BILIRUB BLD-MCNC: NEGATIVE MG/DL
CLARITY, POC: ABNORMAL
COLOR UR: YELLOW
EXPIRATION DATE: ABNORMAL
GLUCOSE UR STRIP-MCNC: ABNORMAL MG/DL
KETONES UR QL: ABNORMAL
LEUKOCYTE EST, POC: ABNORMAL
Lab: ABNORMAL
NITRITE UR-MCNC: NEGATIVE MG/ML
PH UR: 5.5 [PH] (ref 5–8)
PROT UR STRIP-MCNC: ABNORMAL MG/DL
RBC # UR STRIP: ABNORMAL /UL
SP GR UR: 1.01 (ref 1–1.03)
UROBILINOGEN UR QL: NORMAL

## 2018-07-16 PROCEDURE — 81003 URINALYSIS AUTO W/O SCOPE: CPT | Performed by: FAMILY MEDICINE

## 2018-07-16 PROCEDURE — 99213 OFFICE O/P EST LOW 20 MIN: CPT | Performed by: FAMILY MEDICINE

## 2018-07-16 RX ORDER — OMEGA-3-ACID ETHYL ESTERS 1 G/1
2 CAPSULE, LIQUID FILLED ORAL DAILY
Qty: 30 CAPSULE | Refills: 3 | Status: SHIPPED | OUTPATIENT
Start: 2018-07-16 | End: 2018-09-05

## 2018-07-16 RX ORDER — PHENAZOPYRIDINE HYDROCHLORIDE 100 MG/1
100 TABLET, FILM COATED ORAL 3 TIMES DAILY PRN
Qty: 10 TABLET | Refills: 0 | Status: SHIPPED | OUTPATIENT
Start: 2018-07-16 | End: 2018-08-08

## 2018-07-16 RX ORDER — NITROFURANTOIN 25; 75 MG/1; MG/1
100 CAPSULE ORAL EVERY 12 HOURS SCHEDULED
Qty: 14 CAPSULE | Refills: 0 | Status: SHIPPED | OUTPATIENT
Start: 2018-07-16 | End: 2018-08-08

## 2018-07-16 NOTE — PROGRESS NOTES
"Subjective   Christi Chapa is a 57 y.o. female.     Urinary Tract Infection    This is a new problem. The current episode started yesterday. The quality of the pain is described as burning and aching. The pain is moderate. There has been no fever. Associated symptoms include chills and frequency. She has tried nothing for the symptoms.        The following portions of the patient's history were reviewed and updated as appropriate: allergies, current medications, past social history and problem list.    Review of Systems   Constitutional: Positive for chills.   Respiratory: Negative for shortness of breath.    Cardiovascular: Negative for chest pain.   Genitourinary: Positive for dysuria and frequency.       Objective   /84   Pulse 104   Temp 97.6 °F (36.4 °C) (Tympanic)   Resp 18   Ht 157.5 cm (62\")   Wt 74.4 kg (164 lb)   SpO2 97%   BMI 30.00 kg/m²   Physical Exam   Constitutional: She appears well-developed and well-nourished.   HENT:   Right Ear: External ear normal.   Left Ear: External ear normal.   Mouth/Throat: Oropharynx is clear and moist.   Nursing note and vitals reviewed.      Assessment/Plan   Problem List Items Addressed This Visit     None      Visit Diagnoses     Acute UTI    -  Primary    Relevant Orders    POCT urinalysis dipstick, automated (Completed)              Drink plenty fluids.    Rx for Macrobid 100 mg twice a day #14+0.  Rx for Pyridium 100 mg three a day. #10+0.    Follow up as needed              Scribed for Dr Juan Blount by Parris Wright CMA.          I, Juan Blount MD, personally performed the services described in this documentation, as scribed by Parris Wright in my presence, and is both accurate and complete.      "

## 2018-07-17 ENCOUNTER — TELEPHONE (OUTPATIENT)
Dept: FAMILY MEDICINE CLINIC | Facility: CLINIC | Age: 58
End: 2018-07-17

## 2018-07-17 NOTE — TELEPHONE ENCOUNTER
----- Message from Rhonda Gunn DO sent at 7/16/2018  9:35 AM EDT -----  Please have pt NOT take Welchol for now. I am calling in high dose fish oil tablet Lovaza

## 2018-07-22 ENCOUNTER — OFFICE VISIT (OUTPATIENT)
Dept: FAMILY MEDICINE CLINIC | Facility: CLINIC | Age: 58
End: 2018-07-22

## 2018-07-22 VITALS
OXYGEN SATURATION: 98 % | HEIGHT: 62 IN | BODY MASS INDEX: 29.26 KG/M2 | WEIGHT: 159 LBS | RESPIRATION RATE: 18 BRPM | DIASTOLIC BLOOD PRESSURE: 68 MMHG | SYSTOLIC BLOOD PRESSURE: 102 MMHG | HEART RATE: 94 BPM | TEMPERATURE: 97.2 F

## 2018-07-22 DIAGNOSIS — R10.32 LEFT LOWER QUADRANT PAIN: Primary | ICD-10-CM

## 2018-07-22 DIAGNOSIS — R19.7 DIARRHEA, UNSPECIFIED TYPE: ICD-10-CM

## 2018-07-22 DIAGNOSIS — R30.0 DYSURIA: ICD-10-CM

## 2018-07-22 LAB
ALBUMIN SERPL-MCNC: 5.1 G/DL (ref 3.2–4.8)
ALBUMIN/GLOB SERPL: 1.6 G/DL (ref 1.5–2.5)
ALP SERPL-CCNC: 70 U/L (ref 25–100)
ALT SERPL W P-5'-P-CCNC: 51 U/L (ref 7–40)
ANION GAP SERPL CALCULATED.3IONS-SCNC: 11 MMOL/L (ref 3–11)
AST SERPL-CCNC: 28 U/L (ref 0–33)
BILIRUB BLD-MCNC: NEGATIVE MG/DL
BILIRUB SERPL-MCNC: 0.4 MG/DL (ref 0.3–1.2)
BUN BLD-MCNC: 18 MG/DL (ref 9–23)
BUN/CREAT SERPL: 22.8 (ref 7–25)
CALCIUM SPEC-SCNC: 9.5 MG/DL (ref 8.7–10.4)
CHLORIDE SERPL-SCNC: 104 MMOL/L (ref 99–109)
CLARITY, POC: ABNORMAL
CO2 SERPL-SCNC: 25 MMOL/L (ref 20–31)
COLOR UR: YELLOW
CREAT BLD-MCNC: 0.79 MG/DL (ref 0.6–1.3)
EXPIRATION DATE: ABNORMAL
GFR SERPL CREATININE-BSD FRML MDRD: 75 ML/MIN/1.73
GLOBULIN UR ELPH-MCNC: 3.1 GM/DL
GLUCOSE BLD-MCNC: 114 MG/DL (ref 70–100)
GLUCOSE UR STRIP-MCNC: ABNORMAL MG/DL
HCT VFR BLDA CALC: 44.3 %
HGB BLDA-MCNC: 14.4 G/DL
KETONES UR QL: ABNORMAL
LEUKOCYTE EST, POC: ABNORMAL
Lab: ABNORMAL
MCH, POC: 26.4
MCHC, POC: 32.5
MCV, POC: 81.2
NITRITE UR-MCNC: NEGATIVE MG/ML
PH UR: 6 [PH] (ref 5–8)
PLATELET # BLD AUTO: 281 10*3/MM3
PMV BLD: 7.8 FL
POTASSIUM BLD-SCNC: 4.6 MMOL/L (ref 3.5–5.5)
PROT SERPL-MCNC: 8.2 G/DL (ref 5.7–8.2)
PROT UR STRIP-MCNC: NEGATIVE MG/DL
RBC # UR STRIP: ABNORMAL /UL
RBC, POC: 5.46
RDW, POC: 14.2
SODIUM BLD-SCNC: 140 MMOL/L (ref 132–146)
SP GR UR: 1.01 (ref 1–1.03)
UROBILINOGEN UR QL: NORMAL
WBC # BLD: 7.8 10*3/UL

## 2018-07-22 PROCEDURE — 36415 COLL VENOUS BLD VENIPUNCTURE: CPT | Performed by: NURSE PRACTITIONER

## 2018-07-22 PROCEDURE — 99214 OFFICE O/P EST MOD 30 MIN: CPT | Performed by: NURSE PRACTITIONER

## 2018-07-22 PROCEDURE — 87086 URINE CULTURE/COLONY COUNT: CPT | Performed by: NURSE PRACTITIONER

## 2018-07-22 PROCEDURE — 80053 COMPREHEN METABOLIC PANEL: CPT | Performed by: NURSE PRACTITIONER

## 2018-07-22 PROCEDURE — 85027 COMPLETE CBC AUTOMATED: CPT | Performed by: NURSE PRACTITIONER

## 2018-07-22 PROCEDURE — 81002 URINALYSIS NONAUTO W/O SCOPE: CPT | Performed by: NURSE PRACTITIONER

## 2018-07-22 NOTE — PROGRESS NOTES
Subjective   Christi Chapa is a 57 y.o. female.   Chief Complaint   Patient presents with   • Diarrhea     started on wed night...pt states that she had diarrhea every half hour. then it stops and starts back      Diarrhea    This is a new problem. The current episode started in the past 7 days. Episode frequency: stomach cramps yesterday - one diarrhea stool.  The problem has been gradually worsening. The stool consistency is described as watery. The patient states that diarrhea awakens her from sleep. Associated symptoms include abdominal pain, chills and headaches. Pertinent negatives include no coughing or fever. Exacerbated by: eating. Risk factors include recent antibiotic use (microbid for UTI - due to finish later today. ). The treatment provided no relief. Her past medical history is significant for malabsorption. There is no history of bowel resection. has loose bm once started - victoza    Started new diabetic pen on 07/12/18 - Stopped taking the Victoza on Thursday - 4 days ago.  Diarrhea started 5 days ago - then almost stopped yesterday but has restarted today with brown watery stool.   Able to eat small amounts - drinking Power -aid.   Continuing to take all medications except the victoza.     Hx of gastro paresis / and irritable bowel syndrome - left lower side pain.     She is on macrobid for a UTI - started on 07/16/18.     The following portions of the patient's history were reviewed and updated as appropriate: allergies, current medications, past family history, past medical history, past social history, past surgical history and problem list.    Review of Systems   Constitutional: Positive for chills. Negative for fever.   Respiratory: Negative for cough.    Gastrointestinal: Positive for abdominal pain and diarrhea.        Left lower quadrant pain    Genitourinary: Positive for dysuria, frequency and urgency. Negative for hematuria.   Musculoskeletal: Positive for back pain.   Skin: Negative.   "  Neurological: Positive for headaches.   Hematological: Negative.    Psychiatric/Behavioral: Negative.        Objective   Allergies   Allergen Reactions   • Atorvastatin    • Simvastatin    • Trulicity  [Dulaglutide]    • Sulfa Antibiotics Rash     Visit Vitals  /68   Pulse 94   Temp 97.2 °F (36.2 °C)   Resp 18   Ht 157.5 cm (62\")   Wt 72.1 kg (159 lb)   SpO2 98%   BMI 29.08 kg/m²         Physical Exam   Constitutional: She is oriented to person, place, and time.   Cardiovascular: Normal rate and regular rhythm.    Pulmonary/Chest: Effort normal and breath sounds normal.   Abdominal: There is tenderness in the left lower quadrant.   Neurological: She is alert and oriented to person, place, and time.   Skin: Skin is warm, dry and intact. Capillary refill takes less than 2 seconds.   Psychiatric: She has a normal mood and affect. Her behavior is normal.   Vitals reviewed.      Assessment/Plan   Christi was seen today for diarrhea.    Diagnoses and all orders for this visit:    Left lower quadrant pain  -     POCT CBC    Diarrhea, unspecified type  -     Stool Culture With Yersinia - Stool, Per Rectum  -     Clostridium Difficile Toxin - Stool, Per Rectum  -     Comprehensive Metabolic Panel    Dysuria  -     POC Urinalysis Dipstick  -     Urine Culture - Urine, Urine, Clean Catch    POCT CBC is normal.   POCT urinalysis - abnormal   Will check urine culture and CMP.   Follow up with Dr. Gunn this week.   Reviewed patient's last 5 urine cultures - 4 grew out   Patient has stopped taking the victoza.   Will check stool for culture and cdiff.   She has been instructed on stool collection.     Patient declined an ultra sound of the abdomen.  She is agreeable to follow up this week with Dr. Gunn.        Patient Instructions   Diarrhea, Adult  Diarrhea is frequent loose and watery bowel movements. Diarrhea can make you feel weak and cause you to become dehydrated. Dehydration can make you tired and thirsty, cause " you to have a dry mouth, and decrease how often you urinate. Diarrhea typically lasts 2-3 days. However, it can last longer if it is a sign of something more serious. It is important to treat your diarrhea as told by your health care provider.  Follow these instructions at home:  Eating and drinking    Follow these recommendations as told by your health care provider:  · Take an oral rehydration solution (ORS). This is a drink that is sold at pharmacies and retail stores.  · Drink clear fluids, such as water, ice chips, diluted fruit juice, and low-calorie sports drinks.  · Eat bland, easy-to-digest foods in small amounts as you are able. These foods include bananas, applesauce, rice, lean meats, toast, and crackers.  · Avoid drinking fluids that contain a lot of sugar or caffeine, such as energy drinks, sports drinks, and soda.  · Avoid alcohol.  · Avoid spicy or fatty foods.    General instructions  · Drink enough fluid to keep your urine clear or pale yellow.  · Wash your hands often. If soap and water are not available, use hand .  · Make sure that all people in your household wash their hands well and often.  · Take over-the-counter and prescription medicines only as told by your health care provider.  · Rest at home while you recover.  · Watch your condition for any changes.  · Take a warm bath to relieve any burning or pain from frequent diarrhea episodes.  · Keep all follow-up visits as told by your health care provider. This is important.  Contact a health care provider if:  · You have a fever.  · Your diarrhea gets worse.  · You have new symptoms.  · You cannot keep fluids down.  · You feel light-headed or dizzy.  · You have a headache  · You have muscle cramps.  Get help right away if:  · You have chest pain.  · You feel extremely weak or you faint.  · You have bloody or black stools or stools that look like tar.  · You have severe pain, cramping, or bloating in your abdomen.  · You have trouble  breathing or you are breathing very quickly.  · Your heart is beating very quickly.  · Your skin feels cold and clammy.  · You feel confused.  · You have signs of dehydration, such as:  ? Dark urine, very little urine, or no urine.  ? Cracked lips.  ? Dry mouth.  ? Sunken eyes.  ? Sleepiness.  ? Weakness.  This information is not intended to replace advice given to you by your health care provider. Make sure you discuss any questions you have with your health care provider.  Document Released: 12/08/2003 Document Revised: 04/27/2017 Document Reviewed: 08/23/2016  [employer] Interactive Patient Education © 2018 [employer].        Cassie Davila, APRN

## 2018-07-22 NOTE — PATIENT INSTRUCTIONS
Diarrhea, Adult  Diarrhea is frequent loose and watery bowel movements. Diarrhea can make you feel weak and cause you to become dehydrated. Dehydration can make you tired and thirsty, cause you to have a dry mouth, and decrease how often you urinate. Diarrhea typically lasts 2-3 days. However, it can last longer if it is a sign of something more serious. It is important to treat your diarrhea as told by your health care provider.  Follow these instructions at home:  Eating and drinking    Follow these recommendations as told by your health care provider:  · Take an oral rehydration solution (ORS). This is a drink that is sold at pharmacies and retail stores.  · Drink clear fluids, such as water, ice chips, diluted fruit juice, and low-calorie sports drinks.  · Eat bland, easy-to-digest foods in small amounts as you are able. These foods include bananas, applesauce, rice, lean meats, toast, and crackers.  · Avoid drinking fluids that contain a lot of sugar or caffeine, such as energy drinks, sports drinks, and soda.  · Avoid alcohol.  · Avoid spicy or fatty foods.    General instructions  · Drink enough fluid to keep your urine clear or pale yellow.  · Wash your hands often. If soap and water are not available, use hand .  · Make sure that all people in your household wash their hands well and often.  · Take over-the-counter and prescription medicines only as told by your health care provider.  · Rest at home while you recover.  · Watch your condition for any changes.  · Take a warm bath to relieve any burning or pain from frequent diarrhea episodes.  · Keep all follow-up visits as told by your health care provider. This is important.  Contact a health care provider if:  · You have a fever.  · Your diarrhea gets worse.  · You have new symptoms.  · You cannot keep fluids down.  · You feel light-headed or dizzy.  · You have a headache  · You have muscle cramps.  Get help right away if:  · You have chest  pain.  · You feel extremely weak or you faint.  · You have bloody or black stools or stools that look like tar.  · You have severe pain, cramping, or bloating in your abdomen.  · You have trouble breathing or you are breathing very quickly.  · Your heart is beating very quickly.  · Your skin feels cold and clammy.  · You feel confused.  · You have signs of dehydration, such as:  ? Dark urine, very little urine, or no urine.  ? Cracked lips.  ? Dry mouth.  ? Sunken eyes.  ? Sleepiness.  ? Weakness.  This information is not intended to replace advice given to you by your health care provider. Make sure you discuss any questions you have with your health care provider.  Document Released: 12/08/2003 Document Revised: 04/27/2017 Document Reviewed: 08/23/2016  "[employer] Interactive Patient Education © 2018 [employer] Inc.

## 2018-07-24 LAB — BACTERIA SPEC AEROBE CULT: NORMAL

## 2018-08-08 ENCOUNTER — OFFICE VISIT (OUTPATIENT)
Dept: FAMILY MEDICINE CLINIC | Facility: CLINIC | Age: 58
End: 2018-08-08

## 2018-08-08 VITALS
OXYGEN SATURATION: 98 % | HEART RATE: 87 BPM | DIASTOLIC BLOOD PRESSURE: 90 MMHG | WEIGHT: 160.7 LBS | SYSTOLIC BLOOD PRESSURE: 138 MMHG | BODY MASS INDEX: 29.57 KG/M2 | HEIGHT: 62 IN

## 2018-08-08 DIAGNOSIS — T50.905A DRUG-INDUCED NAUSEA AND VOMITING: ICD-10-CM

## 2018-08-08 DIAGNOSIS — K31.84 GASTROPARESIS: Primary | ICD-10-CM

## 2018-08-08 DIAGNOSIS — R11.2 DRUG-INDUCED NAUSEA AND VOMITING: ICD-10-CM

## 2018-08-08 DIAGNOSIS — IMO0002 UNCONTROLLED TYPE 2 DIABETES MELLITUS WITH COMPLICATION, WITH LONG-TERM CURRENT USE OF INSULIN: ICD-10-CM

## 2018-08-08 DIAGNOSIS — T50.905A MEDICATION SIDE EFFECT, INITIAL ENCOUNTER: ICD-10-CM

## 2018-08-08 DIAGNOSIS — F41.9 ANXIETY: ICD-10-CM

## 2018-08-08 PROCEDURE — 99213 OFFICE O/P EST LOW 20 MIN: CPT | Performed by: FAMILY MEDICINE

## 2018-08-08 RX ORDER — ONDANSETRON 4 MG/1
4 TABLET, ORALLY DISINTEGRATING ORAL EVERY 8 HOURS PRN
Qty: 10 TABLET | Refills: 0 | Status: SHIPPED | OUTPATIENT
Start: 2018-08-08 | End: 2019-02-15

## 2018-08-08 NOTE — PROGRESS NOTES
Subjective   Christi Chapa is a 57 y.o. female.     Pt is here due to abdominal and pain diarrhea and abdominal swelling.  Pt was started on victoza on July 12th and that's when the diarrhea started. Stopped Victoza 3 weeks ago and diarrhea went away after 3-4 days. Tried Victoza again this past Saturday and diarrhea and abdominal pain returned.  Pt states she has gastroporis and does not take medication due to long term side effects. Has not had problems in some time until starting the Victoza.    Pt is requesting new psychiatrist referral due to insurance changing.    Nausea   This is a new problem. The current episode started 1 to 4 weeks ago. The problem occurs constantly. The problem has been waxing and waning. Associated symptoms include anorexia, a change in bowel habit, nausea and vomiting. Pertinent negatives include no abdominal pain, arthralgias, chest pain, chills, congestion, coughing, diaphoresis, fatigue, numbness, swollen glands, urinary symptoms or weakness. The symptoms are aggravated by eating and drinking. Treatments tried: dc victoza. The treatment provided moderate relief.      She reports she is needed to see pych that rx xanax .  She is taking 4 mg daily.  Prior psych she states will not accept insurance when he has disability, which she is currently applying for.      The following portions of the patient's history were reviewed and updated as appropriate: allergies, current medications, past family history, past medical history, past social history, past surgical history and problem list.    Review of Systems   Constitutional: Negative for chills, diaphoresis and fatigue.   HENT: Negative for congestion.    Respiratory: Negative for cough.    Cardiovascular: Negative for chest pain.   Gastrointestinal: Positive for anorexia, change in bowel habit, nausea and vomiting. Negative for abdominal pain.   Musculoskeletal: Negative for arthralgias.   Neurological: Negative for weakness and  "numbness.       Objective     Vitals:    08/08/18 1330   BP: 138/90   Pulse: 87   SpO2: 98%   Weight: 72.9 kg (160 lb 11.2 oz)   Height: 157.5 cm (62\")       Physical Exam   Constitutional: She is oriented to person, place, and time. She appears well-developed and well-nourished.   HENT:   Head: Normocephalic and atraumatic.   Eyes: Pupils are equal, round, and reactive to light. EOM are normal. Right eye exhibits no discharge. Left eye exhibits no discharge.   Neck: Normal range of motion. Neck supple.   Cardiovascular: Normal rate, regular rhythm, normal heart sounds and intact distal pulses.    Pulmonary/Chest: Effort normal and breath sounds normal.   Abdominal: Soft. Bowel sounds are normal. She exhibits no mass. There is no tenderness.   Musculoskeletal: Normal range of motion.        Right shoulder: She exhibits no swelling.   Neurological: She is alert and oriented to person, place, and time. She has normal reflexes.   Skin: Skin is warm and dry. No cyanosis. Nails show no clubbing.   Psychiatric: She has a normal mood and affect. Her behavior is normal. Judgment and thought content normal.   Nursing note and vitals reviewed.      Assessment/Plan     Problem List Items Addressed This Visit        Digestive    Gastroparesis - Primary    Drug-induced nausea and vomiting    Relevant Medications    ondansetron ODT (ZOFRAN ODT) 4 MG disintegrating tablet       Endocrine    Uncontrolled type 2 diabetes mellitus (CMS/HCC)    Relevant Medications    linagliptin (TRADJENTA) 5 MG tablet tablet       Other    Anxiety    Relevant Orders    Ambulatory Referral to Psychiatry    Medication side effect, initial encounter        I have offered to try tradjenta 5 mg.    She has decided to wait to discuss with Dr Gunn.    She already has zofran at home  I have sent to psych.      "

## 2018-08-17 ENCOUNTER — TELEPHONE (OUTPATIENT)
Dept: FAMILY MEDICINE CLINIC | Facility: CLINIC | Age: 58
End: 2018-08-17

## 2018-08-17 DIAGNOSIS — N95.0 POSTMENOPAUSAL BLEEDING: Primary | ICD-10-CM

## 2018-08-17 NOTE — TELEPHONE ENCOUNTER
----- Message from Rhonda Gunn DO sent at 8/17/2018 10:15 AM EDT -----  Regarding: FW: REFERRAL  Contact: [phone]  Yes, I will refer to GYN  ----- Message -----  From: Magdalena Gonzalez MA  Sent: 8/16/2018   8:22 AM  To: Rhonda Gunn DO  Subject: FW: REFERRAL                                         ----- Message -----  From: Lory Townsend, RegSched Rep  Sent: 8/15/2018   4:49 PM  To: Amaris Underwood MA  Subject: REFERRAL                                         PT WOULD LIKE TO KNOW IF SHE CAN GET A REFERRAL TO SEE A GYNECOLOGIST? SHE IS KEEPING A YEAST INFECTION AND SOME SPOTTING.

## 2018-09-05 ENCOUNTER — OFFICE VISIT (OUTPATIENT)
Dept: OBSTETRICS AND GYNECOLOGY | Facility: CLINIC | Age: 58
End: 2018-09-05

## 2018-09-05 VITALS
SYSTOLIC BLOOD PRESSURE: 138 MMHG | BODY MASS INDEX: 31.17 KG/M2 | WEIGHT: 169.4 LBS | HEIGHT: 62 IN | DIASTOLIC BLOOD PRESSURE: 70 MMHG

## 2018-09-05 DIAGNOSIS — N60.11 FIBROCYSTIC BREAST CHANGES, BILATERAL: ICD-10-CM

## 2018-09-05 DIAGNOSIS — Z78.0 MENOPAUSE: Primary | ICD-10-CM

## 2018-09-05 DIAGNOSIS — Z01.419 ENCOUNTER FOR GYNECOLOGICAL EXAMINATION WITHOUT ABNORMAL FINDING: ICD-10-CM

## 2018-09-05 DIAGNOSIS — N95.2 VAGINAL ATROPHY: ICD-10-CM

## 2018-09-05 DIAGNOSIS — N60.12 FIBROCYSTIC BREAST CHANGES, BILATERAL: ICD-10-CM

## 2018-09-05 PROCEDURE — 99386 PREV VISIT NEW AGE 40-64: CPT | Performed by: OBSTETRICS & GYNECOLOGY

## 2018-09-05 RX ORDER — MELATONIN
1000 DAILY
COMMUNITY
End: 2021-09-20

## 2018-09-05 RX ORDER — VENLAFAXINE HYDROCHLORIDE 37.5 MG/1
1 CAPSULE, EXTENDED RELEASE ORAL DAILY
Refills: 0 | COMMUNITY
Start: 2018-08-22 | End: 2018-11-14

## 2018-09-05 RX ORDER — MIRTAZAPINE 15 MG/1
1 TABLET, FILM COATED ORAL DAILY
Refills: 0 | COMMUNITY
Start: 2018-08-23 | End: 2018-11-14

## 2018-09-05 RX ORDER — LANOLIN ALCOHOL/MO/W.PET/CERES
1 CREAM (GRAM) TOPICAL DAILY
COMMUNITY

## 2018-09-05 NOTE — PROGRESS NOTES
"   Chief Complaint   Patient presents with   • Gynecologic Exam   • Vaginal Bleeding     post-menopausal bleeding.  occurs if \"I wipe too hard\"   • Vaginitis     itching   • Dyspareunia       Christi Chapa is a 57 y.o. year old  presenting to be seen for her annual exam.  This is her first gynecologic, wellness visit with me.  This patient has had 1 vaginal delivery.  Her daughter passed away at 25 years of age.  She has previously had an appendectomy.  She has had a cholecystectomy.  She has a history of a conization of her cervix.  She is treated for hypertension, dyslipidemia, diabetes mellitus-2.  She states that she has had galactorrhea in the past but has none now.  She has no postmenopausal bleeding.  She has complaints of severe vulvar and vaginal dryness and irritation and occasionally notices a spot of blood when she wipes after voiding.  She has some incontinence of stool.    SCREENING TESTS    Year 2012 [id number]   Age                         PAP                         HPV high risk                         Mammogram      benign                   NICOLE score                         Breast MRI                         Lipids                         Vitamin D                         Colonoscopy                         DEXA  Frax (hip/any)                         Ovarian Screen                             She exercises regularly: no.  She wears her seat belt: yes.  She has concerns about domestic violence: no.  She has noticed changes in height: no    GYN screening history:  · Last mammogram: was done on approximately 2017 and the result was: Birads II (Benign findings)..    No Additional Complaints Reported    The following portions of the patient's history were reviewed and updated as appropriate:vital signs and   She  has a past medical history of Anxiety; Arthritis; Depression; Diabetes mellitus " (CMS/[facility]); Fibromyalgia, primary; GERD (gastroesophageal reflux disease); Hyperlipidemia; Hypertension; Hypothyroidism; IBS (irritable bowel syndrome); and New onset of headaches.  She  does not have any pertinent problems on file.  She  has a past surgical history that includes Cholecystectomy; Rhinoplasty; Appendectomy; Kidney stone surgery; and Breast excisional biopsy (Left).  Her family history includes Anxiety disorder in her brother; Arthritis in her other; Bleeding Disorder in her daughter; Cancer in her brother and father; Depression in her brother; Diabetes in her brother and mother; Heart failure in her brother; Hyperlipidemia in her other; Melanoma in her other; Obesity in her other; Pulmonary embolism in her other; Thyroid disease in her other.  She  reports that she has quit smoking. Her smoking use included Cigarettes. She has never used smokeless tobacco. She reports that she does not drink alcohol or use drugs.  Current Outpatient Prescriptions   Medication Sig Dispense Refill   • cholecalciferol (VITAMIN D3) 1000 units tablet Take 1,000 Units by mouth Daily.     • ALPRAZolam (XANAX) 1 MG tablet Take  by mouth.     • busPIRone (BUSPAR) 10 MG tablet Take  by mouth.     • dapagliflozin-metformin HCl ER (XIGDUO XR) 5-1000 MG tablet Take 1 tablet by mouth Daily. 1 PO BID 60 tablet 2   • desipramine (NOPRAMIN) 100 MG tablet Take  by mouth.     • DULoxetine (CYMBALTA) 60 MG capsule Take  by mouth.     • fenofibrate (TRICOR) 145 MG tablet Take 1 tablet by mouth Daily. 30 tablet 2   • glimepiride (AMARYL) 4 MG tablet Take 1 tablet by mouth Every Morning Before Breakfast. 30 tablet 1   • levothyroxine (SYNTHROID, LEVOTHROID) 25 MCG tablet Take 1 tablet by mouth Daily. 30 tablet 2   • Magnesium Oxide 400 (240 Mg) MG tablet Take 1 tablet by mouth Daily.     • mirtazapine (REMERON) 15 MG tablet Take 1 tablet by mouth Daily.  0   • ondansetron ODT (ZOFRAN ODT) 4 MG disintegrating tablet Take 1 tablet by mouth  "Every 8 (Eight) Hours As Needed for Nausea or Vomiting. 10 tablet 0   • pravastatin (PRAVACHOL) 40 MG tablet Take 1 tablet by mouth Daily. 30 tablet 2   • venlafaxine XR (EFFEXOR-XR) 37.5 MG 24 hr capsule Take 1 capsule by mouth Daily.  0   • venlafaxine XR (EFFEXOR-XR) 75 MG 24 hr capsule   0     No current facility-administered medications for this visit.      She is allergic to atorvastatin; simvastatin; trulicity  [dulaglutide]; and sulfa antibiotics..    Review of Systems  A comprehensive review of systems was taken.  Constitutional: negative for fever, chills, activity change, appetite change, fatigue and unexpected weight change.  Respiratory: negative  Cardiovascular: negative  Gastrointestinal: positive for leakage of stool  Genitourinary:positive for vulvar and vaginal dryness and irritation  Musculoskeletal:negative  Behavioral/Psych: negative       /70   Ht 157.5 cm (62\")   Wt 76.8 kg (169 lb 6.4 oz)   BMI 30.98 kg/m²     Physical Exam    General:  alert; cooperative; well developed; well nourished   Skin:  No suspicious lesions seen   Thyroid: normal to inspection and palpation   Lungs:  clear to auscultation bilaterally   Heart:  regular rate and rhythm, S1, S2 normal, no murmur, click, rub or gallop   Breasts:  Examined in supine position  Symmetric without masses or skin dimpling  Nipples normal without inversion, lesions or discharge  There are no palpable axillary nodes  Fibrocystic changes are present both breasts without a discrete mass   Abdomen: soft, non-tender; no masses  no umbilical or inginual hernias are present  no hepato-splenomegaly  Diastasis recti   Pelvis: Clinical staff was present for exam  External genitalia:  Severe atrophy  Vaginal:  atrophic mucosal changes are present;  Cervix:  stenotic; confluent with vault  Uterus:  normal size, shape and consistency. anteverted;  Adnexa:  non palpable bilaterally.  Rectal:  anus visually normal appearing. decreased sphincter tone "     Lab Review   No data reviewed    Imaging  Mammogram results- Birads II         ASSESSMENT  Problems Addressed this Visit        Genitourinary    Vaginal atrophy    RESOLVED: Menopause - Primary       Other    Fibrocystic breast changes, bilateral      Other Visit Diagnoses     Encounter for gynecological examination without abnormal finding        Relevant Orders    Liquid-based Pap Smear, Screening          PLAN    · Medications prescribed this encounter:  No orders of the defined types were placed in this encounter.  · Premarin vaginal cream. 0.5 grams three times a week, intravaginally  · Monthly self breast assessment, mammograms are indicated  · :Colon-Rectal evaluation  · Calcium, 600 mg/ Vit. D, 400 IU daily; regular weight-bearing exercise  · Follow up: 3 month(s)  *Please note that portions of this documentation may have been completed with a voice recognition program.  Efforts were made to edit this dictation, but occasional words may have been mistranscribed.       This note was electronically signed.    ALLAN Galvan MD  September 5, 2018  4:22 PM

## 2018-10-01 RX ORDER — FENOFIBRATE 145 MG/1
145 TABLET, COATED ORAL DAILY
Qty: 30 TABLET | Refills: 2 | OUTPATIENT
Start: 2018-10-01

## 2018-10-01 RX ORDER — GLIMEPIRIDE 4 MG/1
4 TABLET ORAL
Qty: 30 TABLET | Refills: 1 | OUTPATIENT
Start: 2018-10-01

## 2018-10-01 RX ORDER — PRAVASTATIN SODIUM 40 MG
40 TABLET ORAL DAILY
Qty: 30 TABLET | Refills: 2 | OUTPATIENT
Start: 2018-10-01

## 2018-10-10 RX ORDER — GLIMEPIRIDE 4 MG/1
4 TABLET ORAL
Qty: 30 TABLET | Refills: 0 | Status: SHIPPED | OUTPATIENT
Start: 2018-10-10 | End: 2018-10-17 | Stop reason: SDUPTHER

## 2018-10-10 NOTE — TELEPHONE ENCOUNTER
----- Message from Arnaldo Webster sent at 10/10/2018  3:10 PM EDT -----  Regarding: MED REFILL  Contact: [phone]  PT STATED SHE IS OUT OF glimepiride PT HAS MADE AN APPOINTMENT ON OCT 17 WED AT 1 30

## 2018-10-17 ENCOUNTER — OFFICE VISIT (OUTPATIENT)
Dept: FAMILY MEDICINE CLINIC | Facility: CLINIC | Age: 58
End: 2018-10-17

## 2018-10-17 VITALS
WEIGHT: 164 LBS | HEART RATE: 80 BPM | BODY MASS INDEX: 30.18 KG/M2 | HEIGHT: 62 IN | OXYGEN SATURATION: 98 % | SYSTOLIC BLOOD PRESSURE: 126 MMHG | RESPIRATION RATE: 16 BRPM | DIASTOLIC BLOOD PRESSURE: 82 MMHG

## 2018-10-17 DIAGNOSIS — Z12.31 ENCOUNTER FOR SCREENING MAMMOGRAM FOR BREAST CANCER: ICD-10-CM

## 2018-10-17 DIAGNOSIS — E78.2 MIXED HYPERLIPIDEMIA: ICD-10-CM

## 2018-10-17 DIAGNOSIS — E03.9 ACQUIRED HYPOTHYROIDISM: ICD-10-CM

## 2018-10-17 DIAGNOSIS — E11.9 TYPE 2 DIABETES MELLITUS WITHOUT COMPLICATION, WITHOUT LONG-TERM CURRENT USE OF INSULIN (HCC): Primary | ICD-10-CM

## 2018-10-17 LAB
ALBUMIN SERPL-MCNC: 4.76 G/DL (ref 3.2–4.8)
ALBUMIN/GLOB SERPL: 1.8 G/DL (ref 1.5–2.5)
ALP SERPL-CCNC: 69 U/L (ref 25–100)
ALT SERPL W P-5'-P-CCNC: 39 U/L (ref 7–40)
ANION GAP SERPL CALCULATED.3IONS-SCNC: 12 MMOL/L (ref 3–11)
ARTICHOKE IGE QN: 114 MG/DL (ref 0–130)
AST SERPL-CCNC: 43 U/L (ref 0–33)
BASOPHILS # BLD AUTO: 0.03 10*3/MM3 (ref 0–0.2)
BASOPHILS NFR BLD AUTO: 0.4 % (ref 0–1)
BILIRUB SERPL-MCNC: 0.4 MG/DL (ref 0.3–1.2)
BUN BLD-MCNC: 17 MG/DL (ref 9–23)
BUN/CREAT SERPL: 22.7 (ref 7–25)
CALCIUM SPEC-SCNC: 9.4 MG/DL (ref 8.7–10.4)
CHLORIDE SERPL-SCNC: 100 MMOL/L (ref 99–109)
CHOLEST SERPL-MCNC: 200 MG/DL (ref 0–200)
CO2 SERPL-SCNC: 25 MMOL/L (ref 20–31)
CREAT BLD-MCNC: 0.75 MG/DL (ref 0.6–1.3)
DEPRECATED RDW RBC AUTO: 45.3 FL (ref 37–54)
EOSINOPHIL # BLD AUTO: 0.07 10*3/MM3 (ref 0–0.3)
EOSINOPHIL NFR BLD AUTO: 0.9 % (ref 0–3)
ERYTHROCYTE [DISTWIDTH] IN BLOOD BY AUTOMATED COUNT: 15 % (ref 11.3–14.5)
GFR SERPL CREATININE-BSD FRML MDRD: 79 ML/MIN/1.73
GLOBULIN UR ELPH-MCNC: 2.6 GM/DL
GLUCOSE BLD-MCNC: 177 MG/DL (ref 70–100)
HBA1C MFR BLD: 11.4 %
HCT VFR BLD AUTO: 43.7 % (ref 34.5–44)
HDLC SERPL-MCNC: 38 MG/DL (ref 40–60)
HGB BLD-MCNC: 13.5 G/DL (ref 11.5–15.5)
IMM GRANULOCYTES # BLD: 0.02 10*3/MM3 (ref 0–0.03)
IMM GRANULOCYTES NFR BLD: 0.2 % (ref 0–0.6)
LYMPHOCYTES # BLD AUTO: 2.56 10*3/MM3 (ref 0.6–4.8)
LYMPHOCYTES NFR BLD AUTO: 31.3 % (ref 24–44)
MCH RBC QN AUTO: 25.8 PG (ref 27–31)
MCHC RBC AUTO-ENTMCNC: 30.9 G/DL (ref 32–36)
MCV RBC AUTO: 83.4 FL (ref 80–99)
MONOCYTES # BLD AUTO: 0.47 10*3/MM3 (ref 0–1)
MONOCYTES NFR BLD AUTO: 5.7 % (ref 0–12)
NEUTROPHILS # BLD AUTO: 5.06 10*3/MM3 (ref 1.5–8.3)
NEUTROPHILS NFR BLD AUTO: 61.7 % (ref 41–71)
PLATELET # BLD AUTO: 296 10*3/MM3 (ref 150–450)
PMV BLD AUTO: 11.1 FL (ref 6–12)
POTASSIUM BLD-SCNC: 4.3 MMOL/L (ref 3.5–5.5)
PROT SERPL-MCNC: 7.4 G/DL (ref 5.7–8.2)
RBC # BLD AUTO: 5.24 10*6/MM3 (ref 3.89–5.14)
SODIUM BLD-SCNC: 137 MMOL/L (ref 132–146)
TRIGL SERPL-MCNC: 675 MG/DL (ref 0–150)
WBC NRBC COR # BLD: 8.19 10*3/MM3 (ref 3.5–10.8)

## 2018-10-17 PROCEDURE — 80061 LIPID PANEL: CPT | Performed by: FAMILY MEDICINE

## 2018-10-17 PROCEDURE — 80053 COMPREHEN METABOLIC PANEL: CPT | Performed by: FAMILY MEDICINE

## 2018-10-17 PROCEDURE — 85025 COMPLETE CBC W/AUTO DIFF WBC: CPT | Performed by: FAMILY MEDICINE

## 2018-10-17 PROCEDURE — 83036 HEMOGLOBIN GLYCOSYLATED A1C: CPT | Performed by: FAMILY MEDICINE

## 2018-10-17 PROCEDURE — 99214 OFFICE O/P EST MOD 30 MIN: CPT | Performed by: FAMILY MEDICINE

## 2018-10-17 PROCEDURE — 36415 COLL VENOUS BLD VENIPUNCTURE: CPT | Performed by: FAMILY MEDICINE

## 2018-10-17 RX ORDER — SERTRALINE HYDROCHLORIDE 100 MG/1
100 TABLET, FILM COATED ORAL EVERY MORNING
Refills: 0 | COMMUNITY
Start: 2018-10-10 | End: 2021-07-12

## 2018-10-17 RX ORDER — ARIPIPRAZOLE 5 MG/1
TABLET ORAL
Refills: 0 | COMMUNITY
Start: 2018-10-10 | End: 2021-07-12

## 2018-10-17 RX ORDER — FLUTICASONE PROPIONATE 50 MCG
2 SPRAY, SUSPENSION (ML) NASAL DAILY
Qty: 1 BOTTLE | Refills: 3 | Status: SHIPPED | OUTPATIENT
Start: 2018-10-17 | End: 2021-09-20

## 2018-10-17 RX ORDER — LEVOTHYROXINE SODIUM 0.03 MG/1
25 TABLET ORAL DAILY
Qty: 30 TABLET | Refills: 2 | Status: SHIPPED | OUTPATIENT
Start: 2018-10-17 | End: 2019-01-15 | Stop reason: SDUPTHER

## 2018-10-17 RX ORDER — PRAVASTATIN SODIUM 40 MG
40 TABLET ORAL DAILY
Qty: 30 TABLET | Refills: 2 | Status: SHIPPED | OUTPATIENT
Start: 2018-10-17 | End: 2019-01-15 | Stop reason: SDUPTHER

## 2018-10-17 RX ORDER — FENOFIBRATE 145 MG/1
145 TABLET, COATED ORAL DAILY
Qty: 30 TABLET | Refills: 2 | Status: SHIPPED | OUTPATIENT
Start: 2018-10-17 | End: 2019-01-15 | Stop reason: SDUPTHER

## 2018-10-17 RX ORDER — MIRTAZAPINE 30 MG/1
15 TABLET, FILM COATED ORAL
Refills: 0 | COMMUNITY
Start: 2018-09-07 | End: 2021-07-12

## 2018-10-17 RX ORDER — GLIMEPIRIDE 4 MG/1
4 TABLET ORAL
Qty: 30 TABLET | Refills: 1 | Status: SHIPPED | OUTPATIENT
Start: 2018-10-17 | End: 2019-01-02 | Stop reason: SDUPTHER

## 2018-10-17 NOTE — PROGRESS NOTES
Subjective   Christi Chapa is a 57 y.o. female.     Diabetes   She presents for her follow-up (Uncontrolled; addicted to sugar; has not seen endo; DNT Victoza) diabetic visit. She has type 2 diabetes mellitus. Her disease course has been fluctuating. Pertinent negatives for hypoglycemia include no nervousness/anxiousness. Associated symptoms include foot paresthesias. Pertinent negatives for diabetes include no blurred vision, no chest pain, no foot ulcerations, no polydipsia, no polyphagia and no polyuria. There are no hypoglycemic complications. Symptoms are stable. There are no diabetic complications. Risk factors for coronary artery disease include diabetes mellitus, dyslipidemia, hypertension, family history, sedentary lifestyle and post-menopausal. Current diabetic treatment includes oral agent (triple therapy). She is compliant with treatment most of the time. Her weight is decreasing steadily. She is following a diabetic and generally unhealthy diet. When asked about meal planning, she reported none. She has had a previous visit with a dietitian. She participates in exercise intermittently. Her home blood glucose trend is decreasing steadily. An ACE inhibitor/angiotensin II receptor blocker is being taken. She sees a podiatrist.Eye exam is current.   Hyperlipidemia   This is a chronic problem. The current episode started more than 1 year ago. The problem is uncontrolled. Recent lipid tests were reviewed and are high. Exacerbating diseases include hypothyroidism and obesity. There are no known factors aggravating her hyperlipidemia. Pertinent negatives include no chest pain, leg pain or shortness of breath. Current antihyperlipidemic treatment includes fibric acid derivatives. The current treatment provides mild improvement of lipids. Compliance problems include adherence to diet and medication side effects.  Risk factors for coronary artery disease include diabetes mellitus, dyslipidemia, obesity,  hypertension, post-menopausal and a sedentary lifestyle.   Hypothyroidism   This is a chronic problem. The current episode started more than 1 year ago. The problem occurs constantly. The problem has been unchanged. Pertinent negatives include no abdominal pain, chest pain, coughing, nausea or rash. Nothing aggravates the symptoms. Treatments tried: on Synthroid. The treatment provided significant relief.   Follows with Psych. Abilify has been added. Effexor has smita d/pola and started Zoloft.  Pt is doing a ketogenic diet and increased fats. Pt also admits to high sugar diet.    The following portions of the patient's history were reviewed and updated as appropriate: allergies, current medications, past family history, past medical history, past social history, past surgical history and problem list.    Review of Systems   Constitutional: Negative.    HENT: Negative.    Eyes: Negative.  Negative for blurred vision, double vision and visual disturbance.   Respiratory: Negative.  Negative for cough, chest tightness, shortness of breath and wheezing.    Cardiovascular: Negative for chest pain.   Gastrointestinal: Negative.  Negative for abdominal distention, abdominal pain, blood in stool, constipation, diarrhea and nausea.   Endocrine: Negative.  Negative for cold intolerance, heat intolerance, polydipsia, polyphagia and polyuria.   Genitourinary: Negative.  Negative for urinary incontinence, dysuria, frequency and urgency.   Musculoskeletal: Negative.    Skin: Negative.  Negative for rash.   Allergic/Immunologic: Negative.    Neurological: Negative.    Hematological: Negative.  Negative for adenopathy.   Psychiatric/Behavioral: Negative.  Negative for suicidal ideas and depressed mood. The patient is not nervous/anxious.    All other systems reviewed and are negative.      Objective   Physical Exam   Constitutional: She is oriented to person, place, and time. She appears well-developed and well-nourished.   HENT:    Head: Normocephalic.   Right Ear: External ear normal.   Left Ear: External ear normal.   Nose: Nose normal.   Mouth/Throat: Oropharynx is clear and moist. No oropharyngeal exudate.   Eyes: Pupils are equal, round, and reactive to light. Conjunctivae and EOM are normal.   Neck: Normal range of motion. Neck supple. No thyromegaly present.   Cardiovascular: Normal rate, regular rhythm, normal heart sounds and intact distal pulses.    No murmur heard.  Pulmonary/Chest: Effort normal and breath sounds normal. No respiratory distress. She exhibits no tenderness.   Abdominal: Soft. Bowel sounds are normal. She exhibits no distension and no mass. There is no tenderness. There is no rebound and no guarding.   Musculoskeletal: Normal range of motion.   Lymphadenopathy:     She has no cervical adenopathy.   Neurological: She is alert and oriented to person, place, and time. She has normal reflexes. She displays normal reflexes. She exhibits normal muscle tone. Coordination normal.   Skin: Skin is warm and dry. No rash noted. She is not diaphoretic. No erythema.   Psychiatric: She has a normal mood and affect. Her behavior is normal. Judgment and thought content normal.   Nursing note and vitals reviewed.        Assessment/Plan   Christi was seen today for diabetes, hyperlipidemia and hypothyroidism.    Diagnoses and all orders for this visit:    Type 2 diabetes mellitus without complication, without long-term current use of insulin (CMS/[facility])  -     POC Glycosylated Hemoglobin (Hb A1C)  -     CBC & Differential  -     Comprehensive Metabolic Panel  -     Ambulatory Referral to Endocrinology  -     Ambulatory Referral to Ophthalmology  -     CBC Auto Differential    Mixed hyperlipidemia  -     Lipid Panel    Acquired hypothyroidism    Encounter for screening mammogram for breast cancer  -     Mammo Screening Digital Tomosynthesis Bilateral With CAD; Future    Other orders  -     dapagliflozin-metformin HCl ER (XIGDUO XR) 5-1000  MG tablet; Take 1 tablet by mouth Daily. 1 PO BID  -     glimepiride (AMARYL) 4 MG tablet; Take 1 tablet by mouth Every Morning Before Breakfast.  -     fenofibrate (TRICOR) 145 MG tablet; Take 1 tablet by mouth Daily.  -     levothyroxine (SYNTHROID, LEVOTHROID) 25 MCG tablet; Take 1 tablet by mouth Daily.  -     pravastatin (PRAVACHOL) 40 MG tablet; Take 1 tablet by mouth Daily.  -     Insulin Glargine (LANTUS SOLOSTAR) 100 UNIT/ML injection pen; Inject 20 Units under the skin into the appropriate area as directed Every Night.  -     fluticasone (FLONASE) 50 MCG/ACT nasal spray; 2 sprays into the nostril(s) as directed by provider Daily.        Add Lantus 20 mg qhs  Today I have spent a total of 25 minutes face to face with Christi Chapa.  During that time, a total of 15 minutes was spent counseling on diabetes,, hyperlipidemia, hypothyroidism. I discussed in detail with the patient today regarding diet and nutrition. I discussed a ketogenic diet is not sustainable and does not help her underlying medical conditions. She states that she is not to change her diet or quit meat, dairy, sugar. I have added Lantus today. I have also instructed also instructed her that due to the complexity of her noncompliance and diabetes uncontrolled  that she will be better served by endocrinology and dietary guidance.

## 2018-11-14 ENCOUNTER — OFFICE VISIT (OUTPATIENT)
Dept: FAMILY MEDICINE CLINIC | Facility: CLINIC | Age: 58
End: 2018-11-14

## 2018-11-14 VITALS
WEIGHT: 155 LBS | DIASTOLIC BLOOD PRESSURE: 84 MMHG | HEART RATE: 84 BPM | HEIGHT: 62 IN | OXYGEN SATURATION: 98 % | BODY MASS INDEX: 28.52 KG/M2 | SYSTOLIC BLOOD PRESSURE: 124 MMHG | RESPIRATION RATE: 16 BRPM

## 2018-11-14 DIAGNOSIS — E78.2 MIXED HYPERLIPIDEMIA: ICD-10-CM

## 2018-11-14 DIAGNOSIS — E03.9 ACQUIRED HYPOTHYROIDISM: ICD-10-CM

## 2018-11-14 DIAGNOSIS — E11.65 UNCONTROLLED TYPE 2 DIABETES MELLITUS WITH HYPERGLYCEMIA (HCC): Primary | ICD-10-CM

## 2018-11-14 LAB — HBA1C MFR BLD: 9.1 %

## 2018-11-14 PROCEDURE — 99214 OFFICE O/P EST MOD 30 MIN: CPT | Performed by: FAMILY MEDICINE

## 2018-11-14 PROCEDURE — 83036 HEMOGLOBIN GLYCOSYLATED A1C: CPT | Performed by: FAMILY MEDICINE

## 2018-11-14 RX ORDER — LANCETS 33 GAUGE
EACH MISCELLANEOUS
COMMUNITY
Start: 2018-10-18 | End: 2021-09-20 | Stop reason: SDUPTHER

## 2018-11-14 RX ORDER — BLOOD-GLUCOSE METER
KIT MISCELLANEOUS
COMMUNITY
Start: 2018-10-18 | End: 2022-04-28

## 2018-11-14 NOTE — PROGRESS NOTES
Subjective   Christi Chapa is a 57 y.o. female.   Doing well. Losing weight and has cut sugars. Taking xigduo and glimeperide.  Diabetes   She presents for her follow-up (Uncontrolled; addicted to sugar; has not seen endo; DNT Victoza) diabetic visit. She has type 2 diabetes mellitus. Her disease course has been fluctuating. Pertinent negatives for hypoglycemia include no dizziness or nervousness/anxiousness. Associated symptoms include foot paresthesias. Pertinent negatives for diabetes include no blurred vision, no chest pain, no fatigue, no foot ulcerations, no polydipsia, no polyphagia, no polyuria and no weight loss. There are no hypoglycemic complications. Symptoms are stable. There are no diabetic complications. Risk factors for coronary artery disease include diabetes mellitus, dyslipidemia, hypertension, family history, sedentary lifestyle and post-menopausal. Current diabetic treatment includes oral agent (dual therapy). She is compliant with treatment most of the time. Her weight is decreasing steadily. She is following a diabetic and generally unhealthy diet. When asked about meal planning, she reported none. She has had a previous visit with a dietitian. She participates in exercise intermittently. Her home blood glucose trend is decreasing steadily. An ACE inhibitor/angiotensin II receptor blocker is being taken. She sees a podiatrist.Eye exam is current.   Hyperlipidemia   This is a chronic problem. The current episode started more than 1 year ago. The problem is uncontrolled. Recent lipid tests were reviewed and are high. Exacerbating diseases include hypothyroidism and obesity. There are no known factors aggravating her hyperlipidemia. Pertinent negatives include no chest pain, leg pain, myalgias or shortness of breath. Current antihyperlipidemic treatment includes fibric acid derivatives. The current treatment provides mild improvement of lipids. Compliance problems include adherence to diet and  medication side effects.  Risk factors for coronary artery disease include diabetes mellitus, dyslipidemia, obesity, hypertension, post-menopausal and a sedentary lifestyle.   Hypothyroidism   This is a chronic problem. The current episode started more than 1 year ago. The problem occurs constantly. The problem has been unchanged. Pertinent negatives include no abdominal pain, arthralgias, chest pain, congestion, coughing, fatigue, fever, myalgias, nausea, numbness, rash or sore throat. Nothing aggravates the symptoms. Treatments tried: on Synthroid. The treatment provided significant relief.          The following portions of the patient's history were reviewed and updated as appropriate: allergies, current medications, past family history, past medical history, past social history, past surgical history and problem list.    Review of Systems   Constitutional: Negative.  Negative for activity change, fatigue, fever, unexpected weight gain and unexpected weight loss.   HENT: Negative.  Negative for congestion, sneezing and sore throat.    Eyes: Negative.  Negative for blurred vision, double vision and visual disturbance.   Respiratory: Negative.  Negative for cough, chest tightness, shortness of breath and wheezing.    Cardiovascular: Negative.  Negative for chest pain, palpitations and leg swelling.   Gastrointestinal: Negative.  Negative for abdominal distention, abdominal pain, blood in stool, constipation, diarrhea and nausea.   Endocrine: Negative.  Negative for cold intolerance, heat intolerance, polydipsia, polyphagia and polyuria.   Genitourinary: Negative.  Negative for urinary incontinence, dysuria, frequency and urgency.   Musculoskeletal: Negative.  Negative for arthralgias and myalgias.   Skin: Negative.  Negative for rash.   Allergic/Immunologic: Negative.    Neurological: Negative.  Negative for dizziness, syncope, numbness and memory problem.   Hematological: Negative.  Negative for adenopathy.    Psychiatric/Behavioral: Negative.  Negative for suicidal ideas and depressed mood. The patient is not nervous/anxious.    All other systems reviewed and are negative.      Objective   Physical Exam   Constitutional: She is oriented to person, place, and time. She appears well-developed and well-nourished.   HENT:   Head: Normocephalic.   Right Ear: External ear normal.   Left Ear: External ear normal.   Nose: Nose normal.   Mouth/Throat: Oropharynx is clear and moist. No oropharyngeal exudate.   Eyes: Conjunctivae and EOM are normal. Pupils are equal, round, and reactive to light.   Neck: Normal range of motion. Neck supple. No thyromegaly present.   Cardiovascular: Normal rate, regular rhythm, normal heart sounds and intact distal pulses.   No murmur heard.  Pulmonary/Chest: Effort normal and breath sounds normal. No respiratory distress. She exhibits no tenderness.   Abdominal: Soft. Bowel sounds are normal. She exhibits no distension and no mass. There is no tenderness. There is no rebound and no guarding.   Musculoskeletal: Normal range of motion.    Christi had a diabetic foot exam performed today.   During the foot exam she had a monofilament test performed.    Neurological Sensory Findings - Unaltered hot/cold right ankle/foot discrimination.  Vascular Status -  Her right foot exhibits normal foot vasculature  and no edema. Her left foot exhibits normal foot vasculature  and no edema.  Skin Integrity  -  Her right foot skin is intact.Her left foot skin is intact..  Lymphadenopathy:     She has no cervical adenopathy.   Neurological: She is alert and oriented to person, place, and time. She has normal reflexes. She displays normal reflexes. She exhibits normal muscle tone. Coordination normal.   Skin: Skin is warm and dry. No rash noted. She is not diaphoretic. No erythema.   Psychiatric: She has a normal mood and affect. Her behavior is normal. Judgment and thought content normal.   Nursing note and vitals  reviewed.        Assessment/Plan   Christi was seen today for diabetes.    Diagnoses and all orders for this visit:    Uncontrolled type 2 diabetes mellitus with hyperglycemia (CMS/[facility])  -     POC Glycosylated Hemoglobin (Hb A1C)    Mixed hyperlipidemia    Acquired hypothyroidism    Today I have spent a total of 25 minutes face to face with Christi Chapa.  During that time, a total of 15 minutes was spent counseling on diabetes, hyperlipidemia, hypothyroidism. Discussed medications and side effects. Discussed compliance with follow-up. Patient will continue her current diabetic medications. Discussed in detail diabetic nutrition.

## 2018-12-07 ENCOUNTER — HOSPITAL ENCOUNTER (OUTPATIENT)
Dept: MAMMOGRAPHY | Facility: HOSPITAL | Age: 58
Discharge: HOME | End: 2018-12-07
Attending: FAMILY MEDICINE | Admitting: FAMILY MEDICINE

## 2018-12-07 DIAGNOSIS — Z12.31 ENCOUNTER FOR SCREENING MAMMOGRAM FOR BREAST CANCER: ICD-10-CM

## 2018-12-07 PROCEDURE — 77063 BREAST TOMOSYNTHESIS BI: CPT

## 2018-12-07 PROCEDURE — 77067 SCR MAMMO BI INCL CAD: CPT | Performed by: RADIOLOGY

## 2018-12-07 PROCEDURE — 77063 BREAST TOMOSYNTHESIS BI: CPT | Performed by: RADIOLOGY

## 2018-12-07 PROCEDURE — 77067 SCR MAMMO BI INCL CAD: CPT

## 2019-01-02 RX ORDER — GLIMEPIRIDE 4 MG/1
TABLET ORAL
Qty: 30 TABLET | Refills: 1 | Status: SHIPPED | OUTPATIENT
Start: 2019-01-02 | End: 2019-02-15 | Stop reason: SDUPTHER

## 2019-01-14 RX ORDER — DAPAGLIFLOZIN AND METFORMIN HYDROCHLORIDE 5; 1000 MG/1; MG/1
TABLET, FILM COATED, EXTENDED RELEASE ORAL
Qty: 60 TABLET | Refills: 0 | Status: SHIPPED | OUTPATIENT
Start: 2019-01-14 | End: 2019-01-25

## 2019-01-16 RX ORDER — LEVOTHYROXINE SODIUM 0.03 MG/1
25 TABLET ORAL DAILY
Qty: 30 TABLET | Refills: 2 | Status: SHIPPED | OUTPATIENT
Start: 2019-01-16 | End: 2019-02-15 | Stop reason: SDUPTHER

## 2019-01-16 RX ORDER — FENOFIBRATE 145 MG/1
145 TABLET, COATED ORAL DAILY
Qty: 30 TABLET | Refills: 2 | Status: SHIPPED | OUTPATIENT
Start: 2019-01-16 | End: 2019-02-15 | Stop reason: SDUPTHER

## 2019-01-16 RX ORDER — PRAVASTATIN SODIUM 40 MG
40 TABLET ORAL DAILY
Qty: 30 TABLET | Refills: 2 | Status: SHIPPED | OUTPATIENT
Start: 2019-01-16 | End: 2019-02-15 | Stop reason: SDUPTHER

## 2019-01-25 ENCOUNTER — TELEPHONE (OUTPATIENT)
Dept: FAMILY MEDICINE CLINIC | Facility: CLINIC | Age: 59
End: 2019-01-25

## 2019-01-25 NOTE — TELEPHONE ENCOUNTER
----- Message from Rhonda Gunn DO sent at 1/25/2019 11:30 AM EST -----  Contact: [phone]  I sent in Synjardy generic 5/1000 twice a day  ----- Message -----  From: Amaris Underwood MA  Sent: 1/25/2019  10:57 AM  To: Rhonda Gunn DO        ----- Message -----  From: Nadia Payne MA  Sent: 1/25/2019  10:44 AM  To: Amaris Underwood MA    Just got off the phone with insurance and they said that they don't have a list of covered meds, but we can try to send in either Jardiance or synjardy but they may also need a PA  ----- Message -----  From: Amaris Underwood MA  Sent: 1/25/2019  10:03 AM  To: ARCHIE Pappas ,    when they sent the paper over did they put what they will cover ?   ----- Message -----  From: Rhonda Gunn DO  Sent: 1/25/2019   9:36 AM  To: Amaris Underwood MA    I need to know what they cover  ----- Message -----  From: Amaris Underwood MA  Sent: 1/25/2019   7:26 AM  To: Rhonda Gunn DO        ----- Message -----  From: Nadia Payne MA  Sent: 1/24/2019  11:54 AM  To: Amaris Underwood MA    Pt's insurance denied coverage on pt's diabetic med.  Would you like to change to something else or do an appeal on med, Xigduo 5/1000.  Please advise

## 2019-01-30 ENCOUNTER — TELEPHONE (OUTPATIENT)
Dept: FAMILY MEDICINE CLINIC | Facility: CLINIC | Age: 59
End: 2019-01-30

## 2019-01-30 NOTE — TELEPHONE ENCOUNTER
----- Message from Sameera Coronado sent at 1/30/2019 11:52 AM EST -----  Regarding: PLEASE CALL [facility]   Contact: [phone]  PLEASE CALL REGARDING PA FOR SYNJARDY XR- 5MG        PA was done and approved for the Synjardy 5/1000 mg.  Pharmacy and pt notified.  MARKY Washington

## 2019-02-15 ENCOUNTER — OFFICE VISIT (OUTPATIENT)
Dept: FAMILY MEDICINE CLINIC | Facility: CLINIC | Age: 59
End: 2019-02-15

## 2019-02-15 VITALS
OXYGEN SATURATION: 98 % | TEMPERATURE: 97.1 F | WEIGHT: 160 LBS | DIASTOLIC BLOOD PRESSURE: 70 MMHG | HEART RATE: 89 BPM | SYSTOLIC BLOOD PRESSURE: 140 MMHG | BODY MASS INDEX: 29.26 KG/M2 | RESPIRATION RATE: 20 BRPM

## 2019-02-15 DIAGNOSIS — E11.9 TYPE 2 DIABETES MELLITUS WITHOUT COMPLICATION, UNSPECIFIED WHETHER LONG TERM INSULIN USE (HCC): Primary | ICD-10-CM

## 2019-02-15 DIAGNOSIS — E78.2 MIXED HYPERLIPIDEMIA: ICD-10-CM

## 2019-02-15 DIAGNOSIS — M79.7 FIBROMYALGIA: ICD-10-CM

## 2019-02-15 DIAGNOSIS — E03.9 ACQUIRED HYPOTHYROIDISM: ICD-10-CM

## 2019-02-15 LAB
ALBUMIN SERPL-MCNC: 4.81 G/DL (ref 3.2–4.8)
ALBUMIN/GLOB SERPL: 2 G/DL (ref 1.5–2.5)
ALP SERPL-CCNC: 58 U/L (ref 25–100)
ALT SERPL W P-5'-P-CCNC: 31 U/L (ref 7–40)
ANION GAP SERPL CALCULATED.3IONS-SCNC: 8 MMOL/L (ref 3–11)
ARTICHOKE IGE QN: 175 MG/DL (ref 0–130)
AST SERPL-CCNC: 28 U/L (ref 0–33)
BASOPHILS # BLD AUTO: 0.04 10*3/MM3 (ref 0–0.2)
BASOPHILS NFR BLD AUTO: 0.5 % (ref 0–1)
BILIRUB SERPL-MCNC: 0.4 MG/DL (ref 0.3–1.2)
BUN BLD-MCNC: 21 MG/DL (ref 9–23)
BUN/CREAT SERPL: 28 (ref 7–25)
CALCIUM SPEC-SCNC: 10.1 MG/DL (ref 8.7–10.4)
CHLORIDE SERPL-SCNC: 101 MMOL/L (ref 99–109)
CHOLEST SERPL-MCNC: 229 MG/DL (ref 0–200)
CO2 SERPL-SCNC: 27 MMOL/L (ref 20–31)
CREAT BLD-MCNC: 0.75 MG/DL (ref 0.6–1.3)
DEPRECATED RDW RBC AUTO: 47.7 FL (ref 37–54)
EOSINOPHIL # BLD AUTO: 0.14 10*3/MM3 (ref 0–0.3)
EOSINOPHIL NFR BLD AUTO: 1.9 % (ref 0–3)
ERYTHROCYTE [DISTWIDTH] IN BLOOD BY AUTOMATED COUNT: 16.8 % (ref 11.3–14.5)
GFR SERPL CREATININE-BSD FRML MDRD: 79 ML/MIN/1.73
GLOBULIN UR ELPH-MCNC: 2.4 GM/DL
GLUCOSE BLD-MCNC: 141 MG/DL (ref 70–100)
HBA1C MFR BLD: 8.6 %
HCT VFR BLD AUTO: 42.2 % (ref 34.5–44)
HDLC SERPL-MCNC: 45 MG/DL (ref 40–60)
HGB BLD-MCNC: 12.3 G/DL (ref 11.5–15.5)
IMM GRANULOCYTES # BLD AUTO: 0.02 10*3/MM3 (ref 0–0.05)
IMM GRANULOCYTES NFR BLD AUTO: 0.3 % (ref 0–0.6)
LYMPHOCYTES # BLD AUTO: 2.46 10*3/MM3 (ref 0.6–4.8)
LYMPHOCYTES NFR BLD AUTO: 33.7 % (ref 24–44)
MCH RBC QN AUTO: 22.7 PG (ref 27–31)
MCHC RBC AUTO-ENTMCNC: 29.1 G/DL (ref 32–36)
MCV RBC AUTO: 78 FL (ref 80–99)
MONOCYTES # BLD AUTO: 0.49 10*3/MM3 (ref 0–1)
MONOCYTES NFR BLD AUTO: 6.7 % (ref 0–12)
NEUTROPHILS # BLD AUTO: 4.18 10*3/MM3 (ref 1.5–8.3)
NEUTROPHILS NFR BLD AUTO: 57.2 % (ref 41–71)
PLATELET # BLD AUTO: 330 10*3/MM3 (ref 150–450)
PMV BLD AUTO: 10.5 FL (ref 6–12)
POTASSIUM BLD-SCNC: 4.8 MMOL/L (ref 3.5–5.5)
PROT SERPL-MCNC: 7.2 G/DL (ref 5.7–8.2)
RBC # BLD AUTO: 5.41 10*6/MM3 (ref 3.89–5.14)
SODIUM BLD-SCNC: 136 MMOL/L (ref 132–146)
TRIGL SERPL-MCNC: 268 MG/DL (ref 0–150)
TSH SERPL DL<=0.05 MIU/L-ACNC: 0.58 MIU/ML (ref 0.35–5.35)
WBC NRBC COR # BLD: 7.31 10*3/MM3 (ref 3.5–10.8)

## 2019-02-15 PROCEDURE — 80053 COMPREHEN METABOLIC PANEL: CPT | Performed by: FAMILY MEDICINE

## 2019-02-15 PROCEDURE — 36415 COLL VENOUS BLD VENIPUNCTURE: CPT | Performed by: FAMILY MEDICINE

## 2019-02-15 PROCEDURE — 99214 OFFICE O/P EST MOD 30 MIN: CPT | Performed by: FAMILY MEDICINE

## 2019-02-15 PROCEDURE — 84443 ASSAY THYROID STIM HORMONE: CPT | Performed by: FAMILY MEDICINE

## 2019-02-15 PROCEDURE — 83036 HEMOGLOBIN GLYCOSYLATED A1C: CPT | Performed by: FAMILY MEDICINE

## 2019-02-15 PROCEDURE — 80061 LIPID PANEL: CPT | Performed by: FAMILY MEDICINE

## 2019-02-15 PROCEDURE — 85025 COMPLETE CBC W/AUTO DIFF WBC: CPT | Performed by: FAMILY MEDICINE

## 2019-02-15 RX ORDER — PRAVASTATIN SODIUM 40 MG
40 TABLET ORAL DAILY
Qty: 30 TABLET | Refills: 3 | Status: SHIPPED | OUTPATIENT
Start: 2019-02-15 | End: 2021-08-10

## 2019-02-15 RX ORDER — FENOFIBRATE 145 MG/1
145 TABLET, COATED ORAL DAILY
Qty: 30 TABLET | Refills: 3 | Status: SHIPPED | OUTPATIENT
Start: 2019-02-15 | End: 2021-07-12 | Stop reason: SDUPTHER

## 2019-02-15 RX ORDER — LEVOTHYROXINE SODIUM 0.03 MG/1
25 TABLET ORAL DAILY
Qty: 30 TABLET | Refills: 3 | Status: SHIPPED | OUTPATIENT
Start: 2019-02-15 | End: 2021-08-30 | Stop reason: SDUPTHER

## 2019-02-15 RX ORDER — GLIMEPIRIDE 4 MG/1
4 TABLET ORAL
Qty: 30 TABLET | Refills: 3 | Status: SHIPPED | OUTPATIENT
Start: 2019-02-15 | End: 2021-09-20 | Stop reason: SDUPTHER

## 2019-02-15 NOTE — PROGRESS NOTES
Subjective   Christi Chapa is a 58 y.o. female.     Diabetes   She presents for her follow-up (BS still elevated some. Has been taking synjardy qd, glimepiride) diabetic visit. She has type 2 diabetes mellitus. Her disease course has been fluctuating. Pertinent negatives for hypoglycemia include no dizziness or nervousness/anxiousness. Associated symptoms include foot paresthesias. Pertinent negatives for diabetes include no blurred vision, no chest pain, no fatigue, no foot ulcerations, no polydipsia, no polyphagia, no polyuria and no weight loss. There are no hypoglycemic complications. Symptoms are stable. There are no diabetic complications. Risk factors for coronary artery disease include diabetes mellitus, dyslipidemia, hypertension, family history, sedentary lifestyle and post-menopausal. Current diabetic treatment includes oral agent (dual therapy). She is compliant with treatment most of the time. Her weight is decreasing steadily. She is following a diabetic and generally unhealthy diet. When asked about meal planning, she reported none. She has had a previous visit with a dietitian. She participates in exercise intermittently. Her home blood glucose trend is decreasing steadily. An ACE inhibitor/angiotensin II receptor blocker is being taken. She sees a podiatrist.Eye exam is current.   Hyperlipidemia   This is a chronic problem. The current episode started more than 1 year ago. The problem is uncontrolled. Recent lipid tests were reviewed and are high. Exacerbating diseases include hypothyroidism and obesity. There are no known factors aggravating her hyperlipidemia. Pertinent negatives include no chest pain, leg pain, myalgias or shortness of breath. Current antihyperlipidemic treatment includes fibric acid derivatives. The current treatment provides mild improvement of lipids. Compliance problems include adherence to diet and medication side effects.  Risk factors for coronary artery disease include  diabetes mellitus, dyslipidemia, obesity, hypertension, post-menopausal and a sedentary lifestyle.   Hypothyroidism   This is a chronic problem. The current episode started more than 1 year ago. The problem occurs constantly. The problem has been unchanged. Pertinent negatives include no abdominal pain, arthralgias, chest pain, congestion, coughing, fatigue, fever, myalgias, nausea, numbness, rash or sore throat. Nothing aggravates the symptoms. Treatments tried: on Synthroid. The treatment provided significant relief.   Fibromyalgia   This is a chronic (Takes Cymbalta) problem. The current episode started more than 1 year ago. The problem occurs intermittently. The problem has been unchanged. Pertinent negatives include no abdominal pain, arthralgias, chest pain, congestion, coughing, fatigue, fever, myalgias, nausea, numbness, rash or sore throat. Exacerbated by: cold weather and pressure change. Treatments tried: Cymbalta. The treatment provided mild relief.        The following portions of the patient's history were reviewed and updated as appropriate: allergies, current medications, past family history, past medical history, past social history, past surgical history and problem list.    Review of Systems   Constitutional: Negative.  Negative for activity change, fatigue, fever, unexpected weight gain and unexpected weight loss.   HENT: Negative.  Negative for congestion, sneezing and sore throat.    Eyes: Negative.  Negative for blurred vision, double vision and visual disturbance.   Respiratory: Negative.  Negative for cough, chest tightness, shortness of breath and wheezing.    Cardiovascular: Negative.  Negative for chest pain, palpitations and leg swelling.   Gastrointestinal: Negative.  Negative for abdominal distention, abdominal pain, blood in stool, constipation, diarrhea and nausea.   Endocrine: Negative.  Negative for cold intolerance, heat intolerance, polydipsia, polyphagia and polyuria.    Genitourinary: Negative.  Negative for urinary incontinence, dysuria, frequency and urgency.   Musculoskeletal: Negative.  Negative for arthralgias and myalgias.   Skin: Negative.  Negative for rash.   Allergic/Immunologic: Negative.    Neurological: Negative.  Negative for dizziness, syncope, numbness and memory problem.   Hematological: Negative.  Negative for adenopathy.   Psychiatric/Behavioral: Negative.  Negative for suicidal ideas and depressed mood. The patient is not nervous/anxious.    All other systems reviewed and are negative.      Objective   Physical Exam   Constitutional: She is oriented to person, place, and time. She appears well-developed and well-nourished.   HENT:   Head: Normocephalic.   Right Ear: External ear normal.   Left Ear: External ear normal.   Nose: Nose normal.   Mouth/Throat: Oropharynx is clear and moist. No oropharyngeal exudate.   Eyes: Conjunctivae and EOM are normal. Pupils are equal, round, and reactive to light.   Neck: Normal range of motion. Neck supple. No thyromegaly present.   Cardiovascular: Normal rate, regular rhythm, normal heart sounds and intact distal pulses.   No murmur heard.  Pulmonary/Chest: Effort normal and breath sounds normal. No respiratory distress. She exhibits no tenderness.   Abdominal: Soft. Bowel sounds are normal. She exhibits no distension and no mass. There is no tenderness. There is no rebound and no guarding.   Musculoskeletal: Normal range of motion.   Lymphadenopathy:     She has no cervical adenopathy.   Neurological: She is alert and oriented to person, place, and time. She has normal reflexes. She displays normal reflexes. She exhibits normal muscle tone. Coordination normal.   Skin: Skin is warm and dry. No rash noted. She is not diaphoretic. No erythema.   Psychiatric: She has a normal mood and affect. Her behavior is normal. Judgment and thought content normal.   Nursing note and vitals reviewed.        Assessment/Plan   Christi was  seen today for diabetes, hyperlipidemia, hypothyroidism and fibromyalgia.    Diagnoses and all orders for this visit:    Type 2 diabetes mellitus without complication, unspecified whether long term insulin use (CMS/[facility])  -     POC Glycosylated Hemoglobin (Hb A1C)  -     Comprehensive Metabolic Panel  -     CBC & Differential  -     Lipid Panel  -     TSH  -     CBC Auto Differential    Mixed hyperlipidemia    Acquired hypothyroidism    Fibromyalgia    Other orders  -     Empagliflozin-Metformin HCl ER (SYNJARDY XR)  MG tablet sustained-release 24 hour; Take 1 tablet by mouth Daily.  -     glimepiride (AMARYL) 4 MG tablet; Take 1 tablet by mouth Every Morning Before Breakfast.  -     fenofibrate (TRICOR) 145 MG tablet; Take 1 tablet by mouth Daily.  -     levothyroxine (SYNTHROID, LEVOTHROID) 25 MCG tablet; Take 1 tablet by mouth Daily.  -     pravastatin (PRAVACHOL) 40 MG tablet; Take 1 tablet by mouth Daily.        Continues current meds. Increase Synjardy XR to 10/1000 qd.

## 2019-02-18 ENCOUNTER — TELEPHONE (OUTPATIENT)
Dept: FAMILY MEDICINE CLINIC | Facility: CLINIC | Age: 59
End: 2019-02-18

## 2019-02-18 NOTE — TELEPHONE ENCOUNTER
Called and spoke with pharmacy , I attempted a PA this morning and it stated that it did not need a PA , Pharmacist stated that the 5mg was covered and she is not aware if the formulary was changed . She stated she will call them and ask .                     ----- Message from Arnaldo Dangelo sent at 2/16/2019 10:09 AM EST -----  Regarding: prior auth  PT NEEDS A PRIOR AUTHORIZATION FOR HER SYNJARDY

## 2019-02-19 ENCOUNTER — TELEPHONE (OUTPATIENT)
Dept: FAMILY MEDICINE CLINIC | Facility: CLINIC | Age: 59
End: 2019-02-19

## 2019-02-19 NOTE — TELEPHONE ENCOUNTER
----- Message from Arnaldo Dangelo Rep sent at 2/16/2019 10:09 AM EST -----  Regarding: prior auth  PT NEEDS A PRIOR AUTHORIZATION FOR HER SYNJARDY

## 2019-03-05 ENCOUNTER — TELEPHONE (OUTPATIENT)
Dept: FAMILY MEDICINE CLINIC | Facility: CLINIC | Age: 59
End: 2019-03-05

## 2019-03-05 DIAGNOSIS — IMO0001 UNCONTROLLED DIABETES MELLITUS TYPE 2 WITHOUT COMPLICATIONS: Primary | ICD-10-CM

## 2019-03-05 NOTE — TELEPHONE ENCOUNTER
Please have pt report to ER for elevated glucose and fatigue. I also will place an order for pt to see endo.

## 2019-03-05 NOTE — TELEPHONE ENCOUNTER
Pt called in stating the synjardy that Dr. Gunn put her on is not working. She states when she checks her glucose in the morning it is almost 500 and she's barely able to stay awake. Advised pt I would consult with Dr. Gunn and give pt a call back.

## 2019-03-05 NOTE — TELEPHONE ENCOUNTER
----- Message from Ifeoma Velasquez MA sent at 3/5/2019 12:43 PM EST -----  Pt called in stating synjardy is not working for her and when she checks glucose in the morning it is almost 500. She states she is barely able to stay awake. I advised pt I would consult with you about this and give her a call back.

## 2020-02-07 RX ORDER — PRAVASTATIN SODIUM 40 MG
TABLET ORAL
Qty: 30 TABLET | Refills: 3 | OUTPATIENT
Start: 2020-02-07

## 2020-02-07 RX ORDER — LEVOTHYROXINE SODIUM 0.03 MG/1
TABLET ORAL
Qty: 30 TABLET | Refills: 3 | OUTPATIENT
Start: 2020-02-07

## 2020-02-07 RX ORDER — FENOFIBRATE 145 MG/1
TABLET, COATED ORAL
Qty: 30 TABLET | Refills: 3 | OUTPATIENT
Start: 2020-02-07

## 2021-07-12 ENCOUNTER — OFFICE VISIT (OUTPATIENT)
Dept: FAMILY MEDICINE CLINIC | Facility: CLINIC | Age: 61
End: 2021-07-12

## 2021-07-12 VITALS
TEMPERATURE: 98.2 F | HEIGHT: 63 IN | SYSTOLIC BLOOD PRESSURE: 120 MMHG | DIASTOLIC BLOOD PRESSURE: 62 MMHG | WEIGHT: 165.8 LBS | OXYGEN SATURATION: 98 % | BODY MASS INDEX: 29.38 KG/M2 | HEART RATE: 80 BPM

## 2021-07-12 DIAGNOSIS — E78.2 MIXED HYPERLIPIDEMIA: Chronic | ICD-10-CM

## 2021-07-12 DIAGNOSIS — Z12.31 ENCOUNTER FOR SCREENING MAMMOGRAM FOR BREAST CANCER: ICD-10-CM

## 2021-07-12 DIAGNOSIS — E03.9 ACQUIRED HYPOTHYROIDISM: Chronic | ICD-10-CM

## 2021-07-12 DIAGNOSIS — R42 LIGHTHEADED: ICD-10-CM

## 2021-07-12 DIAGNOSIS — I10 ESSENTIAL HYPERTENSION: Chronic | ICD-10-CM

## 2021-07-12 DIAGNOSIS — E11.9 TYPE 2 DIABETES MELLITUS WITHOUT COMPLICATION, WITHOUT LONG-TERM CURRENT USE OF INSULIN (HCC): Primary | Chronic | ICD-10-CM

## 2021-07-12 DIAGNOSIS — I25.10 CORONARY ARTERY DISEASE INVOLVING NATIVE CORONARY ARTERY OF NATIVE HEART WITHOUT ANGINA PECTORIS: ICD-10-CM

## 2021-07-12 LAB
ALBUMIN SERPL-MCNC: 4.5 G/DL (ref 3.5–5.2)
ALBUMIN/GLOB SERPL: 1.5 G/DL
ALP SERPL-CCNC: 45 U/L (ref 39–117)
ALT SERPL W P-5'-P-CCNC: 49 U/L (ref 1–33)
ANION GAP SERPL CALCULATED.3IONS-SCNC: 11.6 MMOL/L (ref 5–15)
AST SERPL-CCNC: 40 U/L (ref 1–32)
BASOPHILS # BLD AUTO: 0.03 10*3/MM3 (ref 0–0.2)
BASOPHILS NFR BLD AUTO: 0.5 % (ref 0–1.5)
BILIRUB SERPL-MCNC: 0.3 MG/DL (ref 0–1.2)
BUN SERPL-MCNC: 14 MG/DL (ref 8–23)
BUN/CREAT SERPL: 21.5 (ref 7–25)
CALCIUM SPEC-SCNC: 9.8 MG/DL (ref 8.6–10.5)
CHLORIDE SERPL-SCNC: 101 MMOL/L (ref 98–107)
CHOLEST SERPL-MCNC: 235 MG/DL (ref 0–200)
CO2 SERPL-SCNC: 24.4 MMOL/L (ref 22–29)
CREAT SERPL-MCNC: 0.65 MG/DL (ref 0.57–1)
DEPRECATED RDW RBC AUTO: 41.9 FL (ref 37–54)
EOSINOPHIL # BLD AUTO: 0 10*3/MM3 (ref 0–0.4)
EOSINOPHIL NFR BLD AUTO: 0 % (ref 0.3–6.2)
ERYTHROCYTE [DISTWIDTH] IN BLOOD BY AUTOMATED COUNT: 14.5 % (ref 12.3–15.4)
GFR SERPL CREATININE-BSD FRML MDRD: 93 ML/MIN/1.73
GLOBULIN UR ELPH-MCNC: 3.1 GM/DL
GLUCOSE SERPL-MCNC: 191 MG/DL (ref 65–99)
HBA1C MFR BLD: 10.1 %
HCT VFR BLD AUTO: 42.8 % (ref 34–46.6)
HDLC SERPL-MCNC: 34 MG/DL (ref 40–60)
HGB BLD-MCNC: 13.9 G/DL (ref 12–15.9)
IMM GRANULOCYTES # BLD AUTO: 0.02 10*3/MM3 (ref 0–0.05)
IMM GRANULOCYTES NFR BLD AUTO: 0.3 % (ref 0–0.5)
LDLC SERPL CALC-MCNC: 93 MG/DL (ref 0–100)
LDLC/HDLC SERPL: 2.12 {RATIO}
LYMPHOCYTES # BLD AUTO: 2.67 10*3/MM3 (ref 0.7–3.1)
LYMPHOCYTES NFR BLD AUTO: 41.3 % (ref 19.6–45.3)
MCH RBC QN AUTO: 26.5 PG (ref 26.6–33)
MCHC RBC AUTO-ENTMCNC: 32.5 G/DL (ref 31.5–35.7)
MCV RBC AUTO: 81.5 FL (ref 79–97)
MONOCYTES # BLD AUTO: 0.5 10*3/MM3 (ref 0.1–0.9)
MONOCYTES NFR BLD AUTO: 7.7 % (ref 5–12)
NEUTROPHILS NFR BLD AUTO: 3.25 10*3/MM3 (ref 1.7–7)
NEUTROPHILS NFR BLD AUTO: 50.2 % (ref 42.7–76)
NRBC BLD AUTO-RTO: 0 /100 WBC (ref 0–0.2)
PLATELET # BLD AUTO: 234 10*3/MM3 (ref 140–450)
PMV BLD AUTO: 10.9 FL (ref 6–12)
POTASSIUM SERPL-SCNC: 4.6 MMOL/L (ref 3.5–5.2)
PROT SERPL-MCNC: 7.6 G/DL (ref 6–8.5)
RBC # BLD AUTO: 5.25 10*6/MM3 (ref 3.77–5.28)
SODIUM SERPL-SCNC: 137 MMOL/L (ref 136–145)
TRIGL SERPL-MCNC: 645 MG/DL (ref 0–150)
VLDLC SERPL-MCNC: 108 MG/DL (ref 5–40)
WBC # BLD AUTO: 6.47 10*3/MM3 (ref 3.4–10.8)

## 2021-07-12 PROCEDURE — 99214 OFFICE O/P EST MOD 30 MIN: CPT | Performed by: FAMILY MEDICINE

## 2021-07-12 PROCEDURE — 80061 LIPID PANEL: CPT | Performed by: FAMILY MEDICINE

## 2021-07-12 PROCEDURE — 85025 COMPLETE CBC W/AUTO DIFF WBC: CPT | Performed by: FAMILY MEDICINE

## 2021-07-12 PROCEDURE — 84443 ASSAY THYROID STIM HORMONE: CPT | Performed by: FAMILY MEDICINE

## 2021-07-12 PROCEDURE — 80053 COMPREHEN METABOLIC PANEL: CPT | Performed by: FAMILY MEDICINE

## 2021-07-12 PROCEDURE — 83036 HEMOGLOBIN GLYCOSYLATED A1C: CPT | Performed by: FAMILY MEDICINE

## 2021-07-12 PROCEDURE — 3046F HEMOGLOBIN A1C LEVEL >9.0%: CPT | Performed by: FAMILY MEDICINE

## 2021-07-12 RX ORDER — MIRTAZAPINE 15 MG/1
1 TABLET, FILM COATED ORAL NIGHTLY
COMMUNITY
Start: 2021-07-05 | End: 2022-03-16 | Stop reason: SDUPTHER

## 2021-07-12 RX ORDER — VENLAFAXINE HYDROCHLORIDE 150 MG/1
1 CAPSULE, EXTENDED RELEASE ORAL DAILY
COMMUNITY
Start: 2021-07-06 | End: 2023-01-31

## 2021-07-12 RX ORDER — LISINOPRIL 2.5 MG/1
1 TABLET ORAL DAILY
COMMUNITY
Start: 2021-07-05 | End: 2021-08-30 | Stop reason: SDUPTHER

## 2021-07-12 RX ORDER — FENOFIBRATE 145 MG/1
145 TABLET, COATED ORAL DAILY
Qty: 30 TABLET | Refills: 3 | Status: SHIPPED | OUTPATIENT
Start: 2021-07-12 | End: 2021-11-11

## 2021-07-12 NOTE — PROGRESS NOTES
"Chief Complaint  Diabetes, Establish Care (re establish care with new insurance), and Depression    Subjective          Christi Chapa presents to [facility] for   Has not been seen here for 2 1/2 years. Has been seen at [facility]. Was last seen 3 months ago.  For F/U DM, HTN, HL, hypothyroid. Stable on meds    Has had recent lightheadedness and vertigo. Pt said this started after a recent chiropractic treatment. Worse with lying down. No OTC treatment.    Has had increased depression. Has been seeing [facility] and was changed to Effexor and Remeron. Used to be on Cymbalta. Will be following up with them. Has had increased grief reaction with 3 deaths in the family.    Needs to see Cardiology for CAD.     Needs a screening mammo.      Diabetes  She presents for her follow-up diabetic visit. She has type 2 diabetes mellitus. Her disease course has been fluctuating. There are no hypoglycemic associated symptoms. Pertinent negatives for hypoglycemia include no dizziness or nervousness/anxiousness. Associated symptoms include foot paresthesias. Pertinent negatives for diabetes include no chest pain and no fatigue. There are no hypoglycemic complications. Symptoms are stable. There are no diabetic complications. Risk factors for coronary artery disease include diabetes mellitus, dyslipidemia, family history, hypertension, obesity, post-menopausal and sedentary lifestyle. Current diabetic treatment includes oral agent (triple therapy). She is compliant with treatment some of the time. She has not had a previous visit with a dietitian. She rarely participates in exercise. There is no change in her home blood glucose trend. An ACE inhibitor/angiotensin II receptor blocker is being taken.         Objective   Vital Signs:   /62 (BP Location: Left arm, Patient Position: Sitting, Cuff Size: Adult)   Pulse 80   Temp 98.2 °F (36.8 °C) (Temporal)   Ht 158.8 cm (62.5\")   Wt " 75.2 kg (165 lb 12.8 oz)   SpO2 98%   BMI 29.84 kg/m²     Review of Systems   Constitutional: Negative.  Negative for activity change, fatigue, fever and unexpected weight change.   HENT: Negative.  Negative for congestion, sneezing and sore throat.    Eyes: Negative.  Negative for visual disturbance.   Respiratory: Negative.  Negative for cough, chest tightness, shortness of breath and wheezing.    Cardiovascular: Negative.  Negative for chest pain, palpitations and leg swelling.   Gastrointestinal: Negative.  Negative for abdominal distention, abdominal pain, blood in stool, constipation, diarrhea and nausea.   Endocrine: Negative.  Negative for cold intolerance and heat intolerance.   Genitourinary: Negative.  Negative for dysuria, frequency and urgency.   Musculoskeletal: Negative.  Negative for arthralgias and myalgias.   Skin: Negative.  Negative for rash.   Allergic/Immunologic: Negative.    Neurological: Negative.  Negative for dizziness, syncope and numbness.   Hematological: Negative.  Negative for adenopathy.   Psychiatric/Behavioral: Negative.  Negative for suicidal ideas. The patient is not nervous/anxious.        Physical Exam  Vitals and nursing note reviewed.   Constitutional:       General: She is not in acute distress.     Appearance: She is well-developed. She is not diaphoretic.   HENT:      Right Ear: External ear normal.      Left Ear: External ear normal.      Nose: Nose normal.   Eyes:      Conjunctiva/sclera: Conjunctivae normal.      Pupils: Pupils are equal, round, and reactive to light.   Neck:      Thyroid: No thyromegaly.   Cardiovascular:      Rate and Rhythm: Normal rate and regular rhythm.      Heart sounds: Normal heart sounds. No murmur heard.     Pulmonary:      Effort: Pulmonary effort is normal. No respiratory distress.      Breath sounds: Normal breath sounds. No wheezing.   Abdominal:      General: Bowel sounds are normal. There is no distension.      Palpations: Abdomen is  soft. There is no mass.      Tenderness: There is no abdominal tenderness. There is no guarding or rebound.      Hernia: No hernia is present.   Musculoskeletal:         General: No tenderness. Normal range of motion.      Cervical back: Normal range of motion and neck supple.   Lymphadenopathy:      Cervical: No cervical adenopathy.   Skin:     General: Skin is warm and dry.      Coloration: Skin is not pale.      Findings: No erythema or rash.   Neurological:      Mental Status: She is alert and oriented to person, place, and time.      Deep Tendon Reflexes: Reflexes are normal and symmetric.   Psychiatric:         Behavior: Behavior normal.         Thought Content: Thought content normal.         Judgment: Judgment normal.        Result Review :                 Assessment and Plan    Problem List Items Addressed This Visit        Cardiac and Vasculature    Hyperlipidemia    Relevant Medications    fenofibrate (TRICOR) 145 MG tablet    Other Relevant Orders    Lipid Panel    Hypertension    Relevant Medications    lisinopril (PRINIVIL,ZESTRIL) 2.5 MG tablet       Endocrine and Metabolic    Hypothyroidism    Relevant Orders    TSH    Diabetes mellitus (CMS/HCC) - Primary    Relevant Medications    empagliflozin (Jardiance) 25 MG tablet tablet    metFORMIN (GLUCOPHAGE) 1000 MG tablet    Other Relevant Orders    POC Glycosylated Hemoglobin (Hb A1C) (Completed)    Ambulatory Referral to Endocrinology    CBC & Differential    Comprehensive Metabolic Panel      Other Visit Diagnoses     Encounter for screening mammogram for breast cancer        Relevant Orders    Mammo Screening Digital Tomosynthesis Bilateral With CAD    Coronary artery disease involving native coronary artery of native heart without angina pectoris        Relevant Orders    Ambulatory Referral to Cardiology    Lightheaded          Will f/u with psych.    Follow Up   Return in about 3 months (around 10/12/2021).  Patient was given instructions and  counseling regarding her condition or for health maintenance advice. Please see specific information pulled into the AVS if appropriate.

## 2021-07-13 LAB — TSH SERPL DL<=0.05 MIU/L-ACNC: 0.64 UIU/ML (ref 0.27–4.2)

## 2021-08-05 PROBLEM — I25.810 CORONARY ARTERY DISEASE INVOLVING CORONARY BYPASS GRAFT OF NATIVE HEART: Status: ACTIVE | Noted: 2021-08-05

## 2021-08-09 NOTE — PROGRESS NOTES
[facility]  Cardiology Consultation Note     Christi Chapa  1960  Requesting Provider: Rhonda Gunn DO  PCP: Rhonda Gunn DO    ID:  Christi Chapa is a 60 y.o. female who resides in [place], [place]    REASON FOR CONSULTATION:    • CAD without angina         Dear Dr. Rhonda Gunn:    Thank you for referring Jasmyn Torres to my office today for evaluation of CAD detected on lung cancer screening CT.  The patient is a 60-year-old female with uncontrolled diabetes, uncontrolled hyperlipidemia, essential hypertension.  She underwent a CT chest for lung cancer screening 1/30/2020.  This showed severe coronary artery calcification.    The patient is sedentary due to fibromyalgia.  She denies any exertional chest discomfort.  She does experience shortness of breath which she thinks is deconditioning.      Past Medical History, Past Surgical History, Family history, Social History, and Medications were all reviewed with the patient today and updated as necessary.       Current Outpatient Medications:   •  ALPRAZolam (XANAX) 1 MG tablet, Take  by mouth., Disp: , Rfl:   •  ARIPiprazole (ABILIFY) 2 MG tablet, Take 2 mg by mouth Daily., Disp: , Rfl:   •  Blood Glucose Monitoring Suppl (ONE TOUCH ULTRA MINI) w/Device kit, , Disp: , Rfl:   •  busPIRone (BUSPAR) 10 MG tablet, Take 5 mg by mouth 3 (Three) Times a Day., Disp: , Rfl:   •  cholecalciferol (VITAMIN D3) 1000 units tablet, Take 1,000 Units by mouth Daily., Disp: , Rfl:   •  conjugated estrogens (PREMARIN) 0.625 MG/GM vaginal cream, Insert 0.5 grams intravaginally, three times a week, Disp: 30 g, Rfl: 2  •  CRANBERRY PO, Take 4,200 mg by mouth 2 (two) times a day., Disp: , Rfl:   •  empagliflozin (Jardiance) 25 MG tablet tablet, Take 1 tablet by mouth Daily., Disp: , Rfl:   •  fenofibrate (TRICOR) 145 MG tablet, Take 1 tablet by mouth Daily., Disp: 30 tablet, Rfl: 3  •  fluticasone (FLONASE) 50 MCG/ACT nasal  spray, 2 sprays into the nostril(s) as directed by provider Daily., Disp: 1 bottle, Rfl: 3  •  glimepiride (AMARYL) 4 MG tablet, Take 1 tablet by mouth Every Morning Before Breakfast., Disp: 30 tablet, Rfl: 3  •  levothyroxine (SYNTHROID, LEVOTHROID) 25 MCG tablet, Take 1 tablet by mouth Daily., Disp: 30 tablet, Rfl: 3  •  lisinopril (PRINIVIL,ZESTRIL) 2.5 MG tablet, Take 1 tablet by mouth Daily., Disp: , Rfl:   •  Magnesium Oxide 400 (240 Mg) MG tablet, Take 1 tablet by mouth Daily., Disp: , Rfl:   •  metFORMIN (GLUCOPHAGE) 1000 MG tablet, Take 1,000 mg by mouth 2 (Two) Times a Day With Meals., Disp: , Rfl:   •  mirtazapine (REMERON) 15 MG tablet, Take 1 tablet by mouth Every Night., Disp: , Rfl:   •  Omega-3 Fatty Acids (FISH OIL PO), Take 1,000 mg by mouth 2 (two) times a day., Disp: , Rfl:   •  ONE TOUCH ULTRA TEST test strip, , Disp: , Rfl:   •  ONETOUCH DELICA LANCETS 33G misc, , Disp: , Rfl:   •  venlafaxine XR (EFFEXOR-XR) 150 MG 24 hr capsule, Take 1 capsule by mouth Daily., Disp: , Rfl:   •  aspirin (aspirin) 81 MG EC tablet, Take 1 tablet by mouth Daily., Disp: 90 tablet, Rfl: 3  •  rosuvastatin (CRESTOR) 10 MG tablet, Take 1 tablet by mouth Every Night., Disp: 90 tablet, Rfl: 3    Allergies   Allergen Reactions   • Atorvastatin    • Simvastatin    • Trulicity  [Dulaglutide]    • Sulfa Antibiotics Rash         Past Medical History:   Diagnosis Date   • Anxiety    • Arthritis    • Depression    • Diabetes mellitus (CMS/HCC)    • Fibromyalgia, primary    • GERD (gastroesophageal reflux disease)    • Hyperlipidemia    • Hypertension    • Hypothyroidism    • IBS (irritable bowel syndrome)    • New onset of headaches        Past Surgical History:   Procedure Laterality Date   • APPENDECTOMY     • BREAST EXCISIONAL BIOPSY Left     [place]   • CHOLECYSTECTOMY     • EYE SURGERY      eye lid surgery 2020   • KIDNEY STONE SURGERY     • RHINOPLASTY         Family History   Problem Relation Age of Onset   •  "Diabetes Mother    • Cancer Father         melanoma   • Cancer Brother         melanoma   • Diabetes Brother    • Heart failure Brother    • Depression Brother    • Anxiety disorder Brother    • Bleeding Disorder Daughter    • Obesity Other    • Thyroid disease Other    • Pulmonary embolism Other    • Melanoma Other    • Arthritis Other    • Hyperlipidemia Other    • BRCA 1/2 Neg Hx    • Breast cancer Neg Hx    • Colon cancer Neg Hx    • Endometrial cancer Neg Hx    • Ovarian cancer Neg Hx        Social History     Tobacco Use   • Smoking status: Former Smoker     Packs/day: 1.00     Years: 27.00     Pack years: 27.00     Types: Cigarettes     Start date:      Quit date:      Years since quittin.6   • Smokeless tobacco: Never Used   • Tobacco comment:  QUIT    Substance Use Topics   • Alcohol use: No       Review of Systems   Constitutional: Negative for malaise/fatigue.   Eyes: Negative for vision loss in left eye and vision loss in right eye.   Cardiovascular: Positive for dyspnea on exertion. Negative for chest pain, near-syncope, orthopnea, palpitations, paroxysmal nocturnal dyspnea and syncope.   Musculoskeletal: Positive for myalgias.   Neurological: Negative for brief paralysis, excessive daytime sleepiness, focal weakness, numbness, paresthesias and weakness.   All other systems reviewed and are negative.              /70 (BP Location: Left arm, Patient Position: Sitting)   Pulse 79   Ht 158.8 cm (62.5\")   Wt 74.4 kg (164 lb)   SpO2 98%   BMI 29.52 kg/m²        Constitutional:       Appearance: Healthy appearance. Well-developed.   Eyes:      General: Lids are normal. No scleral icterus.     Conjunctiva/sclera: Conjunctivae normal.   HENT:      Head: Normocephalic and atraumatic.   Neck:      Thyroid: No thyromegaly.      Vascular: No carotid bruit or JVD. JVD normal.   Pulmonary:      Effort: Pulmonary effort is normal.      Breath sounds: Normal breath sounds. No wheezing. No " rhonchi. No rales.   Cardiovascular:      Normal rate. Regular rhythm.      Murmurs: There is no murmur.      No gallop. No rub.   Pulses:     Intact distal pulses.   Abdominal:      General: There is no distension.      Palpations: Abdomen is soft. There is no abdominal mass.   Musculoskeletal:      Extremities: No clubbing present.     Cervical back: Normal range of motion. Skin:     General: Skin is warm and dry. There is no cyanosis.      Findings: No rash.   Neurological:      General: No focal deficit present.      Mental Status: Alert and oriented to person, place, and time.      Gait: Gait is intact.   Psychiatric:         Attention and Perception: Attention normal.         Mood and Affect: Mood normal.         Behavior: Behavior normal.             ECG 12 Lead    Date/Time: 8/10/2021 11:43 AM  Performed by: Cesar Cerrato IV, MD  Authorized by: Cesar Cerrato IV, MD   Rhythm: sinus rhythm  BPM: 77  Conduction: incomplete right bundle branch block  Other findings: poor R wave progression    Clinical impression: abnormal EKG  Comments: Possible septal infarct            Lab Results   Component Value Date    CHOL 235 (H) 07/12/2021    HDL 34 (L) 07/12/2021    LDL 93 07/12/2021    VLDL 108 (H) 07/12/2021     Lab date: 7/12/2021  • CMP: Glu 191, BUN 14, Creat 0.65, eGFR 93, Na 137, K 4.6, Cl 101, CO2 24.4, Ca 9.8, Alk Phos 45, AST 40, ALT 49  • CBC: WBC 6.47, RBC 5.25, HGB 13.9, HCT 42.8, MCV 81.5, MCH 26.5,          Problem List Items Addressed This Visit        Cardiology Problems    Coronary artery disease involving native coronary artery of native heart with angina pectoris (CMS/[facility]) - Primary (Chronic)    Overview     · CT chest (1/30/2020): Severe coronary artery calcifications.          Current Assessment & Plan     · No anginal symptoms, but poor functional capacity due to fibromyalgia  · Multiple cardiac risk factors including hyperlipidemia and uncontrolled diabetes  · Risk  stratify with pharmacologic nuclear stress  · Start aspirin 81 mg daily  · Intensify statin therapy with         Relevant Medications    aspirin (aspirin) 81 MG EC tablet    Other Relevant Orders    Stress Test With Myocardial Perfusion (1 Day)    Essential hypertension (Chronic)    Overview     • Target blood pressure <130/80 mmHg         Current Assessment & Plan     · Mildly elevated initial visit  · Continue present medical therapy         Hyperlipidemia LDL goal <70 (Chronic)    Overview     • High intensity statin therapy indicated given the presence of CAD  • Intolerant to simvastatin and atorvastatin         Current Assessment & Plan     · High intensity statin therapy indicated CAD and uncontrolled diabetes  · Previous intolerance to simvastatin and atorvastatin  · We will try rosuvastatin 10 mg nightly  · CMP and lipids in 6 weeks         Relevant Medications    rosuvastatin (CRESTOR) 10 MG tablet    Other Relevant Orders    Comprehensive Metabolic Panel    Lipid Panel       Other    Type 2 diabetes mellitus, without long-term current use of insulin (CMS/[facility])    Current Assessment & Plan     · Uncontrolled  · Continue Jardiance for diabetes and CV risk reduction  · Intensify statin therapy  · Start aspirin                        · Pharmacologic nuclear stress  · Stop pravastatin  · Start rosuvastatin 10 mg nightly  · CMP and lipids in 6 weeks  Return in about 1 year (around 8/10/2022).          MAYO Cerrato MD, FACC, [facility]  Interventional Cardiology  08/10/21  11:46 EDT

## 2021-08-10 ENCOUNTER — OFFICE VISIT (OUTPATIENT)
Dept: CARDIOLOGY | Facility: CLINIC | Age: 61
End: 2021-08-10

## 2021-08-10 VITALS
DIASTOLIC BLOOD PRESSURE: 70 MMHG | WEIGHT: 164 LBS | SYSTOLIC BLOOD PRESSURE: 136 MMHG | HEIGHT: 63 IN | BODY MASS INDEX: 29.06 KG/M2 | OXYGEN SATURATION: 98 % | HEART RATE: 79 BPM

## 2021-08-10 DIAGNOSIS — I10 ESSENTIAL HYPERTENSION: Chronic | ICD-10-CM

## 2021-08-10 DIAGNOSIS — I25.119 CORONARY ARTERY DISEASE INVOLVING NATIVE CORONARY ARTERY OF NATIVE HEART WITH ANGINA PECTORIS (HCC): Primary | Chronic | ICD-10-CM

## 2021-08-10 DIAGNOSIS — E78.5 HYPERLIPIDEMIA LDL GOAL <70: Chronic | ICD-10-CM

## 2021-08-10 DIAGNOSIS — E11.9 TYPE 2 DIABETES MELLITUS WITHOUT COMPLICATION, WITHOUT LONG-TERM CURRENT USE OF INSULIN (HCC): ICD-10-CM

## 2021-08-10 PROBLEM — R11.2 DRUG-INDUCED NAUSEA AND VOMITING: Status: RESOLVED | Noted: 2018-08-08 | Resolved: 2021-08-10

## 2021-08-10 PROBLEM — T50.905A DRUG-INDUCED NAUSEA AND VOMITING: Status: RESOLVED | Noted: 2018-08-08 | Resolved: 2021-08-10

## 2021-08-10 PROBLEM — T50.905A MEDICATION SIDE EFFECT, INITIAL ENCOUNTER: Status: RESOLVED | Noted: 2018-08-08 | Resolved: 2021-08-10

## 2021-08-10 PROCEDURE — 99204 OFFICE O/P NEW MOD 45 MIN: CPT | Performed by: INTERNAL MEDICINE

## 2021-08-10 PROCEDURE — 93000 ELECTROCARDIOGRAM COMPLETE: CPT | Performed by: INTERNAL MEDICINE

## 2021-08-10 RX ORDER — ASPIRIN 81 MG/1
81 TABLET ORAL DAILY
Qty: 90 TABLET | Refills: 3 | Status: SHIPPED | OUTPATIENT
Start: 2021-08-10

## 2021-08-10 RX ORDER — ARIPIPRAZOLE 2 MG/1
2 TABLET ORAL DAILY
COMMUNITY
End: 2021-09-20

## 2021-08-10 RX ORDER — ROSUVASTATIN CALCIUM 10 MG/1
10 TABLET, COATED ORAL NIGHTLY
Qty: 90 TABLET | Refills: 3 | Status: SHIPPED | OUTPATIENT
Start: 2021-08-10 | End: 2021-08-24

## 2021-08-10 NOTE — ASSESSMENT & PLAN NOTE
· High intensity statin therapy indicated CAD and uncontrolled diabetes  · Previous intolerance to simvastatin and atorvastatin  · We will try rosuvastatin 10 mg nightly  · CMP and lipids in 6 weeks

## 2021-08-10 NOTE — ASSESSMENT & PLAN NOTE
· Uncontrolled  · Continue Jardiance for diabetes and CV risk reduction  · Intensify statin therapy  · Start aspirin

## 2021-08-10 NOTE — ASSESSMENT & PLAN NOTE
· No anginal symptoms, but poor functional capacity due to fibromyalgia  · Multiple cardiac risk factors including hyperlipidemia and uncontrolled diabetes  · Risk stratify with pharmacologic nuclear stress  · Start aspirin 81 mg daily  · Intensify statin therapy with

## 2021-08-24 ENCOUNTER — TELEPHONE (OUTPATIENT)
Dept: CARDIOLOGY | Facility: CLINIC | Age: 61
End: 2021-08-24

## 2021-08-24 RX ORDER — PRAVASTATIN SODIUM 40 MG
40 TABLET ORAL DAILY
Qty: 90 TABLET | Refills: 0 | Status: SHIPPED | OUTPATIENT
Start: 2021-08-24 | End: 2022-02-11 | Stop reason: SDUPTHER

## 2021-08-24 RX ORDER — EZETIMIBE 10 MG/1
10 TABLET ORAL DAILY
Qty: 90 TABLET | Refills: 0 | Status: SHIPPED | OUTPATIENT
Start: 2021-08-24 | End: 2021-11-17

## 2021-08-24 NOTE — TELEPHONE ENCOUNTER
Patient called to report that she is unable to tolerate Crestor due to myalgias/ She said she has been unable to tolerate multiple statins in the past. Most recently was on pravastatin and currently on fenofibrate as well.    Lab Results   Component Value Date    CHOL 235 (H) 07/12/2021    CHLPL 164 06/02/2016    TRIG 645 (H) 07/12/2021    HDL 34 (L) 07/12/2021    LDL 93 07/12/2021     Did you want to try Repatha or Zetia?

## 2021-08-24 NOTE — TELEPHONE ENCOUNTER
She says she would like to stay on Pravastatin as well. She will get labs at PCP. Orders placed previously.

## 2021-08-30 DIAGNOSIS — I10 ESSENTIAL HYPERTENSION: Chronic | ICD-10-CM

## 2021-08-30 RX ORDER — LEVOTHYROXINE SODIUM 0.03 MG/1
25 TABLET ORAL DAILY
Qty: 30 TABLET | Refills: 3 | Status: SHIPPED | OUTPATIENT
Start: 2021-08-30 | End: 2021-12-10 | Stop reason: SDUPTHER

## 2021-08-30 RX ORDER — LISINOPRIL 2.5 MG/1
2.5 TABLET ORAL DAILY
Qty: 30 TABLET | Refills: 3 | Status: SHIPPED | OUTPATIENT
Start: 2021-08-30 | End: 2021-12-10 | Stop reason: SDUPTHER

## 2021-09-09 ENCOUNTER — APPOINTMENT (OUTPATIENT)
Dept: OTHER | Facility: HOSPITAL | Age: 61
End: 2021-09-09

## 2021-09-09 ENCOUNTER — HOSPITAL ENCOUNTER (OUTPATIENT)
Dept: MAMMOGRAPHY | Facility: HOSPITAL | Age: 61
Discharge: HOME | End: 2021-09-09
Admitting: FAMILY MEDICINE

## 2021-09-09 DIAGNOSIS — Z12.31 ENCOUNTER FOR SCREENING MAMMOGRAM FOR BREAST CANCER: ICD-10-CM

## 2021-09-09 PROCEDURE — 77067 SCR MAMMO BI INCL CAD: CPT

## 2021-09-09 PROCEDURE — 77067 SCR MAMMO BI INCL CAD: CPT | Performed by: RADIOLOGY

## 2021-09-09 PROCEDURE — 77063 BREAST TOMOSYNTHESIS BI: CPT | Performed by: RADIOLOGY

## 2021-09-09 PROCEDURE — 77063 BREAST TOMOSYNTHESIS BI: CPT

## 2021-09-20 ENCOUNTER — LAB (OUTPATIENT)
Dept: LAB | Facility: HOSPITAL | Age: 61
End: 2021-09-20

## 2021-09-20 ENCOUNTER — OFFICE VISIT (OUTPATIENT)
Dept: ENDOCRINOLOGY | Facility: CLINIC | Age: 61
End: 2021-09-20

## 2021-09-20 VITALS
WEIGHT: 162 LBS | HEART RATE: 96 BPM | SYSTOLIC BLOOD PRESSURE: 146 MMHG | HEIGHT: 62 IN | DIASTOLIC BLOOD PRESSURE: 78 MMHG | BODY MASS INDEX: 29.81 KG/M2 | OXYGEN SATURATION: 97 %

## 2021-09-20 DIAGNOSIS — E78.5 HYPERLIPIDEMIA LDL GOAL <70: Primary | ICD-10-CM

## 2021-09-20 DIAGNOSIS — E78.5 HYPERLIPIDEMIA, UNSPECIFIED HYPERLIPIDEMIA TYPE: ICD-10-CM

## 2021-09-20 DIAGNOSIS — E11.65 TYPE 2 DIABETES MELLITUS WITH HYPERGLYCEMIA, WITHOUT LONG-TERM CURRENT USE OF INSULIN (HCC): Primary | ICD-10-CM

## 2021-09-20 DIAGNOSIS — E03.9 HYPOTHYROIDISM, UNSPECIFIED TYPE: ICD-10-CM

## 2021-09-20 LAB
EXPIRATION DATE: ABNORMAL
GLUCOSE BLDC GLUCOMTR-MCNC: 224 MG/DL (ref 70–130)
Lab: ABNORMAL

## 2021-09-20 PROCEDURE — 36415 COLL VENOUS BLD VENIPUNCTURE: CPT

## 2021-09-20 PROCEDURE — 82947 ASSAY GLUCOSE BLOOD QUANT: CPT | Performed by: INTERNAL MEDICINE

## 2021-09-20 PROCEDURE — 80053 COMPREHEN METABOLIC PANEL: CPT

## 2021-09-20 PROCEDURE — 80061 LIPID PANEL: CPT

## 2021-09-20 PROCEDURE — 82043 UR ALBUMIN QUANTITATIVE: CPT | Performed by: INTERNAL MEDICINE

## 2021-09-20 PROCEDURE — 99204 OFFICE O/P NEW MOD 45 MIN: CPT | Performed by: INTERNAL MEDICINE

## 2021-09-20 PROCEDURE — 82570 ASSAY OF URINE CREATININE: CPT | Performed by: INTERNAL MEDICINE

## 2021-09-20 RX ORDER — LANCETS 33 GAUGE
EACH MISCELLANEOUS
Qty: 100 EACH | Refills: 3 | Status: SHIPPED | OUTPATIENT
Start: 2021-09-20 | End: 2021-09-21

## 2021-09-20 RX ORDER — ARIPIPRAZOLE 5 MG/1
TABLET ORAL
COMMUNITY
Start: 2021-08-18 | End: 2022-07-13

## 2021-09-20 RX ORDER — GLIMEPIRIDE 4 MG/1
4 TABLET ORAL 2 TIMES DAILY
Qty: 180 TABLET | Refills: 1 | Status: SHIPPED | OUTPATIENT
Start: 2021-09-20 | End: 2022-01-06 | Stop reason: SDUPTHER

## 2021-09-20 NOTE — PROGRESS NOTES
Chief Complaint   Patient presents with   • Diabetes        New patient who is being seen in consultation regarding diabetes at the request of Rhonda Gunn DO HPI   Christi Chapa is a 60 y.o. female who presents for evaluation of diabetes.      Patient has a history of type 2 diabetes.  This was initially diagnosed more than 10 years ago after patient checks her glucose on exam and was glucometer which was noted to be high. Patient is currently taking Metformin 1000 mg twice daily, Jardiance 25 mg daily, glimepiride 4 mg twice daily. Patient previously did not tolerate trulicity due to nausea.  Patient also reports she is taking 70/30 in the past but states she was able to come off this medication after she made dietary changes.  This regimen was last changed more than 1 year ago.  Patient believes that glycemic control is poor.  Patient reports most recent A1c was above 10. Patient reports good adherence to medications.      Patient denies any recent hypoglycemia.  Patient does not monitor blood glucose.  Patient does not follow a diabetic diet.  Patient does not exercise regularly.     Patient's last dilated eye exam was approximately 6months ago at .  Patient denies acute visual changes.  History of dyslipidemia: Yes. Previously intolerant to high intensity statins including atorvastatin and rosuvastatin, followed by cardiology.  Recently started on Zetia.  Taking fenofibrate 145 mg daily and Zetia 10 mg daily and pravastatin 40 mg daily. She is having a stress test next week  History of renal dysfunction: No  History of Hypertension: Yes    Patient is currently taking levothyroxine 25 mcg daily for hypothyroidism.  She reports this dose has been stable for many years.  Hypothyroidism diagnosed during evaluation for her loss.      Past Medical History:   Diagnosis Date   • Anxiety    • Arthritis    • Depression    • Diabetes mellitus (CMS/[facility], [facility])    • Fibromyalgia, primary    • GERD (gastroesophageal  reflux disease)    • Hyperlipidemia    • Hypertension    • Hypothyroidism    • IBS (irritable bowel syndrome)    • New onset of headaches    • Type 2 diabetes mellitus (CMS/HCC)      Past Surgical History:   Procedure Laterality Date   • APPENDECTOMY     • BREAST EXCISIONAL BIOPSY Left     [place], [place]   • CHOLECYSTECTOMY     • EYE SURGERY      eye lid surgery    • KIDNEY STONE SURGERY     • RHINOPLASTY        Family History   Problem Relation Age of Onset   • Diabetes Mother    • COPD Mother    • Hypertension Mother    • Thyroid disease Mother    • Heart disease Mother    • Cancer Father         melanoma   • Melanoma Father    • Diabetes Brother    • Depression Brother    • Anxiety disorder Brother    • Bleeding Disorder Daughter    • Fibromyalgia Daughter    • Obesity Other    • Thyroid disease Other    • Pulmonary embolism Other    • Melanoma Other    • Arthritis Other    • Hyperlipidemia Other    • Cancer Brother    • Melanoma Brother    • Anxiety disorder Brother    • Depression Brother    • Heart attack Brother    • Diabetes Brother    • Anxiety disorder Brother    • Depression Brother    • Heart failure Brother    • Thyroid disease Brother    • BRCA 1/2 Neg Hx    • Breast cancer Neg Hx    • Colon cancer Neg Hx    • Endometrial cancer Neg Hx    • Ovarian cancer Neg Hx       Social History     Socioeconomic History   • Marital status:      Spouse name: Not on file   • Number of children: Not on file   • Years of education: Not on file   • Highest education level: Not on file   Tobacco Use   • Smoking status: Former Smoker     Packs/day: 1.00     Years: 27.00     Pack years: 27.00     Types: Cigarettes     Start date:      Quit date:      Years since quittin.7   • Smokeless tobacco: Never Used   • Tobacco comment:  QUIT    Vaping Use   • Vaping Use: Never used   Substance and Sexual Activity   • Alcohol use: No   • Drug use: No   • Sexual activity: Yes     Partners: Male     Birth  control/protection: Post-menopausal      Allergies   Allergen Reactions   • Atorvastatin    • Crestor [Rosuvastatin Calcium] Myalgia   • Simvastatin    • Trulicity  [Dulaglutide]    • Sulfa Antibiotics Rash      Current Outpatient Medications on File Prior to Visit   Medication Sig Dispense Refill   • ALPRAZolam (XANAX) 1 MG tablet Take  by mouth.     • aspirin (aspirin) 81 MG EC tablet Take 1 tablet by mouth Daily. 90 tablet 3   • Blood Glucose Monitoring Suppl (ONE TOUCH ULTRA MINI) w/Device kit      • busPIRone (BUSPAR) 10 MG tablet Take 5 mg by mouth 3 (Three) Times a Day.     • ezetimibe (ZETIA) 10 MG tablet Take 1 tablet by mouth Daily. 90 tablet 0   • fenofibrate (TRICOR) 145 MG tablet Take 1 tablet by mouth Daily. 30 tablet 3   • levothyroxine (SYNTHROID, LEVOTHROID) 25 MCG tablet Take 1 tablet by mouth Daily. 30 tablet 3   • lisinopril (PRINIVIL,ZESTRIL) 2.5 MG tablet Take 1 tablet by mouth Daily. 30 tablet 3   • Magnesium Oxide 400 (240 Mg) MG tablet Take 1 tablet by mouth Daily.     • mirtazapine (REMERON) 15 MG tablet Take 1 tablet by mouth Every Night.     • Omega-3 Fatty Acids (FISH OIL PO) Take 1,000 mg by mouth 2 (two) times a day.     • pravastatin (Pravachol) 40 MG tablet Take 1 tablet by mouth Daily. 90 tablet 0   • venlafaxine XR (EFFEXOR-XR) 150 MG 24 hr capsule Take 1 capsule by mouth Daily.     • [DISCONTINUED] empagliflozin (Jardiance) 25 MG tablet tablet Take 1 tablet by mouth Daily.     • [DISCONTINUED] glimepiride (AMARYL) 4 MG tablet Take 1 tablet by mouth Every Morning Before Breakfast. 30 tablet 3   • [DISCONTINUED] metFORMIN (GLUCOPHAGE) 1000 MG tablet Take 1,000 mg by mouth 2 (Two) Times a Day With Meals.     • [DISCONTINUED] ONE TOUCH ULTRA TEST test strip      • [DISCONTINUED] ONETOUCH DELICA LANCETS 33G misc      • ARIPiprazole (ABILIFY) 5 MG tablet      • [DISCONTINUED] ARIPiprazole (ABILIFY) 2 MG tablet Take 2 mg by mouth Daily.     • [DISCONTINUED] cholecalciferol (VITAMIN D3)  "1000 units tablet Take 1,000 Units by mouth Daily.     • [DISCONTINUED] conjugated estrogens (PREMARIN) 0.625 MG/GM vaginal cream Insert 0.5 grams intravaginally, three times a week 30 g 2   • [DISCONTINUED] CRANBERRY PO Take 4,200 mg by mouth 2 (two) times a day.     • [DISCONTINUED] fluticasone (FLONASE) 50 MCG/ACT nasal spray 2 sprays into the nostril(s) as directed by provider Daily. 1 bottle 3     No current facility-administered medications on file prior to visit.        Review of Systems   Constitutional: Positive for fatigue and unexpected weight gain.   HENT: Negative for trouble swallowing and voice change.    Eyes: Negative for pain and visual disturbance.   Respiratory: Negative for cough and shortness of breath.    Cardiovascular: Negative for palpitations and leg swelling.   Gastrointestinal: Negative for abdominal pain, constipation and diarrhea.   Endocrine: Negative for polydipsia and polyuria.   Musculoskeletal: Positive for arthralgias and myalgias.   Skin: Negative for dry skin and rash.   Neurological: Negative for tremors and headache.   Psychiatric/Behavioral: Negative for sleep disturbance. The patient is not nervous/anxious.         Vitals:    09/20/21 1357   BP: 146/78   BP Location: Left arm   Patient Position: Sitting   Cuff Size: Adult   Pulse: 96   SpO2: 97%   Weight: 73.5 kg (162 lb)   Height: 157.5 cm (62\")   Body mass index is 29.63 kg/m².     Physical Exam  Vitals reviewed.   Constitutional:       General: She is not in acute distress.     Appearance: She is overweight.   HENT:      Head: Normocephalic and atraumatic.      Right Ear: Hearing normal.      Left Ear: Hearing normal.      Nose: Nose normal.   Eyes:      General: Lids are normal.      Conjunctiva/sclera: Conjunctivae normal.   Neck:      Thyroid: No thyromegaly or thyroid tenderness.   Cardiovascular:      Rate and Rhythm: Normal rate and regular rhythm.      Pulses:           Dorsalis pedis pulses are 1+ on the right " side and 1+ on the left side.        Posterior tibial pulses are 1+ on the right side and 1+ on the left side.      Heart sounds: No murmur heard.     Pulmonary:      Effort: Pulmonary effort is normal.      Breath sounds: Normal breath sounds and air entry.   Abdominal:      General: Bowel sounds are normal.      Palpations: Abdomen is soft.      Tenderness: There is no abdominal tenderness.   Feet:      Right foot:      Protective Sensation: 10 sites tested. 10 sites sensed.      Skin integrity: Skin integrity normal.      Left foot:      Protective Sensation: 10 sites tested. 10 sites sensed.      Skin integrity: Skin integrity normal.   Lymphadenopathy:      Head:      Right side of head: No submandibular adenopathy.      Left side of head: No submandibular adenopathy.      Cervical: No cervical adenopathy.   Skin:     General: Skin is warm and dry.   Neurological:      General: No focal deficit present.      Deep Tendon Reflexes: Reflexes are normal and symmetric.   Psychiatric:         Mood and Affect: Mood and affect normal.         Behavior: Behavior is cooperative.            Labs/Imaging  Results for ENMA HAMEED (MRN [id number]) as of 9/20/2021 15:00   Ref. Range 7/12/2021 12:38 7/12/2021 12:58 8/10/2021 00:00 9/9/2021 12:09 9/9/2021 14:42 9/9/2021 14:42 9/20/2021 14:20   Glucose Latest Ref Range: 70 - 130 mg/dL  191 (H)     224 (A)   Sodium Latest Ref Range: 136 - 145 mmol/L  137        Potassium Latest Ref Range: 3.5 - 5.2 mmol/L  4.6        CO2 Latest Ref Range: 22.0 - 29.0 mmol/L  24.4        Chloride Latest Ref Range: 98 - 107 mmol/L  101        Anion Gap Latest Ref Range: 5.0 - 15.0 mmol/L  11.6        Creatinine Latest Ref Range: 0.57 - 1.00 mg/dL  0.65        BUN Latest Ref Range: 8 - 23 mg/dL  14        BUN/Creatinine Ratio Latest Ref Range: 7.0 - 25.0   21.5        Calcium Latest Ref Range: 8.6 - 10.5 mg/dL  9.8        eGFR Non  Am Latest Ref Range: >60 mL/min/1.73  93         Alkaline Phosphatase Latest Ref Range: 39 - 117 U/L  45        Total Protein Latest Ref Range: 6.0 - 8.5 g/dL  7.6        ALT (SGPT) Latest Ref Range: 1 - 33 U/L  49 (H)        AST (SGOT) Latest Ref Range: 1 - 32 U/L  40 (H)        Total Bilirubin Latest Ref Range: 0.0 - 1.2 mg/dL  0.3        Albumin Latest Ref Range: 3.50 - 5.20 g/dL  4.50        Globulin Latest Units: gm/dL  3.1        A/G Ratio Latest Units: g/dL  1.5        Hemoglobin A1C Latest Units: % 10.1         TSH Baseline Latest Ref Range: 0.270 - 4.200 uIU/mL  0.639        Total Cholesterol Latest Ref Range: 0 - 200 mg/dL  235 (H)        HDL Cholesterol Latest Ref Range: 40 - 60 mg/dL  34 (L)        LDL Cholesterol  Latest Ref Range: 0 - 100 mg/dL  93        VLDL Cholesterol Latest Ref Range: 5 - 40 mg/dL  108 (H)        Triglycerides Latest Ref Range: 0 - 150 mg/dL  645 (H)        LDL/HDL Ratio Unknown  2.12            Assessment and Plan    Diagnoses and all orders for this visit:    1. Type 2 diabetes mellitus with hyperglycemia, without long-term current use of insulin (CMS/[facility]) (Primary)  -Uncontrolled with most recent hemoglobin A1c 10.1%, too soon to repeat today.  Patient hyperglycemic in office today.  -No home glucose data available for review  -Reviewed with patient that she is on a maximum dose of 3 oral agents and that given this I would recommend initiation of insulin therapy based on the degree of her hyperglycemia, patient agreeable.  -Reviewed potential adverse effects of insulin therapy, specifically hypoglycemia.  Reviewed signs and symptoms of hypoglycemia and its management.  -Start Lantus or formulary equivalent 10 units daily.  She will increase by 2 units every 5 days until fasting glucose less than 150, maximum 40 units  -Patient to continue Jardiance 25 mg  -Patient to continue glimepiride 4 mg twice  -Patient continue Metformin 1000 mg twice daily  -Patient was advised to monitor blood sugar daily.  Patient was instructed to  bring glucometer to all future appointments. Patient should contact the clinic between appointments with hypoglycemia or persistent hyperglycemia.  Discussed signs and symptoms of hypoglycemia as well as hypoglycemia management via the rule of 15's.  Discussed potential for long-term complications with uncontrolled diabetes including nephropathy, neuropathy, retinopathy, increased risk for cardiac disease.  Discussed the role of diet and exercise in the management of diabetes.  CMP up-to-date from July 2021, EGFR normal, AST/ALT mildly elevated  Lipid panel up-to-date from July 2021, triglycerides 645, LDL 93, HDL 34  Urine microalbumin due, ordered  Monofilament exam completed today  Eye exam up-to-date from approximately 6 months ago  -     POC Glucose, Blood    2. Hyperlipidemia, unspecified hyperlipidemia type  -Previously intolerant to high intensity statins  -Reviewed that hypertriglyceridemia will likely improve with improving glycemic control, discussed pancreatitis risk  -Currently taking Zetia 10 mg daily, pravastatin 40 mg daily, fenofibrate 145 mg daily    3. Hypothyroidism, unspecified type   -Thyroid function testing up-to-date from July 2021, continue levothyroxine 25 mcg daily       Return in about 3 months (around 12/20/2021). The patient was instructed to contact the clinic with any interval questions or concerns.    Sarah Amin MD     Please note that portions of this document were completed using a voice recognition program. Efforts were made to edit the dictations, but occasionally words are mis-transcribed.

## 2021-09-21 LAB
ALBUMIN SERPL-MCNC: 4.5 G/DL (ref 3.5–5.2)
ALBUMIN/GLOB SERPL: 1.6 G/DL
ALP SERPL-CCNC: 54 U/L (ref 39–117)
ALT SERPL W P-5'-P-CCNC: 41 U/L (ref 1–33)
ANION GAP SERPL CALCULATED.3IONS-SCNC: 13.5 MMOL/L (ref 5–15)
AST SERPL-CCNC: 32 U/L (ref 1–32)
BILIRUB SERPL-MCNC: 0.2 MG/DL (ref 0–1.2)
BUN SERPL-MCNC: 19 MG/DL (ref 8–23)
BUN/CREAT SERPL: 26 (ref 7–25)
CALCIUM SPEC-SCNC: 9.8 MG/DL (ref 8.6–10.5)
CHLORIDE SERPL-SCNC: 99 MMOL/L (ref 98–107)
CHOLEST SERPL-MCNC: 182 MG/DL (ref 0–200)
CO2 SERPL-SCNC: 25.5 MMOL/L (ref 22–29)
CREAT SERPL-MCNC: 0.73 MG/DL (ref 0.57–1)
GFR SERPL CREATININE-BSD FRML MDRD: 81 ML/MIN/1.73
GLOBULIN UR ELPH-MCNC: 2.9 GM/DL
GLUCOSE SERPL-MCNC: 211 MG/DL (ref 65–99)
HDLC SERPL-MCNC: 38 MG/DL (ref 40–60)
LDLC SERPL CALC-MCNC: 44 MG/DL (ref 0–100)
LDLC/HDLC SERPL: 0.04 {RATIO}
POTASSIUM SERPL-SCNC: 4 MMOL/L (ref 3.5–5.2)
PROT SERPL-MCNC: 7.4 G/DL (ref 6–8.5)
SODIUM SERPL-SCNC: 138 MMOL/L (ref 136–145)
TRIGL SERPL-MCNC: 712 MG/DL (ref 0–150)
VLDLC SERPL-MCNC: 100 MG/DL (ref 5–40)

## 2021-09-21 RX ORDER — LANCETS 33 GAUGE
1 EACH MISCELLANEOUS DAILY
Qty: 100 EACH | Refills: 3 | Status: SHIPPED | OUTPATIENT
Start: 2021-09-21

## 2021-09-21 NOTE — TELEPHONE ENCOUNTER
Per the phar Onetouch Ultra is not covered and needs to be changed to Accu-Chek. Last seen yesterday, 9-20-21 and next appt is 1-6-22.

## 2021-09-22 ENCOUNTER — TELEPHONE (OUTPATIENT)
Dept: CARDIOLOGY | Facility: CLINIC | Age: 61
End: 2021-09-22

## 2021-09-22 LAB
ALBUMIN/CREAT UR: 65 MG/G CREAT (ref 0–29)
CREAT UR-MCNC: 39.8 MG/DL
MICROALBUMIN UR-MCNC: 25.8 UG/ML

## 2021-09-22 NOTE — TELEPHONE ENCOUNTER
----- Message from Cesar Cerrato IV, MD sent at 9/22/2021  2:58 PM EDT -----  Make sure she is taking fenofibrate as previously prescribed.  Start Zetia 10 mg daily

## 2021-09-22 NOTE — TELEPHONE ENCOUNTER
Patient contacted to review labs and recommendations. Patient currently taking Fenofibrate and Zetia 10 mg daily.     Do you have any additional recommendations? Thanks.

## 2021-09-22 NOTE — TELEPHONE ENCOUNTER
She needs to avoid carbohydrates as her triglycerides are dangerously high despite medical treatment

## 2021-09-28 ENCOUNTER — HOSPITAL ENCOUNTER (OUTPATIENT)
Dept: CARDIOLOGY | Facility: HOSPITAL | Age: 61
Discharge: HOME | End: 2021-09-28
Admitting: INTERNAL MEDICINE

## 2021-09-28 VITALS
HEIGHT: 63 IN | WEIGHT: 162 LBS | DIASTOLIC BLOOD PRESSURE: 88 MMHG | BODY MASS INDEX: 28.7 KG/M2 | SYSTOLIC BLOOD PRESSURE: 140 MMHG

## 2021-09-28 DIAGNOSIS — I25.119 CORONARY ARTERY DISEASE INVOLVING NATIVE CORONARY ARTERY OF NATIVE HEART WITH ANGINA PECTORIS (HCC): ICD-10-CM

## 2021-09-28 PROCEDURE — 0 TECHNETIUM SESTAMIBI: Performed by: INTERNAL MEDICINE

## 2021-09-28 PROCEDURE — 93017 CV STRESS TEST TRACING ONLY: CPT

## 2021-09-28 PROCEDURE — 78452 HT MUSCLE IMAGE SPECT MULT: CPT | Performed by: INTERNAL MEDICINE

## 2021-09-28 PROCEDURE — A9500 TC99M SESTAMIBI: HCPCS | Performed by: INTERNAL MEDICINE

## 2021-09-28 PROCEDURE — 25010000002 REGADENOSON 0.4 MG/5ML SOLUTION: Performed by: INTERNAL MEDICINE

## 2021-09-28 PROCEDURE — 93018 CV STRESS TEST I&R ONLY: CPT | Performed by: INTERNAL MEDICINE

## 2021-09-28 PROCEDURE — 78452 HT MUSCLE IMAGE SPECT MULT: CPT

## 2021-09-28 RX ORDER — CAFFEINE CITRATE 20 MG/ML
60 SOLUTION INTRAVENOUS
Status: DISCONTINUED | OUTPATIENT
Start: 2021-09-28 | End: 2021-09-29 | Stop reason: HOSPADM

## 2021-09-28 RX ADMIN — TECHNETIUM TC 99M SESTAMIBI 1 DOSE: 1 INJECTION INTRAVENOUS at 13:05

## 2021-09-28 RX ADMIN — REGADENOSON 0.4 MG: 0.08 INJECTION, SOLUTION INTRAVENOUS at 13:02

## 2021-09-28 RX ADMIN — TECHNETIUM TC 99M SESTAMIBI 1 DOSE: 1 INJECTION INTRAVENOUS at 11:25

## 2021-09-28 RX ADMIN — CAFFEINE CITRATE 60 MG: 20 INJECTION, SOLUTION INTRAVENOUS at 13:02

## 2021-09-29 LAB
BH CV REST NUCLEAR ISOTOPE DOSE: 9.9 MCI
BH CV STRESS BP STAGE 2: NORMAL
BH CV STRESS BP STAGE 4: NORMAL
BH CV STRESS COMMENTS STAGE 1: NORMAL
BH CV STRESS DOSE REGADENOSON STAGE 1: 0.4
BH CV STRESS DURATION MIN STAGE 1: 1
BH CV STRESS DURATION MIN STAGE 2: 1
BH CV STRESS DURATION MIN STAGE 3: 1
BH CV STRESS DURATION MIN STAGE 4: 1
BH CV STRESS DURATION SEC STAGE 1: 0
BH CV STRESS DURATION SEC STAGE 2: 0
BH CV STRESS DURATION SEC STAGE 3: 0
BH CV STRESS DURATION SEC STAGE 4: 0
BH CV STRESS HR STAGE 1: 98
BH CV STRESS HR STAGE 2: 105
BH CV STRESS HR STAGE 3: 96
BH CV STRESS HR STAGE 4: 91
BH CV STRESS NUCLEAR ISOTOPE DOSE: 31.9 MCI
BH CV STRESS PROTOCOL 1: NORMAL
BH CV STRESS RECOVERY BP: NORMAL MMHG
BH CV STRESS RECOVERY HR: 83 BPM
BH CV STRESS STAGE 1: 1
BH CV STRESS STAGE 2: 2
BH CV STRESS STAGE 3: 3
BH CV STRESS STAGE 4: 4
LV EF NUC BP: 77 %
MAXIMAL PREDICTED HEART RATE: 159 BPM
PERCENT MAX PREDICTED HR: 66.67 %
STRESS BASELINE BP: NORMAL MMHG
STRESS BASELINE HR: 81 BPM
STRESS PERCENT HR: 78 %
STRESS POST PEAK BP: NORMAL MMHG
STRESS POST PEAK HR: 106 BPM
STRESS TARGET HR: 135 BPM

## 2021-10-15 ENCOUNTER — OFFICE VISIT (OUTPATIENT)
Dept: FAMILY MEDICINE CLINIC | Facility: CLINIC | Age: 61
End: 2021-10-15

## 2021-10-15 VITALS
SYSTOLIC BLOOD PRESSURE: 130 MMHG | TEMPERATURE: 98.3 F | BODY MASS INDEX: 29.16 KG/M2 | HEART RATE: 89 BPM | DIASTOLIC BLOOD PRESSURE: 72 MMHG | HEIGHT: 63 IN | OXYGEN SATURATION: 97 % | WEIGHT: 164.6 LBS

## 2021-10-15 DIAGNOSIS — I10 ESSENTIAL HYPERTENSION: Primary | Chronic | ICD-10-CM

## 2021-10-15 PROCEDURE — 99213 OFFICE O/P EST LOW 20 MIN: CPT | Performed by: FAMILY MEDICINE

## 2021-10-15 NOTE — PROGRESS NOTES
"Chief Complaint  Hypertension    Subjective          Christi Chapa presents to [facility] for   Has been following with Endo. Now on insulin.  Has recently had a stable Cardiac stress test. Follows with Cardiology.    Hypertension  This is a chronic problem. The current episode started more than 1 year ago. The problem is unchanged. The problem is controlled. Pertinent negatives include no anxiety, chest pain, malaise/fatigue, palpitations or shortness of breath. Risk factors for coronary artery disease include dyslipidemia, diabetes mellitus, obesity and post-menopausal state. Past treatments include ACE inhibitors. Current antihypertension treatment includes ACE inhibitors. The current treatment provides significant improvement. There are no compliance problems.        Objective   Vital Signs:   /72   Pulse 89   Temp 98.3 °F (36.8 °C)   Ht 158.8 cm (62.5\")   Wt 74.7 kg (164 lb 9.6 oz)   SpO2 97%   BMI 29.63 kg/m²       Review of Systems   Constitutional: Negative.  Negative for activity change, fatigue, fever, malaise/fatigue and unexpected weight change.   HENT: Negative.  Negative for congestion, sneezing and sore throat.    Eyes: Negative.  Negative for visual disturbance.   Respiratory: Negative.  Negative for cough, chest tightness, shortness of breath and wheezing.    Cardiovascular: Negative.  Negative for chest pain, palpitations and leg swelling.   Gastrointestinal: Negative.  Negative for abdominal distention, abdominal pain, blood in stool, constipation, diarrhea and nausea.   Endocrine: Negative.  Negative for cold intolerance and heat intolerance.   Genitourinary: Negative.  Negative for dysuria, frequency and urgency.   Musculoskeletal: Negative.  Negative for arthralgias and myalgias.   Skin: Negative.  Negative for rash.   Allergic/Immunologic: Negative.    Neurological: Negative.  Negative for dizziness, syncope and numbness.   Hematological: " Negative.  Negative for adenopathy.   Psychiatric/Behavioral: Negative.  Negative for suicidal ideas. The patient is not nervous/anxious.      Physical Exam  Vitals and nursing note reviewed.   Constitutional:       General: She is not in acute distress.     Appearance: She is well-developed. She is not diaphoretic.   HENT:      Right Ear: External ear normal.      Left Ear: External ear normal.      Nose: Nose normal.   Eyes:      Conjunctiva/sclera: Conjunctivae normal.      Pupils: Pupils are equal, round, and reactive to light.   Neck:      Thyroid: No thyromegaly.   Cardiovascular:      Rate and Rhythm: Normal rate and regular rhythm.      Heart sounds: Normal heart sounds. No murmur heard.      Pulmonary:      Effort: Pulmonary effort is normal. No respiratory distress.      Breath sounds: Normal breath sounds. No wheezing.   Abdominal:      General: Bowel sounds are normal. There is no distension.      Palpations: Abdomen is soft. There is no mass.      Tenderness: There is no abdominal tenderness. There is no guarding or rebound.      Hernia: No hernia is present.   Musculoskeletal:         General: No tenderness. Normal range of motion.      Cervical back: Normal range of motion and neck supple.   Lymphadenopathy:      Cervical: No cervical adenopathy.   Skin:     General: Skin is warm and dry.      Coloration: Skin is not pale.      Findings: No erythema or rash.   Neurological:      Mental Status: She is alert and oriented to person, place, and time.      Deep Tendon Reflexes: Reflexes are normal and symmetric.   Psychiatric:         Behavior: Behavior normal.         Thought Content: Thought content normal.         Judgment: Judgment normal.        Result Review :                 Assessment and Plan    Problem List Items Addressed This Visit        Cardiac and Vasculature    Essential hypertension - Primary (Chronic)      Continue current meds.  Continue follow up with specialists    Follow Up   Return  in about 3 months (around 1/15/2022).  Patient was given instructions and counseling regarding her condition or for health maintenance advice. Please see specific information pulled into the AVS if appropriate.

## 2021-11-11 DIAGNOSIS — E78.2 MIXED HYPERLIPIDEMIA: Chronic | ICD-10-CM

## 2021-11-11 RX ORDER — FENOFIBRATE 145 MG/1
TABLET, COATED ORAL
Qty: 30 TABLET | Refills: 3 | Status: SHIPPED | OUTPATIENT
Start: 2021-11-11 | End: 2022-03-14 | Stop reason: SDUPTHER

## 2021-11-17 RX ORDER — EZETIMIBE 10 MG/1
TABLET ORAL
Qty: 90 TABLET | Refills: 0 | Status: SHIPPED | OUTPATIENT
Start: 2021-11-17 | End: 2022-02-11

## 2021-11-19 ENCOUNTER — TELEPHONE (OUTPATIENT)
Dept: ENDOCRINOLOGY | Facility: CLINIC | Age: 61
End: 2021-11-19

## 2021-11-19 NOTE — TELEPHONE ENCOUNTER
PT CALLED STATING HER INSULINE IS NOT WORKING. SHE STATED THIS MORN HER BS WAS AROUND 160. SHE IS TAKING LANTUS. SHE REQUESTED A CALL BACK. PLEASE AND THANK YOU

## 2021-11-19 NOTE — TELEPHONE ENCOUNTER
Please contact patient, her reported sugars indicate a significant improvement in comparison to her most recent hemoglobin A1c.  She likely just requires further dose titration, please instruct her to increase Lantus to 36 units nightly.  Please advise patient that Lantus is a long-acting insulin I would not expect an acute drop in sugar after taking this medication.  Titration is to slowly to bring down glucose over time, not to acutely lower sugar.

## 2021-11-19 NOTE — TELEPHONE ENCOUNTER
Called the pt and her level was 160 fasting. She has not checked any more today. Last night it was 169 and she took 33 units of Lantus. I advised to drink water, stay away from sugary drink, sweets and bread, pasta and potatoes. Please advise.    She is not taking Abx or steroids

## 2021-11-23 ENCOUNTER — TELEPHONE (OUTPATIENT)
Dept: ENDOCRINOLOGY | Facility: CLINIC | Age: 61
End: 2021-11-23

## 2021-11-23 ENCOUNTER — OFFICE VISIT (OUTPATIENT)
Dept: FAMILY MEDICINE CLINIC | Facility: CLINIC | Age: 61
End: 2021-11-23

## 2021-11-23 VITALS
HEIGHT: 63 IN | RESPIRATION RATE: 17 BRPM | BODY MASS INDEX: 29.06 KG/M2 | WEIGHT: 164 LBS | DIASTOLIC BLOOD PRESSURE: 80 MMHG | OXYGEN SATURATION: 97 % | HEART RATE: 92 BPM | SYSTOLIC BLOOD PRESSURE: 130 MMHG | TEMPERATURE: 97 F

## 2021-11-23 DIAGNOSIS — R30.9 PAINFUL URINATION: ICD-10-CM

## 2021-11-23 DIAGNOSIS — N30.01 ACUTE CYSTITIS WITH HEMATURIA: Primary | ICD-10-CM

## 2021-11-23 LAB
BILIRUB BLD-MCNC: NEGATIVE MG/DL
CLARITY, POC: ABNORMAL
COLOR UR: YELLOW
EXPIRATION DATE: ABNORMAL
GLUCOSE UR STRIP-MCNC: NEGATIVE MG/DL
KETONES UR QL: NEGATIVE
LEUKOCYTE EST, POC: ABNORMAL
Lab: ABNORMAL
NITRITE UR-MCNC: POSITIVE MG/ML
PH UR: 5.5 [PH] (ref 5–8)
PROT UR STRIP-MCNC: NEGATIVE MG/DL
RBC # UR STRIP: ABNORMAL /UL
SP GR UR: 1.02 (ref 1–1.03)
UROBILINOGEN UR QL: NORMAL

## 2021-11-23 PROCEDURE — 87147 CULTURE TYPE IMMUNOLOGIC: CPT | Performed by: NURSE PRACTITIONER

## 2021-11-23 PROCEDURE — 81003 URINALYSIS AUTO W/O SCOPE: CPT | Performed by: NURSE PRACTITIONER

## 2021-11-23 PROCEDURE — 87086 URINE CULTURE/COLONY COUNT: CPT | Performed by: NURSE PRACTITIONER

## 2021-11-23 PROCEDURE — 99213 OFFICE O/P EST LOW 20 MIN: CPT | Performed by: NURSE PRACTITIONER

## 2021-11-23 RX ORDER — FLUCONAZOLE 150 MG/1
150 TABLET ORAL DAILY
Qty: 2 TABLET | Refills: 0 | Status: SHIPPED | OUTPATIENT
Start: 2021-11-23 | End: 2022-02-25 | Stop reason: SDUPTHER

## 2021-11-23 RX ORDER — PHENAZOPYRIDINE HYDROCHLORIDE 100 MG/1
100 TABLET, FILM COATED ORAL 3 TIMES DAILY PRN
Qty: 6 TABLET | Refills: 0 | Status: SHIPPED | OUTPATIENT
Start: 2021-11-23 | End: 2021-11-25

## 2021-11-23 RX ORDER — CIPROFLOXACIN 500 MG/1
500 TABLET, FILM COATED ORAL 2 TIMES DAILY
Qty: 14 TABLET | Refills: 0 | Status: SHIPPED | OUTPATIENT
Start: 2021-11-23 | End: 2022-01-06

## 2021-11-23 NOTE — ASSESSMENT & PLAN NOTE
Urinalysis abnormal in office   Treatment as directed  Culture sent  Start Pyridium PRN pain with urination as directed

## 2021-11-23 NOTE — PROGRESS NOTES
"Chief Complaint  Difficulty Urinating and Headache    Subjective          Christi Chapa presents to [facility]  Difficulty Urinating  This is a new problem. The current episode started in the past 7 days. The problem has been gradually worsening. Associated symptoms include abdominal pain, headaches and urinary symptoms. Pertinent negatives include no chills or fever. Associated symptoms comments: Dysuria, cloudy urine. Treatments tried: cranberry pills. The treatment provided no relief.   Headache   Associated symptoms include abdominal pain. Pertinent negatives include no fever.       Objective   Vital Signs:   /80   Pulse 92   Temp 97 °F (36.1 °C) (Infrared)   Resp 17   Ht 158.8 cm (62.5\")   Wt 74.4 kg (164 lb)   SpO2 97%   BMI 29.52 kg/m²     Physical Exam  Vitals and nursing note reviewed.   Constitutional:       General: She is not in acute distress.     Appearance: Normal appearance.   HENT:      Head: Normocephalic.      Right Ear: External ear normal.      Left Ear: External ear normal.      Nose: Nose normal.   Eyes:      Extraocular Movements: Extraocular movements intact.      Conjunctiva/sclera: Conjunctivae normal.      Pupils: Pupils are equal, round, and reactive to light.   Cardiovascular:      Rate and Rhythm: Normal rate and regular rhythm.      Heart sounds: Normal heart sounds.   Pulmonary:      Effort: Pulmonary effort is normal.      Breath sounds: Normal breath sounds.   Abdominal:      General: Bowel sounds are normal. There is no distension.      Palpations: Abdomen is soft.      Tenderness: There is no abdominal tenderness.   Musculoskeletal:      Right lower leg: No edema.      Left lower leg: No edema.   Skin:     General: Skin is warm and dry.   Neurological:      Mental Status: She is alert and oriented to person, place, and time.   Psychiatric:         Mood and Affect: Mood normal.         Behavior: Behavior normal.         Thought " Content: Thought content normal.         Judgment: Judgment normal.        Result Review :     UA    Urinalysis 11/23/21   Ketones, UA Negative   Leukocytes, UA Trace (A)   (A) Abnormal value                     Assessment and Plan    Diagnoses and all orders for this visit:    1. Painful urination (Primary)  -     POCT urinalysis dipstick, automated  -     phenazopyridine (Pyridium) 100 MG tablet; Take 1 tablet by mouth 3 (Three) Times a Day As Needed for Bladder Spasms for up to 2 days.  Dispense: 6 tablet; Refill: 0    2. Acute cystitis with hematuria  -     ciprofloxacin (Cipro) 500 MG tablet; Take 1 tablet by mouth 2 (Two) Times a Day.  Dispense: 14 tablet; Refill: 0  -     fluconazole (Diflucan) 150 MG tablet; Take 1 tablet by mouth Daily.  Dispense: 2 tablet; Refill: 0  -     phenazopyridine (Pyridium) 100 MG tablet; Take 1 tablet by mouth 3 (Three) Times a Day As Needed for Bladder Spasms for up to 2 days.  Dispense: 6 tablet; Refill: 0  -     Urine Culture - Urine, Urine, Clean Catch        Follow Up   No follow-ups on file.  Patient was given instructions and counseling regarding her condition or for health maintenance advice. Please see specific information pulled into the AVS if appropriate.

## 2021-11-23 NOTE — TELEPHONE ENCOUNTER
Called the pt she is calling in new numbers since call last week with dosage change. She was told last week to do Lantus 33 units. She has titrated to 38 units of lantus, not the 138 units. She would like to know if she needs to change anything. Please advise.

## 2021-11-23 NOTE — TELEPHONE ENCOUNTER
GLUCOSE LOG-ONCE DAILY, FASTING    11/20/2021  139  11/21/2021  132  11/23/2021  160    PATIENT REPORTS THAT SHE TITRATED UP  UNITS     CALL BACK [phone]

## 2021-11-24 LAB — BACTERIA SPEC AEROBE CULT: ABNORMAL

## 2021-12-04 DIAGNOSIS — N30.01 ACUTE CYSTITIS WITH HEMATURIA: Primary | ICD-10-CM

## 2021-12-04 RX ORDER — AMOXICILLIN AND CLAVULANATE POTASSIUM 875; 125 MG/1; MG/1
1 TABLET, FILM COATED ORAL 2 TIMES DAILY
Qty: 14 TABLET | Refills: 0 | Status: SHIPPED | OUTPATIENT
Start: 2021-12-04 | End: 2021-12-11

## 2021-12-10 DIAGNOSIS — I10 ESSENTIAL HYPERTENSION: Chronic | ICD-10-CM

## 2021-12-10 RX ORDER — LISINOPRIL 2.5 MG/1
2.5 TABLET ORAL DAILY
Qty: 30 TABLET | Refills: 3 | Status: SHIPPED | OUTPATIENT
Start: 2021-12-10 | End: 2022-11-02 | Stop reason: SDUPTHER

## 2021-12-10 RX ORDER — LEVOTHYROXINE SODIUM 0.03 MG/1
25 TABLET ORAL DAILY
Qty: 30 TABLET | Refills: 3 | Status: SHIPPED | OUTPATIENT
Start: 2021-12-10 | End: 2022-11-04 | Stop reason: SDUPTHER

## 2021-12-10 NOTE — TELEPHONE ENCOUNTER
Caller: Sammi Christi Carlotta    Relationship: Self    Best call back number: [phone]    Requested Prescriptions:   Requested Prescriptions     Pending Prescriptions Disp Refills   • lisinopril (PRINIVIL,ZESTRIL) 2.5 MG tablet 30 tablet 3     Sig: Take 1 tablet by mouth Daily.   • levothyroxine (SYNTHROID, LEVOTHROID) 25 MCG tablet 30 tablet 3     Sig: Take 1 tablet by mouth Daily.        Pharm[facility] where request should be sent[facility] [address] [facility] [phone] [facility] [phone]      Additional details provided by patient: PATIENT REQUESTED A 90 DAY SUPPLY OF THESE MEDICATIONS. PATIENT STATED SHE IS GOING TO BE OUT OF TOWN FOR 17 DAYS AND DOES NOT HAVE ENOUGH MEDICATION TO MAKE IT UNTIL SHE RETURNS    Does the patient have less than a 3 day supply:  [] Yes  [x] No    Arnaldo Perea Rep   12/10/21 13:21 EST

## 2022-01-06 ENCOUNTER — OFFICE VISIT (OUTPATIENT)
Dept: ENDOCRINOLOGY | Facility: CLINIC | Age: 62
End: 2022-01-06

## 2022-01-06 VITALS
HEART RATE: 84 BPM | HEIGHT: 63 IN | SYSTOLIC BLOOD PRESSURE: 146 MMHG | DIASTOLIC BLOOD PRESSURE: 76 MMHG | OXYGEN SATURATION: 95 % | WEIGHT: 163 LBS | BODY MASS INDEX: 28.88 KG/M2

## 2022-01-06 DIAGNOSIS — Z79.4 TYPE 2 DIABETES MELLITUS WITH HYPERGLYCEMIA, WITH LONG-TERM CURRENT USE OF INSULIN: Primary | ICD-10-CM

## 2022-01-06 DIAGNOSIS — E78.5 HYPERLIPIDEMIA, UNSPECIFIED HYPERLIPIDEMIA TYPE: ICD-10-CM

## 2022-01-06 DIAGNOSIS — E11.65 TYPE 2 DIABETES MELLITUS WITH HYPERGLYCEMIA, WITH LONG-TERM CURRENT USE OF INSULIN: Primary | ICD-10-CM

## 2022-01-06 LAB
EXPIRATION DATE: ABNORMAL
EXPIRATION DATE: NORMAL
GLUCOSE BLDC GLUCOMTR-MCNC: 208 MG/DL (ref 70–130)
HBA1C MFR BLD: 7.4 %
Lab: ABNORMAL
Lab: NORMAL

## 2022-01-06 PROCEDURE — 82947 ASSAY GLUCOSE BLOOD QUANT: CPT | Performed by: INTERNAL MEDICINE

## 2022-01-06 PROCEDURE — 99214 OFFICE O/P EST MOD 30 MIN: CPT | Performed by: INTERNAL MEDICINE

## 2022-01-06 PROCEDURE — 3051F HG A1C>EQUAL 7.0%<8.0%: CPT | Performed by: INTERNAL MEDICINE

## 2022-01-06 PROCEDURE — 83036 HEMOGLOBIN GLYCOSYLATED A1C: CPT | Performed by: INTERNAL MEDICINE

## 2022-01-06 RX ORDER — GLIMEPIRIDE 4 MG/1
4 TABLET ORAL 2 TIMES DAILY
Qty: 180 TABLET | Refills: 1 | Status: SHIPPED | OUTPATIENT
Start: 2022-01-06 | End: 2022-08-03 | Stop reason: SDUPTHER

## 2022-01-06 NOTE — PROGRESS NOTES
"Chief Complaint   Patient presents with   • Diabetes        HPI   Christi Chapa is a 61 y.o. female had concerns including Diabetes.      Patient denies any significant health changes in her last visit.  She has had a cold for the last several days and that sugars have been running higher.  She is monitoring blood sugar on average once daily with values ranging 116-208, average 156 in the past 2 weeks.  She does report that she had some symptoms concerning for hypoglycemia after she took her morning medications and did not eat for several hours.  She reports this was a single occurrence but was not able to monitor blood sugar at the time.    Current diabetes medications include the following:  Lantus 38 units once daily  Jardiance 25 mg daily  Glimepiride 4 mg twice daily  Metformin 1000 mg twice daily    Patient continues on pravastatin 40 mg daily, fenofibrate 145 mg daily, zetia 10 mg daily for hyperlipidemia.  She denies myalgias or abdominal pain.    The following portions of the patient's history were reviewed and updated as appropriate: allergies, current medications and past social history.    Review of Systems   Gastrointestinal: Negative for abdominal pain, nausea and vomiting.   Endocrine: Negative for polydipsia and polyuria.   Musculoskeletal: Negative for myalgias.      /76 (BP Location: Right arm, Patient Position: Sitting, Cuff Size: Adult)   Pulse 84   Ht 158.8 cm (62.5\")   Wt 73.9 kg (163 lb)   SpO2 95%   BMI 29.34 kg/m²      Physical Exam      Constitutional:  well developed; well nourished  no acute distress  appears stated age   ENT/Thyroid: not examined   Eyes: EOM intact  Conjunctiva: clear   Respiratory:  breathing is unlabored  clear to auscultation bilaterally   Cardiovascular:  regular rate and rhythm   Chest:  Not performed.   Abdomen: soft, non-tender; no masses   : Not performed.   Musculoskeletal: Not performed   Skin: not performed.   Neuro: mental status, speech " normal   Psych: mood and affect are within normal limits       Labs/Imaging  Results for ENMA HAMEED (MRN [id number]) as of 1/6/2022 13:05   Ref. Range 1/6/2022 13:03 1/6/2022 13:03   Glucose Latest Ref Range: 70 - 130 mg/dL 208 (A)    Hemoglobin A1C Latest Units: %  7.4       Diagnoses and all orders for this visit:    1. Type 2 diabetes mellitus with hyperglycemia, with long-term current use of insulin (HCC) (Primary)  Uncontrolled with hemoglobin A1C  Plan to increase Lantus to 40 units daily  Plan to continue metformin 1000 g twice a day  Plan to continue Jardiance 25 mg daily  Patient was advised to monitor blood sugar 2 times daily.  Patient was instructed to bring glucometer to all future appointments. Patient should contact the clinic between appointments with hypoglycemia or persistent hyperglycemia.  Discussed signs and symptoms of hypoglycemia as well as hypoglycemia management via the rule of 15's.  Discussed potential for long-term complications with uncontrolled diabetes including nephropathy, neuropathy, retinopathy, increased risk for cardiac disease.  Discussed the role of diet and exercise in the management of diabetes.  Monofilament exam completed September 2021  Lipids up-to-date from September 2021, triglycerides 112, LDL 44  See MDM today for September 2021, EGFR 81  -     POC Glucose, Blood  -     POC Glycosylated Hemoglobin (Hb A1C)  -     metFORMIN (GLUCOPHAGE) 1000 MG tablet; Take 1 tablet by mouth 2 (Two) Times a Day With Meals.  Dispense: 180 tablet; Refill: 1  -     empagliflozin (Jardiance) 25 MG tablet tablet; Take 1 tablet by mouth Daily.  Dispense: 90 tablet; Refill: 1    2. Hyperlipidemia, unspecified hyperlipidemia type  -LDL at goal with Zetia 10 mg daily, fenofibrate 145 mg, pravastatin 40 mg  -Patient with significant hypertriglyceridemia on most recent labs  -Discussed that regular improved with improving glycemic control, plan to repeat labs next visit    Other  orders  -     glimepiride (AMARYL) 4 MG tablet; Take 1 tablet by mouth 2 (Two) Times a Day.  Dispense: 180 tablet; Refill: 1  -     Insulin Glargine (LANTUS SOLOSTAR) 100 UNIT/ML injection pen; 40 units daily  Dispense: 36 mL; Refill: 1      Return in about 3 months (around 4/6/2022). The patient was instructed to contact the clinic with any interval questions or concerns.    Sarah Amin MD   Endocrinologist    Dictated Utilizing Dragon Dictation

## 2022-01-12 ENCOUNTER — OFFICE VISIT (OUTPATIENT)
Dept: FAMILY MEDICINE CLINIC | Facility: CLINIC | Age: 62
End: 2022-01-12

## 2022-01-12 ENCOUNTER — LAB (OUTPATIENT)
Dept: LAB | Facility: HOSPITAL | Age: 62
End: 2022-01-12

## 2022-01-12 VITALS
RESPIRATION RATE: 20 BRPM | DIASTOLIC BLOOD PRESSURE: 72 MMHG | HEART RATE: 86 BPM | OXYGEN SATURATION: 96 % | TEMPERATURE: 97 F | SYSTOLIC BLOOD PRESSURE: 120 MMHG | HEIGHT: 63 IN | BODY MASS INDEX: 29.59 KG/M2 | WEIGHT: 167 LBS

## 2022-01-12 DIAGNOSIS — J03.90 TONSILLITIS: Primary | ICD-10-CM

## 2022-01-12 DIAGNOSIS — E78.2 MIXED HYPERLIPIDEMIA: ICD-10-CM

## 2022-01-12 DIAGNOSIS — R09.81 NASAL SINUS CONGESTION: ICD-10-CM

## 2022-01-12 DIAGNOSIS — J02.9 SORE THROAT: ICD-10-CM

## 2022-01-12 LAB
CHOLEST SERPL-MCNC: 166 MG/DL (ref 0–200)
HDLC SERPL-MCNC: 42 MG/DL (ref 40–60)
LDLC SERPL CALC-MCNC: 80 MG/DL (ref 0–100)
LDLC/HDLC SERPL: 1.69 {RATIO}
TRIGL SERPL-MCNC: 266 MG/DL (ref 0–150)
VLDLC SERPL-MCNC: 44 MG/DL (ref 5–40)

## 2022-01-12 PROCEDURE — 99213 OFFICE O/P EST LOW 20 MIN: CPT | Performed by: NURSE PRACTITIONER

## 2022-01-12 PROCEDURE — 80061 LIPID PANEL: CPT | Performed by: NURSE PRACTITIONER

## 2022-01-12 PROCEDURE — 36415 COLL VENOUS BLD VENIPUNCTURE: CPT

## 2022-01-12 PROCEDURE — U0004 COV-19 TEST NON-CDC HGH THRU: HCPCS | Performed by: NURSE PRACTITIONER

## 2022-01-12 RX ORDER — AMOXICILLIN 875 MG/1
875 TABLET, COATED ORAL EVERY 12 HOURS SCHEDULED
Qty: 20 TABLET | Refills: 0 | Status: SHIPPED | OUTPATIENT
Start: 2022-01-12 | End: 2022-01-22

## 2022-01-12 NOTE — ASSESSMENT & PLAN NOTE
Saline nasal spray as directed  COVID PCR performed in office and patient instructed to remain quarantine until results return

## 2022-01-12 NOTE — PROGRESS NOTES
"Answers for HPI/ROS submitted by the patient on 1/11/2022  What is the primary reason for your visit?: Sore Throat  Chronicity: new  Onset: in the past 7 days  Progression since onset: waxing and waning  Pain worse on: left  Fever: no fever  Pain - numeric: 5/10  abdominal pain: No  congestion: Yes  cough: Yes  diarrhea: No  ear discharge: Yes  ear pain: Yes  headaches: Yes  hoarse voice: Yes  neck pain: Yes  plugged ear sensation: Yes  shortness of breath: No  stridor: No  swollen glands: Yes  trouble swallowing: Yes  vomiting: No  strep: No  mono: No    Chief Complaint  URI    Subjective          Christi Chapa presents to [facility]  URI   This is a new problem. The current episode started in the past 7 days. The problem has been gradually worsening. There has been no fever. Associated symptoms include congestion, coughing, ear pain, headaches, neck pain, a plugged ear sensation, a sore throat and swollen glands. Pertinent negatives include no abdominal pain, diarrhea or vomiting. She has tried decongestant for the symptoms. The treatment provided mild relief.   Sore Throat   This is a new problem. The current episode started in the past 7 days. The problem has been waxing and waning. The pain is worse on the left side. There has been no fever. The pain is at a severity of 5/10. Associated symptoms include congestion, coughing, ear discharge, ear pain, headaches, a hoarse voice, a plugged ear sensation, neck pain, swollen glands and trouble swallowing. Pertinent negatives include no abdominal pain, diarrhea, shortness of breath, stridor or vomiting. She has had no exposure to strep or mono.       Objective   Vital Signs:   /72   Pulse 86   Temp 97 °F (36.1 °C)   Resp 20   Ht 158.8 cm (62.5\")   Wt 75.8 kg (167 lb)   SpO2 96%   BMI 30.06 kg/m²     Physical Exam  Vitals and nursing note reviewed.   Constitutional:       General: She is not in acute distress.     " Appearance: Normal appearance.   HENT:      Head: Normocephalic.      Right Ear: Tympanic membrane, ear canal and external ear normal.      Left Ear: Tympanic membrane, ear canal and external ear normal.      Nose: Congestion present.      Mouth/Throat:      Mouth: Mucous membranes are moist.      Pharynx: Uvula midline. Pharyngeal swelling and posterior oropharyngeal erythema present. No oropharyngeal exudate or uvula swelling.      Tonsils: No tonsillar exudate or tonsillar abscesses.   Eyes:      Extraocular Movements: Extraocular movements intact.      Conjunctiva/sclera: Conjunctivae normal.      Pupils: Pupils are equal, round, and reactive to light.   Neck:      Vascular: No carotid bruit.   Cardiovascular:      Rate and Rhythm: Regular rhythm.      Heart sounds: Normal heart sounds.   Pulmonary:      Effort: Pulmonary effort is normal. No respiratory distress.      Breath sounds: Normal breath sounds. No wheezing, rhonchi or rales.   Abdominal:      General: Bowel sounds are normal. There is no distension.      Palpations: Abdomen is soft.   Musculoskeletal:      Cervical back: Normal range of motion. No rigidity or tenderness.      Right lower leg: No edema.      Left lower leg: No edema.   Lymphadenopathy:      Cervical: Cervical adenopathy present.      Left cervical: Superficial cervical adenopathy present.   Skin:     General: Skin is warm and dry.      Findings: No rash.   Neurological:      Mental Status: She is alert and oriented to person, place, and time.   Psychiatric:         Mood and Affect: Mood normal.         Behavior: Behavior normal.         Thought Content: Thought content normal.         Judgment: Judgment normal.                     Assessment and Plan    Diagnoses and all orders for this visit:    1. Tonsillitis (Primary)  Assessment & Plan:  Take medications as directed  There is no available strep test in office to perform testing at this time  Drink plenty of fluids  Discard  toothbrush after 24 hours antibiotic  Tylenol/Motrin PRN as directed  RTO or call PRN persistent or worsening symptoms      Orders:  -     amoxicillin (AMOXIL) 875 MG tablet; Take 1 tablet by mouth Every 12 (Twelve) Hours for 10 days.  Dispense: 20 tablet; Refill: 0    2. Mixed hyperlipidemia  Assessment & Plan:  Lipid abnormalities are stable.  Nutritional counseling was provided. and patient requested lipid panel as she is fasting so cardiologist could also see results upcoming.  Lipids will be reassessed at next scheduled visit. Cardiology appt. upcoming..    Orders:  -     Lipid panel; Future    3. Sore throat  Assessment & Plan:  Gargle warm saline water PRN as directed  Drink plenty of fluids      Orders:  -     COVID-19 PCR, [facility], NP SWAB IN [facility] TRANSPORT MEDIA/ORAL SWISH 24-30 HR TAT - Swab, Nasopharynx; Future    4. Nasal sinus congestion  Assessment & Plan:  Saline nasal spray as directed  COVID PCR performed in office and patient instructed to remain quarantine until results return    Orders:  -     COVID-19 PCR, [facility], NP SWAB IN [facility] TRANSPORT MEDIA/ORAL SWISH 24-30 HR TAT - Swab, Nasopharynx; Future      Follow Up   Return if symptoms worsen or fail to improve.  Patient was given instructions and counseling regarding her condition or for health maintenance advice. Please see specific information pulled into the AVS if appropriate.

## 2022-01-12 NOTE — ASSESSMENT & PLAN NOTE
Lipid abnormalities are stable.  Nutritional counseling was provided. and patient requested lipid panel as she is fasting so cardiologist could also see results upcoming.  Lipids will be reassessed at next scheduled visit. Cardiology appt. upcoming..

## 2022-01-12 NOTE — ASSESSMENT & PLAN NOTE
Take medications as directed  There is no available strep test in office to perform testing at this time  Drink plenty of fluids  Discard toothbrush after 24 hours antibiotic  Tylenol/Motrin PRN as directed  RTO or call PRN persistent or worsening symptoms

## 2022-01-13 LAB — SARS-COV-2 RNA NOSE QL NAA+PROBE: DETECTED

## 2022-02-11 RX ORDER — EZETIMIBE 10 MG/1
TABLET ORAL
Qty: 90 TABLET | Refills: 3 | Status: SHIPPED | OUTPATIENT
Start: 2022-02-11 | End: 2022-08-16 | Stop reason: SDUPTHER

## 2022-02-11 RX ORDER — PRAVASTATIN SODIUM 40 MG
40 TABLET ORAL DAILY
Qty: 90 TABLET | Refills: 0 | Status: SHIPPED | OUTPATIENT
Start: 2022-02-11 | End: 2022-05-10 | Stop reason: SDUPTHER

## 2022-02-11 NOTE — TELEPHONE ENCOUNTER
Caller: Christi Chapan    Relationship: Self    Best call back number: [phone]   Requested Prescriptions:   Requested Prescriptions     Pending Prescriptions Disp Refills   • pravastatin (Pravachol) 40 MG tablet 90 tablet 0     Sig: Take 1 tablet by mouth Daily.        Pharmacy where request should be sent: [facility] [address] - [phone]  - [phone]     Additional details provided by patient: PATIENT HAS A FEW PILLS LEFT.  PATIENT ASKED FOR A 90 DAY REFILL.    Does the patient have less than a 3 day supply:  [] Yes  [x] No    Arnaldo Flower Rep   02/11/22 09:53 EST         DELETE AFTER READING TO PATIENT: “Thank you for sharing this information with me. I will send a message to the clinical team. Please allow 48 hours for the clinical staff to follow up on this request.”

## 2022-02-25 ENCOUNTER — LAB (OUTPATIENT)
Dept: LAB | Facility: HOSPITAL | Age: 62
End: 2022-02-25

## 2022-02-25 ENCOUNTER — OFFICE VISIT (OUTPATIENT)
Dept: FAMILY MEDICINE CLINIC | Facility: CLINIC | Age: 62
End: 2022-02-25

## 2022-02-25 VITALS
HEART RATE: 91 BPM | WEIGHT: 171 LBS | TEMPERATURE: 98.7 F | DIASTOLIC BLOOD PRESSURE: 62 MMHG | OXYGEN SATURATION: 96 % | RESPIRATION RATE: 18 BRPM | BODY MASS INDEX: 30.3 KG/M2 | SYSTOLIC BLOOD PRESSURE: 138 MMHG | HEIGHT: 63 IN

## 2022-02-25 DIAGNOSIS — R35.0 URINARY FREQUENCY: ICD-10-CM

## 2022-02-25 DIAGNOSIS — N30.01 ACUTE CYSTITIS WITH HEMATURIA: Primary | ICD-10-CM

## 2022-02-25 DIAGNOSIS — B37.31 VAGINAL YEAST INFECTION: ICD-10-CM

## 2022-02-25 DIAGNOSIS — N30.01 ACUTE HEMORRHAGIC CYSTITIS: Primary | ICD-10-CM

## 2022-02-25 LAB
BILIRUB BLD-MCNC: NEGATIVE MG/DL
CLARITY, POC: ABNORMAL
COLOR UR: YELLOW
EXPIRATION DATE: ABNORMAL
GLUCOSE UR STRIP-MCNC: NEGATIVE MG/DL
KETONES UR QL: NEGATIVE
LEUKOCYTE EST, POC: ABNORMAL
Lab: ABNORMAL
NITRITE UR-MCNC: NEGATIVE MG/ML
PH UR: 5.5 [PH] (ref 5–8)
PROT UR STRIP-MCNC: NEGATIVE MG/DL
RBC # UR STRIP: ABNORMAL /UL
SP GR UR: 1.01 (ref 1–1.03)
UROBILINOGEN UR QL: NORMAL

## 2022-02-25 PROCEDURE — 99213 OFFICE O/P EST LOW 20 MIN: CPT | Performed by: NURSE PRACTITIONER

## 2022-02-25 PROCEDURE — 87086 URINE CULTURE/COLONY COUNT: CPT

## 2022-02-25 PROCEDURE — 87147 CULTURE TYPE IMMUNOLOGIC: CPT

## 2022-02-25 PROCEDURE — 81003 URINALYSIS AUTO W/O SCOPE: CPT | Performed by: NURSE PRACTITIONER

## 2022-02-25 RX ORDER — AMOXICILLIN AND CLAVULANATE POTASSIUM 875; 125 MG/1; MG/1
1 TABLET, FILM COATED ORAL 2 TIMES DAILY
Qty: 14 TABLET | Refills: 0 | Status: SHIPPED | OUTPATIENT
Start: 2022-02-25 | End: 2022-03-04

## 2022-02-25 RX ORDER — NYSTATIN 100000 U/G
1 CREAM TOPICAL 2 TIMES DAILY
Qty: 30 G | Refills: 0 | Status: SHIPPED | OUTPATIENT
Start: 2022-02-25 | End: 2022-07-13

## 2022-02-25 RX ORDER — FLUCONAZOLE 150 MG/1
150 TABLET ORAL DAILY
Qty: 2 TABLET | Refills: 0 | Status: SHIPPED | OUTPATIENT
Start: 2022-02-25 | End: 2022-04-28

## 2022-02-26 LAB — BACTERIA SPEC AEROBE CULT: ABNORMAL

## 2022-03-09 ENCOUNTER — OFFICE VISIT (OUTPATIENT)
Dept: FAMILY MEDICINE CLINIC | Facility: CLINIC | Age: 62
End: 2022-03-09

## 2022-03-09 VITALS
HEIGHT: 62 IN | RESPIRATION RATE: 18 BRPM | BODY MASS INDEX: 31.1 KG/M2 | WEIGHT: 169 LBS | OXYGEN SATURATION: 98 % | DIASTOLIC BLOOD PRESSURE: 88 MMHG | TEMPERATURE: 98.6 F | SYSTOLIC BLOOD PRESSURE: 128 MMHG | HEART RATE: 92 BPM

## 2022-03-09 DIAGNOSIS — R42 DIZZINESS: ICD-10-CM

## 2022-03-09 DIAGNOSIS — H66.003 NON-RECURRENT ACUTE SUPPURATIVE OTITIS MEDIA OF BOTH EARS WITHOUT SPONTANEOUS RUPTURE OF TYMPANIC MEMBRANES: Primary | ICD-10-CM

## 2022-03-09 PROCEDURE — 99213 OFFICE O/P EST LOW 20 MIN: CPT | Performed by: NURSE PRACTITIONER

## 2022-03-09 RX ORDER — MECLIZINE HYDROCHLORIDE 25 MG/1
25 TABLET ORAL 3 TIMES DAILY PRN
Status: DISCONTINUED | OUTPATIENT
Start: 2022-03-09 | End: 2022-03-18

## 2022-03-09 RX ORDER — DOXYCYCLINE 100 MG/1
100 TABLET ORAL 2 TIMES DAILY
Qty: 20 TABLET | Refills: 0 | Status: SHIPPED | OUTPATIENT
Start: 2022-03-09 | End: 2022-03-19

## 2022-03-09 NOTE — PROGRESS NOTES
"Chief Complaint  Dizziness    Subjective          Christi Chapa presents to [facility]  Reports dizziness when she lays down and when lying in bed ad turns over everything spins. States she is having a frontal headache that started a few days also. Has not taken anything and has had no relief. Has not taken anything for dizziness. Reports history of concussion 2 years ago and that is when dizziness started. Explains she had another instance of dizziness last year after neck adjustment by chiropractor that eventually subsided.       Objective   Vital Signs:   /88   Pulse 92   Temp 98.6 °F (37 °C)   Resp 18   Ht 157.5 cm (62\")   Wt 76.7 kg (169 lb)   SpO2 98%   BMI 30.91 kg/m²     Physical Exam  Vitals and nursing note reviewed.   Constitutional:       General: She is not in acute distress.     Appearance: Normal appearance.   HENT:      Head: Normocephalic.      Right Ear: External ear normal. No drainage. A middle ear effusion is present. Tympanic membrane is bulging.      Left Ear: External ear normal. No drainage. A middle ear effusion is present. Tympanic membrane is bulging.      Nose: Nose normal.   Eyes:      Extraocular Movements: Extraocular movements intact.      Conjunctiva/sclera: Conjunctivae normal.      Pupils: Pupils are equal, round, and reactive to light.   Neck:      Vascular: No carotid bruit.   Cardiovascular:      Rate and Rhythm: Normal rate and regular rhythm.      Heart sounds: Normal heart sounds. No murmur heard.    No gallop.   Pulmonary:      Effort: Pulmonary effort is normal.      Breath sounds: Normal breath sounds.   Musculoskeletal:      Cervical back: Normal range of motion.      Right lower leg: No edema.      Left lower leg: No edema.   Skin:     General: Skin is warm and dry.   Neurological:      Mental Status: She is alert and oriented to person, place, and time.      Cranial Nerves: No cranial nerve deficit.      Sensory: No " sensory deficit.      Motor: No weakness.      Coordination: Coordination normal.      Gait: Gait normal.      Deep Tendon Reflexes: Reflexes normal.   Psychiatric:         Mood and Affect: Mood normal.         Behavior: Behavior normal.         Thought Content: Thought content normal.         Judgment: Judgment normal.        Result Review :                 Assessment and Plan    Diagnoses and all orders for this visit:    1. Non-recurrent acute suppurative otitis media of both ears without spontaneous rupture of tympanic membranes (Primary)  Assessment & Plan:  Take medications as directed  Drink plenty of fluids  Keep ears dry  Tylenol/Motrin PRN as directed  RTO or call PRN persistent or worsening symptoms      Orders:  -     doxycycline (ADOXA) 100 MG tablet; Take 1 tablet by mouth 2 (Two) Times a Day for 10 days.  Dispense: 20 tablet; Refill: 0    2. Dizziness  -     Discontinue: meclizine (ANTIVERT) tablet 25 mg      Follow Up   Return if symptoms worsen or fail to improve.  Patient was given instructions and counseling regarding her condition or for health maintenance advice. Please see specific information pulled into the AVS if appropriate.

## 2022-03-14 DIAGNOSIS — E78.2 MIXED HYPERLIPIDEMIA: Chronic | ICD-10-CM

## 2022-03-14 RX ORDER — FENOFIBRATE 145 MG/1
145 TABLET, COATED ORAL DAILY
Qty: 30 TABLET | Refills: 3 | Status: SHIPPED | OUTPATIENT
Start: 2022-03-14 | End: 2022-07-07

## 2022-03-16 ENCOUNTER — TELEPHONE (OUTPATIENT)
Dept: FAMILY MEDICINE CLINIC | Facility: CLINIC | Age: 62
End: 2022-03-16

## 2022-03-16 RX ORDER — MIRTAZAPINE 15 MG/1
15 TABLET, FILM COATED ORAL NIGHTLY
Qty: 30 TABLET | Refills: 1 | Status: SHIPPED | OUTPATIENT
Start: 2022-03-16

## 2022-03-16 NOTE — TELEPHONE ENCOUNTER
Pt stated the pharmacy never received her meclizine HCI 25. She is requesting that it be sent to the pharmacy and receive a call back.

## 2022-03-18 ENCOUNTER — OFFICE VISIT (OUTPATIENT)
Dept: FAMILY MEDICINE CLINIC | Facility: CLINIC | Age: 62
End: 2022-03-18

## 2022-03-18 VITALS
HEART RATE: 89 BPM | TEMPERATURE: 97.7 F | DIASTOLIC BLOOD PRESSURE: 70 MMHG | OXYGEN SATURATION: 94 % | RESPIRATION RATE: 14 BRPM | HEIGHT: 62 IN | WEIGHT: 171.8 LBS | SYSTOLIC BLOOD PRESSURE: 130 MMHG | BODY MASS INDEX: 31.62 KG/M2

## 2022-03-18 DIAGNOSIS — R42 VERTIGO: Primary | ICD-10-CM

## 2022-03-18 PROCEDURE — 99213 OFFICE O/P EST LOW 20 MIN: CPT | Performed by: STUDENT IN AN ORGANIZED HEALTH CARE EDUCATION/TRAINING PROGRAM

## 2022-03-18 RX ORDER — MECLIZINE HYDROCHLORIDE 25 MG/1
25 TABLET ORAL 3 TIMES DAILY PRN
Qty: 30 TABLET | Refills: 0 | Status: SHIPPED | OUTPATIENT
Start: 2022-03-18 | End: 2022-05-04

## 2022-03-18 NOTE — PROGRESS NOTES
"Chief Complaint  Earache (Left ear feels worse than right. Been feeling dizzy. Still having symptoms from the last visit on March 9. )    History of Present Illness     She is still having dizziness since having ear infection. She was given antibiotic and nose spray. She says she was supposed to get antivert but it did not come through. Says she has tried dramamine which has helped.   =Says it feels like the room is spinning which is worse when moving her head. No cp palpitations or syncope. No sob. No tinnitus or hearing loss. No nausea or vomiting. No headache    The following portions of the patient's history were reviewed and updated as appropriate: allergies, current medications, past family history, past medical history, past social history, past surgical history, and problem list.    OBJECTIVE:  /70   Pulse 89   Temp 97.7 °F (36.5 °C)   Resp 14   Ht 157.5 cm (62\")   Wt 77.9 kg (171 lb 12.8 oz)   SpO2 94%   BMI 31.42 kg/m²       Physical Exam  Constitutional:       General: She is not in acute distress.     Appearance: Normal appearance.   HENT:      Head: Normocephalic and atraumatic.   Eyes:      Extraocular Movements: Extraocular movements intact.   Cardiovascular:      Rate and Rhythm: Normal rate and regular rhythm.      Heart sounds: No murmur heard.  Pulmonary:      Effort: Pulmonary effort is normal. No respiratory distress.      Breath sounds: Normal breath sounds.   Abdominal:      General: Abdomen is flat.   Musculoskeletal:         General: No swelling.      Cervical back: Normal range of motion.   Skin:     Findings: No rash.   Neurological:      General: No focal deficit present.      Mental Status: She is alert.      Comments: No nystagmus noted. Normal gait. No focal deficit   Psychiatric:         Mood and Affect: Mood normal.                    Assessment and Plan   Diagnoses and all orders for this visit:    1. Vertigo (Primary)  -     Ambulatory Referral to Physical Therapy " Vestibular    Other orders  -     meclizine (ANTIVERT) 25 MG tablet; Take 1 tablet by mouth 3 (Three) Times a Day As Needed for Dizziness.  Dispense: 30 tablet; Refill: 0          Return in about 2 weeks (around 4/1/2022).       Meli Oleary D.O.  [facility]

## 2022-03-21 ENCOUNTER — APPOINTMENT (OUTPATIENT)
Dept: CT IMAGING | Facility: HOSPITAL | Age: 62
End: 2022-03-21

## 2022-03-21 ENCOUNTER — HOSPITAL ENCOUNTER (EMERGENCY)
Facility: HOSPITAL | Age: 62
Discharge: HOME | End: 2022-03-21
Attending: EMERGENCY MEDICINE | Admitting: EMERGENCY MEDICINE

## 2022-03-21 VITALS
SYSTOLIC BLOOD PRESSURE: 133 MMHG | OXYGEN SATURATION: 98 % | DIASTOLIC BLOOD PRESSURE: 56 MMHG | BODY MASS INDEX: 29.23 KG/M2 | HEART RATE: 72 BPM | RESPIRATION RATE: 20 BRPM | TEMPERATURE: 98.4 F | WEIGHT: 165 LBS | HEIGHT: 63 IN

## 2022-03-21 DIAGNOSIS — I10 ELEVATED BLOOD PRESSURE READING WITH DIAGNOSIS OF HYPERTENSION: ICD-10-CM

## 2022-03-21 DIAGNOSIS — E11.43 DIABETIC GASTROPARESIS ASSOCIATED WITH TYPE 2 DIABETES MELLITUS: Primary | ICD-10-CM

## 2022-03-21 DIAGNOSIS — K31.84 DIABETIC GASTROPARESIS ASSOCIATED WITH TYPE 2 DIABETES MELLITUS: Primary | ICD-10-CM

## 2022-03-21 DIAGNOSIS — R10.84 GENERALIZED ABDOMINAL PAIN: ICD-10-CM

## 2022-03-21 LAB
ALBUMIN SERPL-MCNC: 4.7 G/DL (ref 3.5–5.2)
ALBUMIN/GLOB SERPL: 1.6 G/DL
ALP SERPL-CCNC: 48 U/L (ref 39–117)
ALT SERPL W P-5'-P-CCNC: 36 U/L (ref 1–33)
ANION GAP SERPL CALCULATED.3IONS-SCNC: 13 MMOL/L (ref 5–15)
AST SERPL-CCNC: 27 U/L (ref 1–32)
BASOPHILS # BLD AUTO: 0.05 10*3/MM3 (ref 0–0.2)
BASOPHILS NFR BLD AUTO: 0.6 % (ref 0–1.5)
BILIRUB SERPL-MCNC: 0.3 MG/DL (ref 0–1.2)
BILIRUB UR QL STRIP: NEGATIVE
BUN SERPL-MCNC: 22 MG/DL (ref 8–23)
BUN/CREAT SERPL: 31.9 (ref 7–25)
CALCIUM SPEC-SCNC: 9.8 MG/DL (ref 8.6–10.5)
CHLORIDE SERPL-SCNC: 97 MMOL/L (ref 98–107)
CLARITY UR: CLEAR
CO2 SERPL-SCNC: 24 MMOL/L (ref 22–29)
COLOR UR: YELLOW
CREAT SERPL-MCNC: 0.69 MG/DL (ref 0.57–1)
D-LACTATE SERPL-SCNC: 2.6 MMOL/L (ref 0.5–2)
DEPRECATED RDW RBC AUTO: 45.1 FL (ref 37–54)
EGFRCR SERPLBLD CKD-EPI 2021: 98.3 ML/MIN/1.73
EOSINOPHIL # BLD AUTO: 0.18 10*3/MM3 (ref 0–0.4)
EOSINOPHIL NFR BLD AUTO: 2 % (ref 0.3–6.2)
ERYTHROCYTE [DISTWIDTH] IN BLOOD BY AUTOMATED COUNT: 14.6 % (ref 12.3–15.4)
GLOBULIN UR ELPH-MCNC: 2.9 GM/DL
GLUCOSE SERPL-MCNC: 288 MG/DL (ref 65–99)
GLUCOSE UR STRIP-MCNC: ABNORMAL MG/DL
HCT VFR BLD AUTO: 43.9 % (ref 34–46.6)
HGB BLD-MCNC: 13.9 G/DL (ref 12–15.9)
HGB UR QL STRIP.AUTO: NEGATIVE
HOLD SPECIMEN: NORMAL
IMM GRANULOCYTES # BLD AUTO: 0.03 10*3/MM3 (ref 0–0.05)
IMM GRANULOCYTES NFR BLD AUTO: 0.3 % (ref 0–0.5)
KETONES UR QL STRIP: NEGATIVE
LEUKOCYTE ESTERASE UR QL STRIP.AUTO: NEGATIVE
LIPASE SERPL-CCNC: 108 U/L (ref 13–60)
LYMPHOCYTES # BLD AUTO: 3.08 10*3/MM3 (ref 0.7–3.1)
LYMPHOCYTES NFR BLD AUTO: 34.1 % (ref 19.6–45.3)
MCH RBC QN AUTO: 27.1 PG (ref 26.6–33)
MCHC RBC AUTO-ENTMCNC: 31.7 G/DL (ref 31.5–35.7)
MCV RBC AUTO: 85.6 FL (ref 79–97)
MONOCYTES # BLD AUTO: 0.62 10*3/MM3 (ref 0.1–0.9)
MONOCYTES NFR BLD AUTO: 6.9 % (ref 5–12)
NEUTROPHILS NFR BLD AUTO: 5.06 10*3/MM3 (ref 1.7–7)
NEUTROPHILS NFR BLD AUTO: 56.1 % (ref 42.7–76)
NITRITE UR QL STRIP: NEGATIVE
NRBC BLD AUTO-RTO: 0 /100 WBC (ref 0–0.2)
PH UR STRIP.AUTO: <=5 [PH] (ref 5–8)
PLATELET # BLD AUTO: 273 10*3/MM3 (ref 140–450)
PMV BLD AUTO: 9.9 FL (ref 6–12)
POTASSIUM SERPL-SCNC: 4.3 MMOL/L (ref 3.5–5.2)
PROT SERPL-MCNC: 7.6 G/DL (ref 6–8.5)
PROT UR QL STRIP: NEGATIVE
RBC # BLD AUTO: 5.13 10*6/MM3 (ref 3.77–5.28)
SODIUM SERPL-SCNC: 134 MMOL/L (ref 136–145)
SP GR UR STRIP: >=1.03 (ref 1–1.03)
UROBILINOGEN UR QL STRIP: ABNORMAL
WBC NRBC COR # BLD: 9.02 10*3/MM3 (ref 3.4–10.8)
WHOLE BLOOD HOLD SPECIMEN: NORMAL
WHOLE BLOOD HOLD SPECIMEN: NORMAL

## 2022-03-21 PROCEDURE — 74176 CT ABD & PELVIS W/O CONTRAST: CPT

## 2022-03-21 PROCEDURE — 99283 EMERGENCY DEPT VISIT LOW MDM: CPT

## 2022-03-21 PROCEDURE — 80053 COMPREHEN METABOLIC PANEL: CPT | Performed by: EMERGENCY MEDICINE

## 2022-03-21 PROCEDURE — 96375 TX/PRO/DX INJ NEW DRUG ADDON: CPT

## 2022-03-21 PROCEDURE — 85025 COMPLETE CBC W/AUTO DIFF WBC: CPT | Performed by: EMERGENCY MEDICINE

## 2022-03-21 PROCEDURE — 25010000002 METOCLOPRAMIDE PER 10 MG: Performed by: EMERGENCY MEDICINE

## 2022-03-21 PROCEDURE — 83690 ASSAY OF LIPASE: CPT | Performed by: EMERGENCY MEDICINE

## 2022-03-21 PROCEDURE — 96374 THER/PROPH/DIAG INJ IV PUSH: CPT

## 2022-03-21 PROCEDURE — 81003 URINALYSIS AUTO W/O SCOPE: CPT | Performed by: EMERGENCY MEDICINE

## 2022-03-21 PROCEDURE — 83605 ASSAY OF LACTIC ACID: CPT | Performed by: EMERGENCY MEDICINE

## 2022-03-21 PROCEDURE — 25010000002 KETOROLAC TROMETHAMINE PER 15 MG: Performed by: EMERGENCY MEDICINE

## 2022-03-21 RX ORDER — DICYCLOMINE HYDROCHLORIDE 10 MG/1
20 CAPSULE ORAL ONCE
Status: COMPLETED | OUTPATIENT
Start: 2022-03-21 | End: 2022-03-21

## 2022-03-21 RX ORDER — SODIUM CHLORIDE 9 MG/ML
10 INJECTION INTRAVENOUS AS NEEDED
Status: DISCONTINUED | OUTPATIENT
Start: 2022-03-21 | End: 2022-03-21 | Stop reason: HOSPADM

## 2022-03-21 RX ORDER — FAMOTIDINE 10 MG/ML
20 INJECTION, SOLUTION INTRAVENOUS ONCE
Status: COMPLETED | OUTPATIENT
Start: 2022-03-21 | End: 2022-03-21

## 2022-03-21 RX ORDER — METOCLOPRAMIDE HYDROCHLORIDE 5 MG/ML
10 INJECTION INTRAMUSCULAR; INTRAVENOUS ONCE
Status: COMPLETED | OUTPATIENT
Start: 2022-03-21 | End: 2022-03-21

## 2022-03-21 RX ORDER — KETOROLAC TROMETHAMINE 15 MG/ML
15 INJECTION, SOLUTION INTRAMUSCULAR; INTRAVENOUS ONCE
Status: COMPLETED | OUTPATIENT
Start: 2022-03-21 | End: 2022-03-21

## 2022-03-21 RX ORDER — ACETAMINOPHEN 500 MG
1000 TABLET ORAL EVERY 6 HOURS PRN
Qty: 30 TABLET | Refills: 0 | Status: SHIPPED | OUTPATIENT
Start: 2022-03-21

## 2022-03-21 RX ORDER — METOCLOPRAMIDE 10 MG/1
10 TABLET ORAL 3 TIMES DAILY PRN
Qty: 21 TABLET | Refills: 0 | Status: SHIPPED | OUTPATIENT
Start: 2022-03-21 | End: 2022-07-13

## 2022-03-21 RX ADMIN — SODIUM CHLORIDE, POTASSIUM CHLORIDE, SODIUM LACTATE AND CALCIUM CHLORIDE 1000 ML: 600; 310; 30; 20 INJECTION, SOLUTION INTRAVENOUS at 19:59

## 2022-03-21 RX ADMIN — DICYCLOMINE HYDROCHLORIDE 20 MG: 10 CAPSULE ORAL at 19:56

## 2022-03-21 RX ADMIN — METOCLOPRAMIDE 10 MG: 5 INJECTION, SOLUTION INTRAMUSCULAR; INTRAVENOUS at 19:59

## 2022-03-21 RX ADMIN — FAMOTIDINE 20 MG: 10 INJECTION, SOLUTION INTRAVENOUS at 19:58

## 2022-03-21 RX ADMIN — KETOROLAC TROMETHAMINE 15 MG: 15 INJECTION, SOLUTION INTRAMUSCULAR; INTRAVENOUS at 19:56

## 2022-03-21 NOTE — ED PROVIDER NOTES
Subjective   Patient is a 61 y.o. female presenting to the ED complaining of abdominal pain. The patient states that she has been experiencing non-radiative epigastric abdominal pain since 1100 this morning. She also reports nausea, but denies vomiting and diarrhea. The patient was seen by an urgent care facility, but they were unable to evaluate for possible pancreatitis, prompting the patient to present to the ED. She has a history of type II diabetes and a cholecystectomy. There are no other acute symptoms at this time.      History provided by:  Patient  Abdominal Pain  Pain location:  Epigastric  Pain radiates to:  Does not radiate  Pain severity:  Moderate  Onset quality:  Sudden  Duration:  8 hours  Timing:  Constant  Progression:  Unchanged  Chronicity:  New  Relieved by:  None tried  Worsened by:  Nothing  Ineffective treatments:  None tried  Associated symptoms: nausea (mild)    Associated symptoms: no diarrhea and no vomiting    Risk factors: obesity    Risk factors: not pregnant and no recent hospitalization        Review of Systems   Gastrointestinal: Positive for abdominal pain and nausea (mild). Negative for diarrhea and vomiting.   All other systems reviewed and are negative.      Past Medical History:   Diagnosis Date   • Anxiety    • Arthritis    • Depression    • Diabetes mellitus (HCC)    • Fibromyalgia, primary    • GERD (gastroesophageal reflux disease)    • Hyperlipidemia    • Hypertension    • Hypothyroidism    • IBS (irritable bowel syndrome)    • New onset of headaches    • Type 2 diabetes mellitus (HCC)        Allergies   Allergen Reactions   • Atorvastatin    • Crestor [Rosuvastatin Calcium] Myalgia   • Simvastatin    • Trulicity  [Dulaglutide]    • Sulfa Antibiotics Rash       Past Surgical History:   Procedure Laterality Date   • APPENDECTOMY     • BREAST EXCISIONAL BIOPSY Left     [place]   • CHOLECYSTECTOMY     • EYE SURGERY      eye lid surgery 2020   • KIDNEY STONE SURGERY     •  RHINOPLASTY     • ROOT CANAL         Family History   Problem Relation Age of Onset   • Diabetes Mother    • COPD Mother    • Hypertension Mother    • Thyroid disease Mother    • Heart disease Mother    • Cancer Father         melanoma   • Melanoma Father    • Diabetes Brother    • Depression Brother    • Anxiety disorder Brother    • Bleeding Disorder Daughter    • Fibromyalgia Daughter    • Obesity Other    • Thyroid disease Other    • Pulmonary embolism Other    • Melanoma Other    • Arthritis Other    • Hyperlipidemia Other    • Cancer Brother    • Melanoma Brother    • Anxiety disorder Brother    • Depression Brother    • Heart attack Brother    • Diabetes Brother    • Anxiety disorder Brother    • Depression Brother    • Heart failure Brother    • Thyroid disease Brother    • BRCA 1/2 Neg Hx    • Breast cancer Neg Hx    • Colon cancer Neg Hx    • Endometrial cancer Neg Hx    • Ovarian cancer Neg Hx        Social History     Socioeconomic History   • Marital status:    Tobacco Use   • Smoking status: Former Smoker     Packs/day: 1.00     Years: 27.00     Pack years: 27.00     Types: Cigarettes     Start date:      Quit date:      Years since quittin.2   • Smokeless tobacco: Never Used   • Tobacco comment:  QUIT    Vaping Use   • Vaping Use: Never used   Substance and Sexual Activity   • Alcohol use: No   • Drug use: No   • Sexual activity: Yes     Partners: Male     Birth control/protection: Post-menopausal         Objective   Physical Exam  Vitals and nursing note reviewed.   Constitutional:       General: She is not in acute distress.     Appearance: Normal appearance.   HENT:      Head: Normocephalic and atraumatic.   Cardiovascular:      Rate and Rhythm: Normal rate and regular rhythm.      Heart sounds: Normal heart sounds.   Pulmonary:      Effort: Pulmonary effort is normal.      Breath sounds: Normal breath sounds.   Abdominal:      General: There is no distension.       Palpations: Abdomen is soft.      Tenderness: There is abdominal tenderness in the epigastric area. There is no guarding.      Comments: Tenderness to palpation in the epigastric region of the abdomen.   Musculoskeletal:         General: Normal range of motion.      Cervical back: Normal range of motion and neck supple.   Skin:     General: Skin is warm and dry.   Neurological:      Mental Status: She is alert and oriented to person, place, and time.   Psychiatric:         Mood and Affect: Mood normal.         Behavior: Behavior normal.         Procedures         ED Course  ED Course as of 03/21/22 2250   Mon Mar 21, 2022   1933 Lactate(!!): 2.6 [RS]   2057 CT Abdomen Pelvis Without Contrast  Personally reviewed the CT scan as well as the report of radiology.  No acute or emergent findings.  No inflammation associated with the pancreas.  No obstructions or surgical findings.   [RS]   2059 Patient's presentation and findings are most consistent with likely acute exacerbation of gastroparesis.  We will discharge the patient home with symptomatic management.  She does report feeling much better at this time. I had a discussion with the patient/family regarding diagnosis, diagnostic results, treatment plan, and medications.  The patient/family indicated understanding of these instructions.  I spent adequate time at the bedside proceeding discharge necessary to personally discuss the aftercare instructions, giving patient education, providing explanations of the results of our evaluations/findings, and my decision making to assure that the patient/family understand the plan of care.  Time was allotted to answer questions at that time and throughout the ED course.  Emphasis was placed on timely follow-up after discharge.  I also discussed the potential for the development of an acute emergent condition requiring further evaluation, admission, or even surgical intervention. I discussed that we found nothing during the  visit today indicating the need for further workup, admission, or the presence of an unstable medical condition.  I encouraged the patient to return to the emergency department immediately for ANY concerns, worsening, new complaints, or if symptoms persist and unable to seek follow-up in a timely fashion.  The patient/family expressed understanding and agreement with this plan.  [RS]      ED Course User Index  [RS] Сергей Gaines MD     Recent Results (from the past 24 hour(s))   Comprehensive Metabolic Panel    Collection Time: 03/21/22  6:49 PM    Specimen: Blood   Result Value Ref Range    Glucose 288 (H) 65 - 99 mg/dL    BUN 22 8 - 23 mg/dL    Creatinine 0.69 0.57 - 1.00 mg/dL    Sodium 134 (L) 136 - 145 mmol/L    Potassium 4.3 3.5 - 5.2 mmol/L    Chloride 97 (L) 98 - 107 mmol/L    CO2 24.0 22.0 - 29.0 mmol/L    Calcium 9.8 8.6 - 10.5 mg/dL    Total Protein 7.6 6.0 - 8.5 g/dL    Albumin 4.70 3.50 - 5.20 g/dL    ALT (SGPT) 36 (H) 1 - 33 U/L    AST (SGOT) 27 1 - 32 U/L    Alkaline Phosphatase 48 39 - 117 U/L    Total Bilirubin 0.3 0.0 - 1.2 mg/dL    Globulin 2.9 gm/dL    A/G Ratio 1.6 g/dL    BUN/Creatinine Ratio 31.9 (H) 7.0 - 25.0    Anion Gap 13.0 5.0 - 15.0 mmol/L    eGFR 98.3 >60.0 mL/min/1.73   Lipase    Collection Time: 03/21/22  6:49 PM    Specimen: Blood   Result Value Ref Range    Lipase 108 (H) 13 - 60 U/L   Lactic Acid, Plasma    Collection Time: 03/21/22  6:49 PM    Specimen: Blood   Result Value Ref Range    Lactate 2.6 (C) 0.5 - 2.0 mmol/L   Green Top (Gel)    Collection Time: 03/21/22  6:49 PM   Result Value Ref Range    Extra Tube Hold for add-ons.    Lavender Top    Collection Time: 03/21/22  6:49 PM   Result Value Ref Range    Extra Tube hold for add-on    Gold Top - SST    Collection Time: 03/21/22  6:49 PM   Result Value Ref Range    Extra Tube Hold for add-ons.    Light Blue Top    Collection Time: 03/21/22  6:49 PM   Result Value Ref Range    Extra Tube hold for add-on    CBC Auto  Differential    Collection Time: 03/21/22  6:49 PM    Specimen: Blood   Result Value Ref Range    WBC 9.02 3.40 - 10.80 10*3/mm3    RBC 5.13 3.77 - 5.28 10*6/mm3    Hemoglobin 13.9 12.0 - 15.9 g/dL    Hematocrit 43.9 34.0 - 46.6 %    MCV 85.6 79.0 - 97.0 fL    MCH 27.1 26.6 - 33.0 pg    MCHC 31.7 31.5 - 35.7 g/dL    RDW 14.6 12.3 - 15.4 %    RDW-SD 45.1 37.0 - 54.0 fl    MPV 9.9 6.0 - 12.0 fL    Platelets 273 140 - 450 10*3/mm3    Neutrophil % 56.1 42.7 - 76.0 %    Lymphocyte % 34.1 19.6 - 45.3 %    Monocyte % 6.9 5.0 - 12.0 %    Eosinophil % 2.0 0.3 - 6.2 %    Basophil % 0.6 0.0 - 1.5 %    Immature Grans % 0.3 0.0 - 0.5 %    Neutrophils, Absolute 5.06 1.70 - 7.00 10*3/mm3    Lymphocytes, Absolute 3.08 0.70 - 3.10 10*3/mm3    Monocytes, Absolute 0.62 0.10 - 0.90 10*3/mm3    Eosinophils, Absolute 0.18 0.00 - 0.40 10*3/mm3    Basophils, Absolute 0.05 0.00 - 0.20 10*3/mm3    Immature Grans, Absolute 0.03 0.00 - 0.05 10*3/mm3    nRBC 0.0 0.0 - 0.2 /100 WBC   Urinalysis With Microscopic If Indicated (No Culture) - Urine, Clean Catch    Collection Time: 03/21/22  8:01 PM    Specimen: Urine, Clean Catch   Result Value Ref Range    Color, UA Yellow Yellow, Straw    Appearance, UA Clear Clear    pH, UA <=5.0 5.0 - 8.0    Specific Gravity, UA >=1.030 1.001 - 1.030    Glucose, UA >=1000 mg/dL (3+) (A) Negative    Ketones, UA Negative Negative    Bilirubin, UA Negative Negative    Blood, UA Negative Negative    Protein, UA Negative Negative    Leuk Esterase, UA Negative Negative    Nitrite, UA Negative Negative    Urobilinogen, UA 0.2 E.U./dL 0.2 - 1.0 E.U./dL     Note: In addition to lab results from this visit, the labs listed above may include labs taken at another facility or during a different encounter within the last 24 hours. Please correlate lab times with ED admission and discharge times for further clarification of the services performed during this visit.    CT Abdomen Pelvis Without Contrast   Final Result       1.  " No acute process seen within the abdomen or pelvis.   2.  Nonobstructing right renal stones.  No evidence of ureteral stone or   hydronephrosis.       This report was finalized on 3/21/2022 8:21 PM by Raghav Stanton MD.            Vitals:    03/21/22 1834 03/21/22 1952 03/21/22 2149   BP: 158/64 133/50 133/56   BP Location: Left arm Right arm Right arm   Patient Position: Sitting Lying Lying   Pulse: 89 80 72   Resp: 20 20 20   Temp: 98.4 °F (36.9 °C)     TempSrc: Oral     SpO2: 95% 96% 98%   Weight: 74.8 kg (165 lb)     Height: 158.8 cm (62.5\")       Medications   Sodium Chloride (PF) 0.9 % 10 mL (has no administration in time range)   lactated ringers bolus 1,000 mL (0 mL Intravenous Stopped 3/21/22 2149)   famotidine (PEPCID) injection 20 mg (20 mg Intravenous Given 3/21/22 1958)   metoclopramide (REGLAN) injection 10 mg (10 mg Intravenous Given 3/1959)   ketorolac (TORADOL) injection 15 mg (15 mg Intravenous Given 3/21/22 1956)   dicyclomine (BENTYL) capsule 20 mg (20 mg Oral Given 3/21/22 1956)     ECG/EMG Results (last 24 hours)     ** No results found for the last 24 hours. **        No orders to display                                              MDM  Number of Diagnoses or Management Options     Amount and/or Complexity of Data Reviewed  Clinical lab tests: reviewed  Tests in the radiology section of CPT®: reviewed  Independent visualization of images, tracings, or specimens: yes        Final diagnoses:   Diabetic gastroparesis associated with type 2 diabetes mellitus (HCC)   Generalized abdominal pain   Elevated blood pressure reading with diagnosis of hypertension     DISCHARGE    Patient discharged in stable condition.    Reviewed implications of results, diagnosis, meds, responsibility to follow up, warning signs and symptoms of possible worsening, potential complications and reasons to return to ER.    Patient/Family voiced understanding of above instructions.    Discussed plan for discharge, " as there is no emergent indication for admission.  Pt/family is agreeable and understands need for follow up and possible repeat testing.  Pt/family is aware that discharge does not mean that nothing is wrong but that it indicates no emergency is currently present that requires admission and they must continue care with follow-up as given below or with a physician of their choice.     FOLLOW-UP  Ana Galindo, APRN  [address] [facility] [address]  [place] [address]  [phone]    Schedule an appointment as soon as possible for a visit       [facility] Emergency Department  [address] [facility] [id number] [phone]    As needed, If symptoms worsen or ANY concerns.         Medication List      New Prescriptions    acetaminophen 500 MG tablet  Commonly known as: TYLENOL  Take 2 tablets by mouth Every 6 (Six) Hours As Needed for Mild Pain  or Moderate Pain .     hyoscyamine 0.125 MG SL tablet  Commonly known as: LEVSIN  Take 1 tablet by mouth Every 4 (Four) Hours As Needed for Cramping.     metoclopramide 10 MG tablet  Commonly known as: REGLAN  Take 1 tablet by mouth 3 (Three) Times a Day As Needed (nausea and abdominal pain).           Where to Get Your Medications      These medications were [facility] to [facility] [address] [place] [address] AT [facility] [phone]  - [phone] [address] [address]    Phone: [phone]   · acetaminophen 500 MG tablet  · hyoscyamine 0.125 MG SL tablet  · metoclopramide 10 MG tablet       Documentation assistance provided by rodrigue Morales.  Information recorded by the rodrigue was done at my direction and has been verified and validated by me.     Jose A Morales  03/21/22 3671       Сергей Gaines MD  03/21/22 0119

## 2022-03-24 PROBLEM — R42 DIZZINESS: Status: ACTIVE | Noted: 2022-03-24

## 2022-03-24 PROBLEM — H66.003 NON-RECURRENT ACUTE SUPPURATIVE OTITIS MEDIA OF BOTH EARS WITHOUT SPONTANEOUS RUPTURE OF TYMPANIC MEMBRANES: Status: ACTIVE | Noted: 2022-03-24

## 2022-03-24 NOTE — ASSESSMENT & PLAN NOTE
Take medications as directed  Drink plenty of fluids  Keep ears dry  Tylenol/Motrin PRN as directed  RTO or call PRN persistent or worsening symptoms

## 2022-03-28 ENCOUNTER — TELEPHONE (OUTPATIENT)
Dept: FAMILY MEDICINE CLINIC | Facility: CLINIC | Age: 62
End: 2022-03-28

## 2022-03-30 ENCOUNTER — TREATMENT (OUTPATIENT)
Dept: PHYSICAL THERAPY | Facility: CLINIC | Age: 62
End: 2022-03-30

## 2022-03-30 DIAGNOSIS — R42 VERTIGO: Primary | ICD-10-CM

## 2022-03-30 PROCEDURE — 97161 PT EVAL LOW COMPLEX 20 MIN: CPT | Performed by: PHYSICAL THERAPIST

## 2022-03-30 PROCEDURE — 95992 CANALITH REPOSITIONING PROC: CPT | Performed by: PHYSICAL THERAPIST

## 2022-03-30 NOTE — PROGRESS NOTES
Physical Therapy Initial Evaluation and Plan of Care      Patient: Christi Chapa   : 1960  Diagnosis/ICD-10 Code:  Vertigo [R42]  Referring practitioner: Meli Oleary DO  Date of Initial Visit: 3/30/2022  Today's Date: 3/30/2022  Patient seen for 1 sessions      Subjective:       Subjective Evaluation    History of Present Illness  Mechanism of injury: Patient reports she had a double ear infection and sometime after this she woke up and the room was spinning. It was severe initially but now it comes and goes. She went back to the doctor and they told her to come to therapy.       Patient Occupation: disabled - fibromyalgia and back pain       Objective     PT Neuro   22 0900   Symptom Behavior   Type of Dizziness Funny feeling in head;Sensation of motion at rest;World moves   Frequency of Dizziness Several Times a Day   Duration of Dizziness Seconds   VAS Intensity (0-10) 5   Aggravating Factors Rolling to right;Mornings;Supine to sit  (body wants to go to the L)   Relieving Factors Rest   Occulomotor Exam Fixation Present   Occular ROM Abnormal   Smooth Pursuit Saccadic   Saccades Undershoots   Convergence Normal   Positional Testing   Positional Testing Without infrared goggles   Chelle-Hallpike Right No nystagmus  (sx present)   [place]-Hallpike Left No nystagmus  (sx present)          Assessment & Plan     Assessment  Impairments: lacks appropriate home exercise program  Functional Limitations: moving in bed and stooping  Assessment details: Patient is a 61 YOF that presents with new onset vertigo post ear infection. She displayed saccadic eye movement and most symptoms were reported as R sided. Chelle Hallpike was performed and no nystagmus was noted, however symptoms present on both sides. L side was treated with Epley Maneuver due to most symptoms noted. She was given pictures and instructions for home performance. She will follow up as needed if she remains dizzy.   Prognosis:  fair    Goals  Plan Goals: SHORT TERM GOALS: To be met in 2 weeks:  1. Patient is independent with HEP.  2. Patient will report at least 30% improvement in overall condition.    LONG TERM GOALS:To be met in 4 weeks:  1. Patient will report decreased disequilibrium/dizziness by at least 90%.  2. Patient will report no loss of balance with ADLs to demonstrate improved functional balance.  3. Patient denies dizziness with daily activity.      Plan  Therapy options: will be seen for skilled therapy services  Planned therapy interventions: balance/weight-bearing training, home exercise program and neuromuscular re-education  Frequency: 1x week  Duration in visits: 4  Plan details: Patient will be seen 1x/wk for 4 visits with treatment to include canalith repositioning, neuromuscular re-education, balance and gait training, along with therapeutic exercise as indicated.         Timed:  Manual Therapy:    0     mins  36253;  Therapeutic Exercise:    0     mins  62023;     Neuromuscular Shania:    0    mins  23133;    Therapeutic Activity:     0     mins  75568;     Gait Trainin     mins  88831;     Electrical Stimulation:    0     mins  70363 ( );    Untimed:  Canalith Repositioning    x     mins 43799    Timed Treatment:   0   mins   Total Treatment:     45   mins    PT SIGNATURE: Radha Merrill, PT, DPT, MSCS, CDP  [place] License #[id number]  DATE TREATMENT INITIATED: 3/30/2022      Initial Certification Certification Period: 3/30/6861xcui9/27/2022  I certify that the therapy services are furnished while this patient is under my care.  The services outlined above are required by this patient, and will be reviewed every 90 days.     PHYSICIAN: Meli Oleary DO  NPI: [id number]                                         DATE:     Please sign and return via fax to [phone].   Thank you,   [facility].

## 2022-04-28 ENCOUNTER — OFFICE VISIT (OUTPATIENT)
Dept: ENDOCRINOLOGY | Facility: CLINIC | Age: 62
End: 2022-04-28

## 2022-04-28 VITALS
WEIGHT: 163 LBS | HEART RATE: 88 BPM | HEIGHT: 62 IN | DIASTOLIC BLOOD PRESSURE: 76 MMHG | SYSTOLIC BLOOD PRESSURE: 128 MMHG | BODY MASS INDEX: 30 KG/M2 | OXYGEN SATURATION: 98 %

## 2022-04-28 DIAGNOSIS — E78.5 HYPERLIPIDEMIA, UNSPECIFIED HYPERLIPIDEMIA TYPE: ICD-10-CM

## 2022-04-28 DIAGNOSIS — Z79.4 TYPE 2 DIABETES MELLITUS WITH HYPERGLYCEMIA, WITH LONG-TERM CURRENT USE OF INSULIN: Primary | ICD-10-CM

## 2022-04-28 DIAGNOSIS — E11.65 TYPE 2 DIABETES MELLITUS WITH HYPERGLYCEMIA, WITH LONG-TERM CURRENT USE OF INSULIN: Primary | ICD-10-CM

## 2022-04-28 LAB
EXPIRATION DATE: ABNORMAL
EXPIRATION DATE: NORMAL
GLUCOSE BLDC GLUCOMTR-MCNC: 143 MG/DL (ref 70–130)
HBA1C MFR BLD: 7.4 %
Lab: ABNORMAL
Lab: NORMAL

## 2022-04-28 PROCEDURE — 99214 OFFICE O/P EST MOD 30 MIN: CPT | Performed by: INTERNAL MEDICINE

## 2022-04-28 PROCEDURE — 82947 ASSAY GLUCOSE BLOOD QUANT: CPT | Performed by: INTERNAL MEDICINE

## 2022-04-28 PROCEDURE — 3051F HG A1C>EQUAL 7.0%<8.0%: CPT | Performed by: INTERNAL MEDICINE

## 2022-04-28 PROCEDURE — 83036 HEMOGLOBIN GLYCOSYLATED A1C: CPT | Performed by: INTERNAL MEDICINE

## 2022-04-28 RX ORDER — SEMAGLUTIDE 1.34 MG/ML
0.25 INJECTION, SOLUTION SUBCUTANEOUS WEEKLY
Qty: 1.5 ML | Refills: 1 | Status: SHIPPED | OUTPATIENT
Start: 2022-04-28 | End: 2022-05-12

## 2022-04-28 RX ORDER — CALCIUM CITRATE/VITAMIN D3 200MG-6.25
TABLET ORAL
Qty: 100 EACH | Refills: 11 | Status: SHIPPED | OUTPATIENT
Start: 2022-04-28

## 2022-04-28 RX ORDER — ERGOCALCIFEROL (VITAMIN D2) 10 MCG
400 TABLET ORAL DAILY
COMMUNITY

## 2022-04-28 NOTE — PROGRESS NOTES
"Chief Complaint   Patient presents with   • Diabetes        HPI   Christi Chapa is a 61 y.o. female had concerns including Diabetes.      Patient presents for follow-up of diabetes.  She does report abnormal yeast infection since last visit.  She is also had diagnosis of UTI but states that she had recurrent UTI even prior to initiation of Jardiance.  She does report these infections are new.  She is monitoring blood glucose on average once daily, fasting values in the past week ranging , single value available later in the day of 127.  Patient does report that she has been restricting her diet which makes ongoing hyperglycemia somewhat frustrating.  She reports she is unable to exercise secondary to fibromyalgia.      Current diabetes medications include the following:  Lantus 40 units daily  Metformin 1000 mg twice daily  Jardiance 25 mg daily  Glimepiride 4 mg twice daily    Patient reports that she desires trial of diabetes medication which would aid in weight loss.  Reviewed classes of diabetes medications which can also result in weight loss, patient is already taking SGLT2 inhibitor.  She previously had abdominal pain when taking Trulicity but does report a desire to trial an alternative medication and not cost, as able.    Patient continues on Zetia 10 mg daily, 145 mg, pravastatin 40 mg for hyperlipidemia.  She denies myalgias or abdominal pain.    Screening labs up-to-date from September 2021  Monofilament exam completed September 2021    The following portions of the patient's history were reviewed and updated as appropriate: allergies, current medications and past social history.    Review of Systems   ROS was reviewed and verified. All other ROS negative.    /76 (BP Location: Left arm, Patient Position: Sitting, Cuff Size: Adult)   Pulse 88   Ht 157.5 cm (62\")   Wt 73.9 kg (163 lb)   SpO2 98%   BMI 29.81 kg/m²      Physical Exam      Constitutional:  well developed; well " nourished  no acute distress  obese - Body mass index is 29.81 kg/m².   ENT/Thyroid: not examined   Eyes: Conjunctiva: clear   Respiratory:  breathing is unlabored  clear to auscultation bilaterally   Cardiovascular:  regular rate and rhythm   Chest:  Not performed.   Abdomen: soft, non-tender; no masses   : Not performed.   Musculoskeletal: Not performed   Skin: dry and warm   Neuro: mental status, speech normal   Psych: mood and affect are within normal limits       Labs/Imaging   Latest Reference Range & Units 04/28/22 11:38   Glucose 70 - 130 mg/dL 143 !   Hemoglobin A1C % 7.4   !: Data is abnormal    Diagnoses and all orders for this visit:    1. Type 2 diabetes mellitus with hyperglycemia, with long-term current use of insulin (HCC) (Primary)  -Uncontrolled with hemoglobin A1c 7.4%  -Fasting blood sugars are generally at goal, discussed with patient this means blood sugars are likely rising later in the day but I am unable to confirm this without further glucose data.  Patient does report she would be willing to wear CGM to determine when glucose is rising.  Sample placed in office today.  -Discussed with patient that we cannot further increase basal insulin given majority of fasting sugars at goal in single instance of hypoglycemia.  Patient was given instructions on reduction of basal insulin should she have recurrence of overnight hypoglycemia.  She will continue 40 units daily for now.  Discussed need to reduce dose of Jardiance given urinary tract/yeast infections.  Will reduce to 10 mg daily, discussed potential need for discontinuation since symptoms persist.  Patient to continue glimepiride 4 mg twice daily  Patient continue metformin 1000 mg twice daily  Patient requests repeat trial of GLP-1 receptor agonist due to desire for weight loss.  Prescription sent for Ozempic 0.25 mg weekly.  We discussed potential adverse effects which include but are not limited to nausea, vomiting, diarrhea, abdominal  pain.  We reviewed risk of pancreatitis as well as data regarding medullary thyroid cancer.  We did discuss this is in the same class of medications as Trulicity which she took previously.  We did discuss the potential for this medication to worsen gastroparesis.  Patient still desires trial of medication.  -Patient was advised to monitor blood sugar 3 times daily.  Patient was instructed to bring glucometer to all future appointments. Patient should contact the clinic between appointments with hypoglycemia or persistent hyperglycemia.  Discussed signs and symptoms of hypoglycemia as well as hypoglycemia management via the rule of 15's.  Discussed potential for long-term complications with uncontrolled diabetes including nephropathy, neuropathy, retinopathy, increased risk for cardiac disease.  Discussed the role of diet and exercise in the management of diabetes.  -     POC Glucose, Blood  -     POC Glycosylated Hemoglobin (Hb A1C)    2. Hyperlipidemia, unspecified hyperlipidemia type  -lipid panel up-to-date from January 2022, triglycerides 266, LDL 80  Reviewed and hypertriglyceridemia will likely improve with improving glycemic control      No follow-ups on file. The patient was instructed to contact the clinic with any interval questions or concerns.    Sarah Amin MD   Endocrinologist    Dictated Utilizing Dragon Dictation

## 2022-04-29 ENCOUNTER — TELEPHONE (OUTPATIENT)
Dept: ENDOCRINOLOGY | Facility: CLINIC | Age: 62
End: 2022-04-29

## 2022-04-29 NOTE — TELEPHONE ENCOUNTER
Returned pharmacy call. They wanted to know if you wanted to keep PT on this dose or titrate up.  Please advise if so, they would need a new RX

## 2022-05-02 ENCOUNTER — TELEPHONE (OUTPATIENT)
Dept: ENDOCRINOLOGY | Facility: CLINIC | Age: 62
End: 2022-05-02

## 2022-05-02 NOTE — TELEPHONE ENCOUNTER
Called the pt and told her to call [employer] and report the problem. The [employer] sensor was put on 4-28-22 and is telling her it is time for a new sensor. She is going to [facility] report it to [employer].   25 feet/x 1 with RW and x 1 without AD

## 2022-05-02 NOTE — TELEPHONE ENCOUNTER
Patient called stated at her appointment last week we placed a sensor on her arm and it is now saying she needs a new one? Please advise.

## 2022-05-02 NOTE — TELEPHONE ENCOUNTER
Last Office Visit: 03/18/22  Next Office Visit:05/23/22  Last Refill Date:03/18/22  Quantity:30  Refills:0

## 2022-05-04 RX ORDER — MECLIZINE HYDROCHLORIDE 25 MG/1
TABLET ORAL
Qty: 30 TABLET | Refills: 0 | Status: SHIPPED | OUTPATIENT
Start: 2022-05-04 | End: 2022-07-13

## 2022-05-12 ENCOUNTER — OFFICE VISIT (OUTPATIENT)
Dept: ENDOCRINOLOGY | Facility: CLINIC | Age: 62
End: 2022-05-12

## 2022-05-12 VITALS
DIASTOLIC BLOOD PRESSURE: 76 MMHG | WEIGHT: 162 LBS | BODY MASS INDEX: 29.81 KG/M2 | SYSTOLIC BLOOD PRESSURE: 128 MMHG | HEIGHT: 62 IN | HEART RATE: 80 BPM | OXYGEN SATURATION: 96 %

## 2022-05-12 DIAGNOSIS — Z79.4 TYPE 2 DIABETES MELLITUS WITH HYPOGLYCEMIA WITHOUT COMA, WITH LONG-TERM CURRENT USE OF INSULIN: Primary | ICD-10-CM

## 2022-05-12 DIAGNOSIS — E11.649 TYPE 2 DIABETES MELLITUS WITH HYPOGLYCEMIA WITHOUT COMA, WITH LONG-TERM CURRENT USE OF INSULIN: Primary | ICD-10-CM

## 2022-05-12 LAB
EXPIRATION DATE: NORMAL
GLUCOSE BLDC GLUCOMTR-MCNC: 103 MG/DL (ref 70–130)
Lab: NORMAL

## 2022-05-12 PROCEDURE — 99214 OFFICE O/P EST MOD 30 MIN: CPT | Performed by: INTERNAL MEDICINE

## 2022-05-12 PROCEDURE — 95251 CONT GLUC MNTR ANALYSIS I&R: CPT | Performed by: INTERNAL MEDICINE

## 2022-05-12 RX ORDER — SEMAGLUTIDE 1.34 MG/ML
0.5 INJECTION, SOLUTION SUBCUTANEOUS WEEKLY
Qty: 1.5 ML | Refills: 3 | Status: SHIPPED | OUTPATIENT
Start: 2022-05-12 | End: 2022-07-13

## 2022-05-12 NOTE — PROGRESS NOTES
"Chief Complaint   Patient presents with   • Diabetes        HPI   Christi Chapa is a 61 y.o. female had concerns including Diabetes.      Patient reports that she reduce Lantus to 38 units following last visit given overnight hypoglycemia on CGM.  She has been using freestyle kassandra and reports that she likes this device would like to see if insurance would cover it for long-term use.  She also reports that vaginal irritation is much improved after reduction of Jardiance and she is potentially interested in discontinuing this medication completely.  Patient is tolerating Ozempic 0.25 mg weekly without incident.  Patient continues on metformin 1000 mg twice daily, glimepiride 8 mg daily.    The following portions of the patient's history were reviewed and updated as appropriate: allergies, current medications and past social history.    Review of Systems   Constitutional: Negative for unexpected weight gain and unexpected weight loss.   Gastrointestinal: Negative for abdominal pain, nausea and vomiting.   Endocrine: Negative for polydipsia and polyuria.      /76 (BP Location: Left arm, Patient Position: Sitting, Cuff Size: Adult)   Pulse 80   Ht 157.5 cm (62\")   Wt 73.5 kg (162 lb)   SpO2 96%   BMI 29.63 kg/m²      Physical Exam      Constitutional:  well developed; well nourished  no acute distress  appears stated age   ENT/Thyroid: not examined   Eyes: Conjunctiva: clear   Respiratory:  breathing is unlabored  clear to auscultation bilaterally   Cardiovascular:  regular rate and rhythm   Chest:  Not performed.   Abdomen: soft, non-tender; no masses   : Not performed.   Musculoskeletal: Not performed   Skin: not performed.   Neuro: mental status, speech normal   Psych: mood and affect are within normal limits       Labs/Imaging   Latest Reference Range & Units 05/12/22 12:49   Glucose 70 - 130 mg/dL 103      Latest Reference Range & Units 04/28/22 11:38   Hemoglobin A1C % 7.4     Freestyle kassandra " downloaded for review for dates ranging April 29 May 12, 2022, CGM active 62% time with average glucose 115, GMI 6.1% with 27.6% variability.  91% of data within target range, 3% low, 6% high.  Patient with downtrending blood glucose overnight with occasional overnight hypoglycemia.  She has postprandial hyperglycemia.    Diagnoses and all orders for this visit:    1. Type 2 diabetes mellitus with hypoglycemia without coma, with long-term current use of insulin (HCC) (Primary)  Uncontrolled with most recent hemoglobin A1c 7.4%, too soon to repeat today  CGM containing 72 hours of glucose data was downloaded and reviewed patient with downtrending blood glucose overnight with occasional overnight hypoglycemia.  Patient has postprandial hyperglycemia.  Reduce Lantus to 34 units daily  Discontinue Jardiance due to concern for recurrent UTI  Plan for increased dose of Ozempic to 0.5 mg weekly in 2 weeks time.  Patient to continue metformin 1000 mg twice daily  Patient to continue glimepiride 8 mg daily  -Patient was advised to monitor blood sugar 4 times daily if not using CGM.  CGM sent to pharmacy per patient request.  Patient was instructed to bring glucometer to all future appointments. Patient should contact the clinic between appointments with hypoglycemia or persistent hyperglycemia.  Discussed signs and symptoms of hypoglycemia as well as hypoglycemia management via the rule of 15's.  Discussed potential for long-term complications with uncontrolled diabetes including nephropathy, neuropathy, retinopathy, increased risk for cardiac disease.  Discussed the role of diet and exercise in the management of diabetes.  -     POC Glucose, Blood      Return in about 3 months (around 8/12/2022) for Next scheduled follow up. The patient was instructed to contact the clinic with any interval questions or concerns.    Sarah Amin MD   Endocrinologist    Dictated Utilizing Dragon Dictation

## 2022-05-13 RX ORDER — PRAVASTATIN SODIUM 40 MG
40 TABLET ORAL DAILY
Qty: 90 TABLET | Refills: 0 | Status: SHIPPED | OUTPATIENT
Start: 2022-05-13 | End: 2022-08-08

## 2022-05-23 ENCOUNTER — OFFICE VISIT (OUTPATIENT)
Dept: FAMILY MEDICINE CLINIC | Facility: CLINIC | Age: 62
End: 2022-05-23

## 2022-05-23 VITALS
OXYGEN SATURATION: 98 % | SYSTOLIC BLOOD PRESSURE: 120 MMHG | WEIGHT: 165.2 LBS | HEIGHT: 62 IN | HEART RATE: 79 BPM | DIASTOLIC BLOOD PRESSURE: 68 MMHG | BODY MASS INDEX: 30.4 KG/M2 | TEMPERATURE: 98.4 F

## 2022-05-23 DIAGNOSIS — Z12.4 PAP SMEAR FOR CERVICAL CANCER SCREENING: ICD-10-CM

## 2022-05-23 DIAGNOSIS — H91.92 DECREASED HEARING OF LEFT EAR: ICD-10-CM

## 2022-05-23 DIAGNOSIS — Z00.00 INITIAL MEDICARE ANNUAL WELLNESS VISIT: Primary | ICD-10-CM

## 2022-05-23 PROCEDURE — 96160 PT-FOCUSED HLTH RISK ASSMT: CPT | Performed by: NURSE PRACTITIONER

## 2022-05-23 PROCEDURE — G0402 INITIAL PREVENTIVE EXAM: HCPCS | Performed by: NURSE PRACTITIONER

## 2022-05-23 PROCEDURE — 1159F MED LIST DOCD IN RCRD: CPT | Performed by: NURSE PRACTITIONER

## 2022-05-23 NOTE — PROGRESS NOTES
The ABCs of the Annual Wellness Visit  Welcome to Medicare Visit    Chief Complaint   Patient presents with   • Medicare Wellness-subsequent     Subjective {   History of Present Illness:  Christi Chapa is a 61 y.o. female who presents for a  Welcome to Medicare Visit. She has complaints of decreased hearing in her left ear that started about 6 months ago and states when she bends over she experiences dizziness. She has history of vertigo that was relieved by physical therapy performing Epley maneuver.    The following portions of the patient's history were reviewed and   updated as appropriate: allergies, current medications, past family history, past medical history, past social history, past surgical history and problem list.     Compared to one year ago, the patient feels her physical   health is the same.    Compared to one year ago, the patient feels her mental   health is the same.    Recent Hospitalizations:  She was not admitted to the hospital during the last year.       Current Medical Providers:  Patient Care Team:  Ana Galindo APRN as PCP - General (Nurse Practitioner)  Ant Galvan MD (Inactive) as Consulting Physician (Gynecology)  Sarah Amin MD as Consulting Physician (Endocrinology)    Outpatient Medications Prior to Visit   Medication Sig Dispense Refill   • acetaminophen (TYLENOL) 500 MG tablet Take 2 tablets by mouth Every 6 (Six) Hours As Needed for Mild Pain  or Moderate Pain . 30 tablet 0   • ALPRAZolam (XANAX) 1 MG tablet Take  by mouth.     • ARIPiprazole (ABILIFY) 5 MG tablet      • aspirin (aspirin) 81 MG EC tablet Take 1 tablet by mouth Daily. 90 tablet 3   • Blood Glucose Monitoring Suppl device For use with glucose monitoring, daily; E11.65 1 each 0   • Continuous Blood Gluc Sensor (FreeStyle Gary 2 Sensor) misc 1 each Every 14 (Fourteen) Days. 9 each 3   • CRANBERRY CONCENTRATE PO Take  by mouth.     • ezetimibe (ZETIA) 10 MG tablet TAKE ONE TABLET BY MOUTH  DAILY 90 tablet 3   • fenofibrate (TRICOR) 145 MG tablet Take 1 tablet by mouth Daily. 30 tablet 3   • glimepiride (AMARYL) 4 MG tablet Take 1 tablet by mouth 2 (Two) Times a Day. 180 tablet 1   • glucose blood (True Metrix Blood Glucose Test) test strip Use as instructed 3 times daily 100 each 11   • Insulin Glargine (LANTUS SOLOSTAR) 100 UNIT/ML injection pen Inject 34 Units under the skin into the appropriate area as directed Daily. 36 mL 1   • Insulin Pen Needle 32G X 4 MM misc For use with insulin injections, daily 100 each 3   • Lactobacillus (PROBIOTIC ACIDOPHILUS PO) Take  by mouth.     • Lancets 33G misc 1 each Daily. For use with glucose monitoring, daily; E11.65 100 each 3   • levothyroxine (SYNTHROID, LEVOTHROID) 25 MCG tablet Take 1 tablet by mouth Daily. 30 tablet 3   • lisinopril (PRINIVIL,ZESTRIL) 2.5 MG tablet Take 1 tablet by mouth Daily. 30 tablet 3   • Magnesium Oxide 400 (240 Mg) MG tablet Take 1 tablet by mouth Daily.     • metFORMIN (GLUCOPHAGE) 1000 MG tablet Take 1 tablet by mouth 2 (Two) Times a Day With Meals. 180 tablet 1   • mirtazapine (REMERON) 15 MG tablet Take 1 tablet by mouth Every Night. 30 tablet 1   • nystatin (MYCOSTATIN) 654735 UNIT/GM cream Apply 1 application topically to the appropriate area as directed 2 (Two) Times a Day. 30 g 0   • Omega-3 Fatty Acids (FISH OIL PO) Take 1,000 mg by mouth 2 (two) times a day.     • pravastatin (Pravachol) 40 MG tablet Take 1 tablet by mouth Daily. 90 tablet 0   • Semaglutide,0.25 or 0.5MG/DOS, (Ozempic, 0.25 or 0.5 MG/DOSE,) 2 MG/1.5ML solution pen-injector Inject 0.5 mg under the skin into the appropriate area as directed 1 (One) Time Per Week. 1.5 mL 3   • venlafaxine XR (EFFEXOR-XR) 150 MG 24 hr capsule Take 1 capsule by mouth Daily.     • Vitamin D, Cholecalciferol, (CHOLECALCIFEROL) 10 MCG (400 UNIT) tablet Take 400 Units by mouth Daily.     • hyoscyamine (LEVSIN) 0.125 MG SL tablet Take 1 tablet by mouth Every 4 (Four) Hours As  Needed for Cramping. 20 tablet 0   • meclizine (ANTIVERT) 25 MG tablet TAKE ONE TABLET BY MOUTH THREE TIMES A DAY AS NEEDED FOR DIZZINESS 30 tablet 0   • metoclopramide (REGLAN) 10 MG tablet Take 1 tablet by mouth 3 (Three) Times a Day As Needed (nausea and abdominal pain). 21 tablet 0     No facility-administered medications prior to visit.       No opioid medication identified on active medication list. I have reviewed chart for other potential  high risk medication/s and harmful drug interactions in the elderly.          Aspirin is on active medication list. Aspirin use is indicated based on review of current medical condition/s. Pros and cons of this therapy have been discussed today. Benefits of this medication outweigh potential harm.  Patient has been encouraged to continue taking this medication.  .      Patient Active Problem List   Diagnosis   • Anxiety   • Fibromyalgia   • Gastroparesis   • Gastroesophageal reflux disease   • Granuloma annulare   • Gastrointestinal ulcer due to Helicobacter pylori   • Hyperlipidemia LDL goal <70   • Essential hypertension   • Hypothyroidism   • Vitamin D deficiency   • Necrobiosis diabeticorum (HCC)   • Spleen enlargement   • uterine Fibroids   • Seborrheic keratoses   • Fatty liver   • IBS (irritable bowel syndrome)   • Ovarian cyst   • Contusion of right chest wall   • GERD (gastroesophageal reflux disease)   • Depression   • Arthritis   • Menopause   • Vaginal atrophy   • Fibrocystic breast changes, bilateral   • Coronary artery disease involving native coronary artery of native heart with angina pectoris ([facility])   • Type 2 diabetes mellitus, without long-term current use of insulin ([facility])   • Acute cystitis with hematuria   • Painful urination   • Tonsillitis   • Mixed hyperlipidemia   • Sore throat   • Nasal sinus congestion   • Urinary frequency   • Dizziness   • Non-recurrent acute suppurative otitis media of both ears without spontaneous rupture of tympanic membranes  "  • Initial Medicare annual wellness visit   • Pap smear for cervical cancer screening   • Decreased hearing of left ear     Advance Care Planning  Advance Directive is not on file.  ACP discussion was held with the patient during this visit. Patient has an advance directive (not in EMR), copy requested.          Objective      Vitals:    05/23/22 1449   BP: 120/68   Pulse: 79   Temp: 98.4 °F (36.9 °C)   SpO2: 98%   Weight: 74.9 kg (165 lb 3.2 oz)   Height: 157.1 cm (61.85\")     BMI is >= 30 and <= 34.9 (Class 1 obesity). The following options were offered after discussion: weight loss educational material (shared in after visit summary), exercise counseling/recommendations and nutrition counseling/recommendations  Does the patient have evidence of cognitive impairment? No    Physical Exam  Vitals and nursing note reviewed.   Constitutional:       General: She is not in acute distress.     Appearance: Normal appearance.   HENT:      Head: Normocephalic and atraumatic.      Right Ear: Tympanic membrane, ear canal and external ear normal.      Left Ear: Tympanic membrane, ear canal and external ear normal.      Nose: Nose normal.      Mouth/Throat:      Mouth: Mucous membranes are moist.      Pharynx: Oropharynx is clear.   Eyes:      Extraocular Movements: Extraocular movements intact.      Conjunctiva/sclera: Conjunctivae normal.      Pupils: Pupils are equal, round, and reactive to light.   Neck:      Vascular: No carotid bruit.   Cardiovascular:      Rate and Rhythm: Normal rate and regular rhythm.      Pulses:           Dorsalis pedis pulses are 2+ on the right side and 2+ on the left side.      Heart sounds: Normal heart sounds. No murmur heard.    No gallop.   Pulmonary:      Effort: Pulmonary effort is normal.      Breath sounds: Normal breath sounds.   Abdominal:      General: Bowel sounds are normal.      Palpations: Abdomen is soft.      Comments: Rounded abdomen - baseline for her   Musculoskeletal:        "  General: Normal range of motion.      Cervical back: Normal range of motion and neck supple.      Right lower leg: No edema.      Left lower leg: No edema.      Right foot: Normal range of motion. No deformity or foot drop.      Left foot: Normal range of motion. No deformity or foot drop.   Feet:      Right foot:      Protective Sensation: 5 sites tested. 5 sites sensed.      Skin integrity: Dry skin present. No ulcer, blister, skin breakdown, erythema or warmth.      Toenail Condition: Right toenails are normal.      Left foot:      Protective Sensation: 5 sites tested. 5 sites sensed.      Skin integrity: Dry skin present. No ulcer, blister, skin breakdown, erythema or warmth.      Toenail Condition: Left toenails are normal.      Comments:      Skin:     General: Skin is warm and dry.      Capillary Refill: Capillary refill takes less than 2 seconds.   Neurological:      General: No focal deficit present.      Mental Status: She is alert and oriented to person, place, and time.   Psychiatric:         Mood and Affect: Mood normal.         Behavior: Behavior normal.         Thought Content: Thought content normal.         Judgment: Judgment normal.         Lab Results   Component Value Date    HGBA1C 7.4 2022       Procedures       HEALTH RISK ASSESSMENT    Smoking Status:  Social History     Tobacco Use   Smoking Status Former Smoker   • Packs/day: 1.00   • Years: 27.00   • Pack years: 27.00   • Types: Cigarettes   • Start date:    • Quit date:    • Years since quittin.4   Smokeless Tobacco Never Used   Tobacco Comment     QUIT      Alcohol Consumption:  Social History     Substance and Sexual Activity   Alcohol Use No       Fall Risk Screen:    [facility] Fall Risk Assessment has not been completed.    Depression Screen:   PHQ-2/PHQ-9 Depression Screening 2022   Retired PHQ-9 Total Score -   Retired Total Score -   Little Interest or Pleasure in Doing Things 3-->nearly every day    Feeling Down, Depressed or Hopeless 3-->nearly every day   Trouble Falling or Staying Asleep, or Sleeping Too Much 3-->nearly every day   Feeling Tired or Having Little Energy 3-->nearly every day   Poor Appetite or Overeating 0-->not at all   Feeling Bad about Yourself - or that You are a Failure or Have Let Yourself or Your Family Down 0-->not at all   Trouble Concentrating on Things, Such as Reading the Newspaper or Watching Television 3-->nearly every day   Moving or Speaking So Slowly that Other People Could Have Noticed? Or the Opposite - Being So Fidgety 0-->not at all   Thoughts that You Would be Better Off Dead or of Hurting Yourself in Some Way 0-->not at all   PHQ-9: Brief Depression Severity Measure Score 15   If You Checked Off Any Problems, How Difficult Have These Problems Made It For You to Do Your Work, Take Care of Things at Home, or Get Along with Other People? very difficult       Health Habits and Functional and Cognitive Screening:  No flowsheet data found.    Visual Acuity:    No exam data present    Age-appropriate Screening Schedule:  Refer to the list below for future screening recommendations based on patient's age, sex and/or medical conditions. Orders for these recommended tests are listed in the plan section. The patient has been provided with a written plan.    Health Maintenance   Topic Date Due   • DIABETIC EYE EXAM  07/11/2022 (Originally 3/17/2022)   • PAP SMEAR  07/15/2022 (Originally 9/5/2021)   • ZOSTER VACCINE (1 of 2) 05/23/2023 (Originally 11/27/2009)   • INFLUENZA VACCINE  08/01/2022   • URINE MICROALBUMIN  09/20/2022   • HEMOGLOBIN A1C  10/28/2022   • LIPID PANEL  01/12/2023   • DIABETIC FOOT EXAM  05/23/2023   • MAMMOGRAM  09/09/2023   • TDAP/TD VACCINES (2 - Td or Tdap) 08/17/2026          Assessment & Plan   CMS Preventative Services Quick Reference  Risk Factors Identified During Encounter  Cardiovascular Disease  Chronic Pain   Dementia/Memory    Depression/Dysphoria  Immunizations Discussed/Encouraged (specific Immunizations; Pneumococcal 23, Shingrix and COVID19  Inactivity/Sedentary  Obesity/Overweight   Tobacco Use/Dependance (use dotphrase .tobaccocessation for documentation)  The above risks/problems have been discussed with the patient.  Pertinent information has been shared with the patient in the After Visit Summary.  Follow up plans and orders are seen below in the Assessment/Plan Section.    Diagnoses and all orders for this visit:    1. Initial Medicare annual wellness visit (Primary)  Assessment & Plan:  The patient is here for health maintenance visit.  Currently, the patient consumes a healthy diet and has an adequate exercise regimen.  Screening lab work is ordered.  Immunizations were reviewed today.  Advice and education was given regarding nutrition, aerobic exercise, routine dental evaluations, routine eye exams, reproductive health, cardiovascular risk reduction, sunscreen use, self skin examination (annual dermatology evaluations) and seatbelt use (general overall safety).  Further recommendations will be given if needed after lab evaluation.  Annual wellness evaluation is recommended.        2. Pap smear for cervical cancer screening  -     Ambulatory Referral to Gynecology    3. Decreased hearing of left ear  -     Ambulatory Referral to ENT (Otolaryngology)      Follow Up:   Return in about 3 months (around 8/23/2022), or if symptoms worsen or fail to improve, for Next scheduled follow up HTN/hyperlipidemia.     An After Visit Summary and PPPS were made available to the patient.        I spent 35 minutes caring for Christi on this date of service. This time includes time spent by me in the following activities:preparing for the visit, reviewing tests, obtaining and/or reviewing a separately obtained history, performing a medically appropriate examination and/or evaluation , counseling and educating the patient/family/caregiver,  referring and communicating with other health care professionals , documenting information in the medical record and independently interpreting results and communicating that information with the patient/family/caregiver

## 2022-06-08 ENCOUNTER — OFFICE VISIT (OUTPATIENT)
Dept: FAMILY MEDICINE CLINIC | Facility: CLINIC | Age: 62
End: 2022-06-08

## 2022-06-08 VITALS
BODY MASS INDEX: 30.18 KG/M2 | RESPIRATION RATE: 16 BRPM | DIASTOLIC BLOOD PRESSURE: 70 MMHG | SYSTOLIC BLOOD PRESSURE: 152 MMHG | WEIGHT: 164 LBS | HEART RATE: 78 BPM | HEIGHT: 62 IN | TEMPERATURE: 99.1 F | OXYGEN SATURATION: 99 %

## 2022-06-08 DIAGNOSIS — L55.1 SUNBURN, SECOND DEGREE: Primary | ICD-10-CM

## 2022-06-08 PROCEDURE — 99213 OFFICE O/P EST LOW 20 MIN: CPT | Performed by: NURSE PRACTITIONER

## 2022-06-08 RX ORDER — IBUPROFEN 600 MG/1
600 TABLET ORAL EVERY 8 HOURS PRN
Qty: 30 TABLET | Refills: 0 | Status: SHIPPED | OUTPATIENT
Start: 2022-06-08

## 2022-06-08 NOTE — PROGRESS NOTES
"Chief Complaint  Sunburn (Lower legs)    Subjective          Christi Chapa presents to [facility]  Patient is a 61-year-old female.  She is here for complaint of sunburn.  She laid in her pool about 5 days ago.  She burns really quickly.  She has been using cold compresses, and aloe with lidocaine.  She has been keeping her legs elevated.  She is diabetic she does have normal kidney function.  She states she is eating and drinking normally.       Sunburn  This is a new problem. The current episode started in the past 7 days. The problem has been waxing and waning. Exacerbated by: sun light. She has tried NSAIDs and ice for the symptoms. The treatment provided mild relief.       The following portions of the patient's history were reviewed and updated as appropriate: allergies, current medications, past family history, past medical history, past social history, past surgical history and problem list.    Review of Systems   Constitutional: Positive for activity change.   HENT: Negative.    Respiratory: Negative.    Cardiovascular: Negative.    Gastrointestinal: Negative.    Genitourinary: Negative.    Musculoskeletal: Negative.    Skin: Positive for color change and wound.        Sunburn to both lower legs    Allergic/Immunologic: Negative.    Neurological: Negative.    Hematological: Negative.    Psychiatric/Behavioral: Negative.          Objective   Vital Signs:   /70   Pulse 78   Temp 99.1 °F (37.3 °C)   Resp 16   Ht 157.5 cm (62\")   Wt 74.4 kg (164 lb)   SpO2 99%   BMI 30.00 kg/m²            Physical Exam  Vitals (blood pressure is elevated ) reviewed.   HENT:      Nose: Nose normal.      Mouth/Throat:      Mouth: Mucous membranes are moist.   Cardiovascular:      Rate and Rhythm: Normal rate and regular rhythm.      Pulses: Normal pulses.      Heart sounds: Normal heart sounds.   Pulmonary:      Effort: Pulmonary effort is normal.      Breath sounds: Normal " breath sounds.   Abdominal:      General: Bowel sounds are normal.      Palpations: Abdomen is soft.   Skin:     General: Skin is warm.      Capillary Refill: Capillary refill takes less than 2 seconds.      Findings: Burn present.      Comments: Sunburn to lower legs    Neurological:      Mental Status: She is alert and oriented to person, place, and time.   Psychiatric:         Mood and Affect: Mood normal.         Behavior: Behavior normal.        Result Review :                 Assessment and Plan    Diagnoses and all orders for this visit:    1. Sunburn, second degree (Primary)  -     Diclofenac Sodium (VOLTAREN) 1 % gel gel; Apply 4 g topically to the appropriate area as directed 4 (Four) Times a Day As Needed (sunburned area).  Dispense: 100 g; Refill: 1  -     ibuprofen (ADVIL,MOTRIN) 600 MG tablet; Take 1 tablet by mouth Every 8 (Eight) Hours As Needed for Mild Pain .  Dispense: 30 tablet; Refill: 0        Follow Up   Return in about 4 weeks (around 7/6/2022) for Recheck.  Patient was given instructions and counseling regarding her condition or for health maintenance advice. Please see specific information pulled into the AVS if appropriate.

## 2022-06-10 ENCOUNTER — TELEPHONE (OUTPATIENT)
Dept: ENDOCRINOLOGY | Facility: CLINIC | Age: 62
End: 2022-06-10

## 2022-06-10 NOTE — TELEPHONE ENCOUNTER
Called the pt she is taking Lantus 34 units nightly and Glimepiride 4 mg bid. She is not taking the Ozempic due to her gastroparesis. Gary schmitz placed on your desk.

## 2022-06-10 NOTE — TELEPHONE ENCOUNTER
PT CALLED STATING FOR THE PAST WEEK HER BS HAS BEEN RUNNING AROUND 190. SHE REQUESTED A CALL BACK TO CONSULT.

## 2022-06-10 NOTE — TELEPHONE ENCOUNTER
Please contact patient, I have reviewed her CGM readings in the past 2 weeks which reveals average glucose 136, estimated A1c 6.6.  These values are generally at goal.    Please inquire if she is still taking metformin.  She is at the maximum dose of glimepiride.  Please clarify if Ozempic was causing nausea, if so, we could consider reducing back to 0.25 mg dose (she was tolerating this well at last visit).    If she does not want to continue Ozempic at a lower dose and is taking metformin, options for additional mealtime coverage are limited as she previously did not tolerate Jardiance.    In this setting, she could increase Lantus to 38 units.

## 2022-06-13 NOTE — TELEPHONE ENCOUNTER
Please contact patient, as stated in previous note, hemoglobin A1c on most recent CGM download 6.6.  This does not indicate a need for urgent changes.  She may increase Lantus to 38, as stated.  Further changes can be deferred to her next visit.

## 2022-06-13 NOTE — TELEPHONE ENCOUNTER
Called the pt and she is taking the Metformin and not taking the Ozempic. She did the Ozempic for 4 weeks and stopped it due to her gastroparesis. And she is not taking the Jardiance.    Please advise

## 2022-07-07 DIAGNOSIS — E78.2 MIXED HYPERLIPIDEMIA: Chronic | ICD-10-CM

## 2022-07-07 RX ORDER — FENOFIBRATE 145 MG/1
TABLET, COATED ORAL
Qty: 30 TABLET | Refills: 3 | Status: SHIPPED | OUTPATIENT
Start: 2022-07-07 | End: 2022-08-16 | Stop reason: SDUPTHER

## 2022-07-13 ENCOUNTER — LAB (OUTPATIENT)
Dept: LAB | Facility: HOSPITAL | Age: 62
End: 2022-07-13

## 2022-07-13 ENCOUNTER — OFFICE VISIT (OUTPATIENT)
Dept: OBSTETRICS AND GYNECOLOGY | Facility: CLINIC | Age: 62
End: 2022-07-13

## 2022-07-13 VITALS — DIASTOLIC BLOOD PRESSURE: 80 MMHG | BODY MASS INDEX: 30.62 KG/M2 | WEIGHT: 167.4 LBS | SYSTOLIC BLOOD PRESSURE: 138 MMHG

## 2022-07-13 DIAGNOSIS — Z01.419 WOMEN'S ANNUAL ROUTINE GYNECOLOGICAL EXAMINATION: Primary | ICD-10-CM

## 2022-07-13 DIAGNOSIS — E66.9 OBESITY (BMI 30-39.9): ICD-10-CM

## 2022-07-13 DIAGNOSIS — N95.2 VAGINAL ATROPHY: ICD-10-CM

## 2022-07-13 DIAGNOSIS — N89.8 OLD VAGINAL LACERATION: Primary | ICD-10-CM

## 2022-07-13 DIAGNOSIS — Z79.4 TYPE 2 DIABETES MELLITUS WITHOUT COMPLICATION, WITH LONG-TERM CURRENT USE OF INSULIN: ICD-10-CM

## 2022-07-13 DIAGNOSIS — E11.9 TYPE 2 DIABETES MELLITUS WITHOUT COMPLICATION, WITH LONG-TERM CURRENT USE OF INSULIN: ICD-10-CM

## 2022-07-13 DIAGNOSIS — Z12.31 ENCOUNTER FOR SCREENING MAMMOGRAM FOR MALIGNANT NEOPLASM OF BREAST: ICD-10-CM

## 2022-07-13 DIAGNOSIS — Z01.419 ENCOUNTER FOR ANNUAL ROUTINE GYNECOLOGICAL EXAMINATION: ICD-10-CM

## 2022-07-13 DIAGNOSIS — Z12.11 SCREENING FOR COLON CANCER: ICD-10-CM

## 2022-07-13 DIAGNOSIS — Z12.4 SCREENING FOR CERVICAL CANCER: ICD-10-CM

## 2022-07-13 DIAGNOSIS — N89.8 VAGINAL DISCHARGE: ICD-10-CM

## 2022-07-13 PROCEDURE — 87081 CULTURE SCREEN ONLY: CPT

## 2022-07-13 PROCEDURE — 99396 PREV VISIT EST AGE 40-64: CPT | Performed by: OBSTETRICS & GYNECOLOGY

## 2022-07-13 RX ORDER — ESTRADIOL 10 UG/1
1 INSERT VAGINAL 2 TIMES WEEKLY
Qty: 8 TABLET | Refills: 11 | Status: SHIPPED | OUTPATIENT
Start: 2022-07-14 | End: 2023-07-14

## 2022-07-13 NOTE — PROGRESS NOTES
Gynecologic Annual Exam Note        GYN Annual Exam     CC - Here for annual exam.        HPI  Christi Chapa is a 61 y.o. female, , who presents for annual well woman exam as a established patient.  She is postmenopausal. Denies vaginal bleeding.  Patient reports problems with: none. There were no changes to her medical or surgical history since her last visit.. Partner Status: Marital Status: .  She is is sexually active. She has not had new partners since her last STD testing. She does not desire STD testing. STD testing recommendations have been explained to the patient..     Additional OB/GYN History   On HRT? No    Last Pap : 2018. Results: negative. HPV: not done  Last Completed Pap Smear     This patient has no relevant Health Maintenance data.        History of abnormal Pap smear: no  Family history of uterine, colon, breast, or ovarian cancer: no  Performs monthly Self-Breast Exam: no  Last mammogram: 2021.     Last Completed Mammogram          Ordered - MAMMOGRAM (Every 2 Years) Ordered on 2021  Mammo Screening Digital Tomosynthesis Bilateral With CAD    2020  Mammo Screening Digital Tomosynthesis Bilateral With CAD    2018  Mammo Screening Digital Tomosynthesis Bilateral With CAD    2017  Mammo Diagnostic Digital Tomosynthesis Bilateral With CAD    2017  Declined    Only the first 5 history entries have been loaded, but more history exists.              Last colonoscopy: has had a colonoscopy 6 year(s) ago.    Last Completed Colonoscopy          Ordered - COLORECTAL CANCER SCREENING (COLONOSCOPY - Every 10 Years) Ordered on 2016  COLONOSCOPY (Declined)                  Last bone density scan (DEXA): Unknown date and results were Normal  Exercises Regularly: no  Feelings of Anxiety or Depression: no      Tobacco Usage?: No       Current Outpatient Medications:   •  acetaminophen (TYLENOL) 500 MG tablet, Take  2 tablets by mouth Every 6 (Six) Hours As Needed for Mild Pain  or Moderate Pain ., Disp: 30 tablet, Rfl: 0  •  ALPRAZolam (XANAX) 1 MG tablet, Take  by mouth., Disp: , Rfl:   •  aspirin (aspirin) 81 MG EC tablet, Take 1 tablet by mouth Daily., Disp: 90 tablet, Rfl: 3  •  Blood Glucose Monitoring Suppl device, For use with glucose monitoring, daily; E11.65, Disp: 1 each, Rfl: 0  •  Continuous Blood Gluc Sensor (FreeStyle Gary 2 Sensor) misc, 1 each Every 14 (Fourteen) Days., Disp: 9 each, Rfl: 3  •  CRANBERRY CONCENTRATE PO, Take  by mouth., Disp: , Rfl:   •  ezetimibe (ZETIA) 10 MG tablet, TAKE ONE TABLET BY MOUTH DAILY, Disp: 90 tablet, Rfl: 3  •  fenofibrate (TRICOR) 145 MG tablet, TAKE ONE TABLET BY MOUTH DAILY, Disp: 30 tablet, Rfl: 3  •  glimepiride (AMARYL) 4 MG tablet, Take 1 tablet by mouth 2 (Two) Times a Day., Disp: 180 tablet, Rfl: 1  •  glucose blood (True Metrix Blood Glucose Test) test strip, Use as instructed 3 times daily, Disp: 100 each, Rfl: 11  •  ibuprofen (ADVIL,MOTRIN) 600 MG tablet, Take 1 tablet by mouth Every 8 (Eight) Hours As Needed for Mild Pain ., Disp: 30 tablet, Rfl: 0  •  Insulin Glargine (LANTUS SOLOSTAR) 100 UNIT/ML injection pen, Inject 34 Units under the skin into the appropriate area as directed Daily., Disp: 36 mL, Rfl: 1  •  Insulin Pen Needle 32G X 4 MM misc, For use with insulin injections, daily, Disp: 100 each, Rfl: 3  •  Lactobacillus (PROBIOTIC ACIDOPHILUS PO), Take  by mouth., Disp: , Rfl:   •  Lancets 33G misc, 1 each Daily. For use with glucose monitoring, daily; E11.65, Disp: 100 each, Rfl: 3  •  levothyroxine (SYNTHROID, LEVOTHROID) 25 MCG tablet, Take 1 tablet by mouth Daily., Disp: 30 tablet, Rfl: 3  •  lisinopril (PRINIVIL,ZESTRIL) 2.5 MG tablet, Take 1 tablet by mouth Daily., Disp: 30 tablet, Rfl: 3  •  Magnesium Oxide 400 (240 Mg) MG tablet, Take 1 tablet by mouth Daily., Disp: , Rfl:   •  metFORMIN (GLUCOPHAGE) 1000 MG tablet, Take 1 tablet by mouth 2 (Two)  Times a Day With Meals., Disp: 180 tablet, Rfl: 1  •  mirtazapine (REMERON) 15 MG tablet, Take 1 tablet by mouth Every Night., Disp: 30 tablet, Rfl: 1  •  Omega-3 Fatty Acids (FISH OIL PO), Take 1,000 mg by mouth 2 (two) times a day., Disp: , Rfl:   •  pravastatin (Pravachol) 40 MG tablet, Take 1 tablet by mouth Daily., Disp: 90 tablet, Rfl: 0  •  venlafaxine XR (EFFEXOR-XR) 150 MG 24 hr capsule, Take 1 capsule by mouth Daily., Disp: , Rfl:   •  Vitamin D, Cholecalciferol, (CHOLECALCIFEROL) 10 MCG (400 UNIT) tablet, Take 400 Units by mouth Daily., Disp: , Rfl:   •  [START ON 2022] estradiol (Yuvafem) 10 MCG tablet vaginal tablet, Insert 1 tablet into the vagina 2 (Two) Times a Week., Disp: 8 tablet, Rfl: 11    Patient denies the need for medication refills today.    OB History        1    Para   1    Term   1            AB        Living           SAB        IAB        Ectopic        Molar        Multiple        Live Births                    Past Medical History:   Diagnosis Date   • Anxiety    • Arthritis    • Depression    • Diabetes mellitus (HCC)    • Fibromyalgia, primary    • GERD (gastroesophageal reflux disease)    • Hyperlipidemia    • Hypertension    • Hypothyroidism    • IBS (irritable bowel syndrome)    • New onset of headaches    • Type 2 diabetes mellitus (HCC)         Past Surgical History:   Procedure Laterality Date   • APPENDECTOMY     • BREAST EXCISIONAL BIOPSY Left     [place], [place]   • CHOLECYSTECTOMY     • EYE SURGERY      eye lid surgery    • KIDNEY STONE SURGERY     • RHINOPLASTY     • ROOT CANAL         Health Maintenance   Topic Date Due   • Pneumococcal Vaccine 0-64 (2 - PCV) 2020   • COVID-19 Vaccine (3 - Booster for Pfizer series) 2021   • DIABETIC EYE EXAM  2022   • PAP SMEAR  07/15/2022 (Originally 2021)   • ZOSTER VACCINE (1 of 2) 2023 (Originally 2009)   • URINE MICROALBUMIN  2022   • INFLUENZA VACCINE  10/01/2022   •  HEMOGLOBIN A1C  10/28/2022   • LIPID PANEL  01/12/2023   • ANNUAL WELLNESS VISIT  05/23/2023   • DIABETIC FOOT EXAM  05/23/2023   • Annual Gynecologic Pelvic and Breast Exam  07/14/2023   • MAMMOGRAM  09/09/2023   • TDAP/TD VACCINES (2 - Td or Tdap) 08/17/2026   • COLORECTAL CANCER SCREENING  08/17/2026   • HEPATITIS C SCREENING  Completed       The additional following portions of the patient's history were reviewed and updated as appropriate: allergies, current medications, past family history, past medical history, past social history and past surgical history.    Review of Systems   Constitutional: Negative.    HENT: Negative.    Eyes: Negative.    Respiratory: Negative.    Cardiovascular: Negative.    Gastrointestinal: Negative.    Endocrine: Negative.    Genitourinary: Negative.    Musculoskeletal: Negative.    Skin: Negative.    Allergic/Immunologic: Negative.    Neurological: Negative.    Hematological: Negative.    Psychiatric/Behavioral: Negative.        I have reviewed and agree with the HPI, ROS, and historical information as entered above. Tobi Jordan MD    Objective   /80   Wt 75.9 kg (167 lb 6.4 oz)   BMI 30.62 kg/m²     Physical Exam  Vitals and nursing note reviewed. Exam conducted with a chaperone present.   Constitutional:       Appearance: She is well-developed. She is obese.   HENT:      Head: Normocephalic and atraumatic.   Neck:      Thyroid: No thyroid mass or thyromegaly.   Cardiovascular:      Rate and Rhythm: Normal rate and regular rhythm.      Heart sounds: Normal heart sounds. No murmur heard.  Pulmonary:      Effort: Pulmonary effort is normal. No retractions.      Breath sounds: Normal breath sounds. No wheezing, rhonchi or rales.   Chest:      Chest wall: No mass or tenderness.   Breasts:      Right: Normal. No mass, nipple discharge, skin change or tenderness.      Left: Normal. No mass, nipple discharge, skin change or tenderness.       Abdominal:      General:  Bowel sounds are normal.      Palpations: Abdomen is soft. Abdomen is not rigid. There is no mass.      Tenderness: There is no abdominal tenderness. There is no guarding.      Hernia: No hernia is present. There is no hernia in the left inguinal area.   Genitourinary:     Labia:         Right: No rash, tenderness or lesion.         Left: No rash, tenderness or lesion.       Vagina: Normal. No vaginal discharge or lesions.      Cervix: No cervical motion tenderness, discharge, lesion or cervical bleeding.      Uterus: Normal. Not enlarged, not fixed and not tender.       Adnexa:         Right: No mass or tenderness.          Left: No mass or tenderness.        Rectum: No external hemorrhoid.      Comments: Narrow introitus with severe atrophy and inflammation with yellow discharge.  Cervix small and may have vaginal stricture at upper vaginal wall.  Small speculum used and unable to open speculum.  Musculoskeletal:      Cervical back: Normal range of motion. No muscular tenderness.   Neurological:      Mental Status: She is alert and oriented to person, place, and time.   Psychiatric:         Behavior: Behavior normal.            Assessment and Plan    Problem List Items Addressed This Visit     Vaginal atrophy    Overview     Atrophy with inflammation and yellow discharge.  Group B strep culture obtained.  Rx Vagifem.             Type 2 diabetes mellitus, with long-term current use of insulin (HCC)    Overview     Started insulin 2021.            Relevant Medications    metFORMIN (GLUCOPHAGE) 1000 MG tablet    glimepiride (AMARYL) 4 MG tablet    Insulin Glargine (LANTUS SOLOSTAR) 100 UNIT/ML injection pen    Screening for cervical cancer    Overview     Last pap in 2020 at  negative.  Pap with HPV done 7/13/2022           Encounter for screening mammogram for malignant neoplasm of breast    Overview     [facility] 9/9/2021 was negative.            Relevant Orders    Mammo Screening Digital Tomosynthesis Bilateral With  CAD    Screening for colon cancer    Overview     No prior colonoscopy or Cologuard.  7/13/2022.  Wishes to schedule colonoscopy.           Relevant Orders    Ambulatory Referral For Screening Colonoscopy    Obesity (BMI 30-39.9)    Overview     BMI 31             Other Visit Diagnoses     Women's annual routine gynecological examination    -  Primary    Relevant Orders    LIQUID-BASED PAP SMEAR, [facility] ([facility])    Vaginal discharge        Relevant Orders    Group B Streptococcus Culture - Swab, Vagina          1. GYN annual well woman exam.  61 years of age.  On Medicare.   2. Screening for cervical cancer.  States had a Pap smear at  in 2020.  Last Pap smear at our office was 2018.  3. Mammogram due in September.  4. Overdue for colon screening no prior colonoscopy or Cologuard.  Options reviewed patient agrees to schedule colonoscopy.  5. Reviewed monthly self breast exams.  Instructed to call with lumps, pain, or breast discharge.  Yearly mammograms ordered.  6. Ordered mammogram today.  7. Reviewed BMI and weight loss as preventative health measures.   8. Reccommended Flu Vaccine in Fall of each year.  9. RTC in 1 year or PRN with problems.  10. Return in about 1 year (around 7/13/2023) for Annual physical.     Tobi Jordan MD  07/13/2022

## 2022-07-14 ENCOUNTER — PATIENT ROUNDING (BHMG ONLY) (OUTPATIENT)
Dept: OBSTETRICS AND GYNECOLOGY | Facility: CLINIC | Age: 62
End: 2022-07-14

## 2022-07-14 NOTE — PROGRESS NOTES
July 14, 2022    Hello, may I speak with Christi Chapa?    My name is Priscila    I am  with [facility]   [facility]  [address]  [facility] [address] [phone].    Before we get started may I verify your date of birth? 1960    I am calling to officially welcome you to our practice and ask about your recent visit. Is this a good time to talk? yes    Tell me about your visit with us. What things went well?  Everything was fine. She said Dr. Jordan was extremely kind and gentle.       We're always looking for ways to make our patients' experiences even better. Do you have recommendations on ways we may improve?  no    Overall were you satisfied with your first visit to our practice? yes       I appreciate you taking the time to speak with me today. Is there anything else I can do for you? no      Thank you, and have a great day.

## 2022-07-16 LAB — BACTERIA SPEC AEROBE CULT: NORMAL

## 2022-07-18 LAB — REF LAB TEST METHOD: NORMAL

## 2022-07-20 ENCOUNTER — TELEPHONE (OUTPATIENT)
Dept: ENDOCRINOLOGY | Facility: CLINIC | Age: 62
End: 2022-07-20

## 2022-07-20 NOTE — TELEPHONE ENCOUNTER
PT CALLED STATING HER BS IS ABOVE 200 IN [facility]. SHE STATED SHE IS HAVING HEADACHES AND OFTEN TIRED. SHE REQUESTED A CALL BACK.

## 2022-07-20 NOTE — TELEPHONE ENCOUNTER
Called the pt and she stated her sugar was 200 this am fasting. She started taking Jardiance 5 mg she just had it on hand. At one pint she was told to stop it due to yeast infections. Gary readings gave to provider.    Please advise

## 2022-07-20 NOTE — TELEPHONE ENCOUNTER
Spoke to patient. Reviewed her freestyle Gary. Increased her lantus to 46 qhs. Will discuss with Dr. Amin  Signed: Yvonne Benítez MD

## 2022-07-27 DIAGNOSIS — Z01.419 ENCOUNTER FOR ANNUAL ROUTINE GYNECOLOGICAL EXAMINATION: Primary | ICD-10-CM

## 2022-08-03 ENCOUNTER — OFFICE VISIT (OUTPATIENT)
Dept: ENDOCRINOLOGY | Facility: CLINIC | Age: 62
End: 2022-08-03

## 2022-08-03 ENCOUNTER — LAB (OUTPATIENT)
Dept: LAB | Facility: HOSPITAL | Age: 62
End: 2022-08-03

## 2022-08-03 VITALS
BODY MASS INDEX: 30.18 KG/M2 | HEIGHT: 62 IN | OXYGEN SATURATION: 95 % | HEART RATE: 84 BPM | WEIGHT: 164 LBS | SYSTOLIC BLOOD PRESSURE: 128 MMHG | DIASTOLIC BLOOD PRESSURE: 74 MMHG

## 2022-08-03 DIAGNOSIS — E78.5 HYPERLIPIDEMIA, UNSPECIFIED HYPERLIPIDEMIA TYPE: ICD-10-CM

## 2022-08-03 DIAGNOSIS — E11.649 TYPE 2 DIABETES MELLITUS WITH HYPOGLYCEMIA WITHOUT COMA, WITH LONG-TERM CURRENT USE OF INSULIN: Primary | ICD-10-CM

## 2022-08-03 DIAGNOSIS — E11.65 TYPE 2 DIABETES MELLITUS WITH HYPERGLYCEMIA, WITH LONG-TERM CURRENT USE OF INSULIN: ICD-10-CM

## 2022-08-03 DIAGNOSIS — Z79.4 TYPE 2 DIABETES MELLITUS WITH HYPOGLYCEMIA WITHOUT COMA, WITH LONG-TERM CURRENT USE OF INSULIN: Primary | ICD-10-CM

## 2022-08-03 DIAGNOSIS — E03.9 HYPOTHYROIDISM, UNSPECIFIED TYPE: ICD-10-CM

## 2022-08-03 DIAGNOSIS — Z79.4 TYPE 2 DIABETES MELLITUS WITH HYPERGLYCEMIA, WITH LONG-TERM CURRENT USE OF INSULIN: ICD-10-CM

## 2022-08-03 LAB
EXPIRATION DATE: ABNORMAL
EXPIRATION DATE: NORMAL
GLUCOSE BLDC GLUCOMTR-MCNC: 247 MG/DL (ref 70–130)
HBA1C MFR BLD: 7.5 %
Lab: ABNORMAL
Lab: NORMAL

## 2022-08-03 PROCEDURE — 95251 CONT GLUC MNTR ANALYSIS I&R: CPT | Performed by: INTERNAL MEDICINE

## 2022-08-03 PROCEDURE — 83036 HEMOGLOBIN GLYCOSYLATED A1C: CPT | Performed by: INTERNAL MEDICINE

## 2022-08-03 PROCEDURE — 3051F HG A1C>EQUAL 7.0%<8.0%: CPT | Performed by: INTERNAL MEDICINE

## 2022-08-03 PROCEDURE — 99214 OFFICE O/P EST MOD 30 MIN: CPT | Performed by: INTERNAL MEDICINE

## 2022-08-03 RX ORDER — GLIMEPIRIDE 4 MG/1
4 TABLET ORAL 2 TIMES DAILY
Qty: 180 TABLET | Refills: 1 | Status: SHIPPED | OUTPATIENT
Start: 2022-08-03 | End: 2023-01-25

## 2022-08-03 RX ORDER — INSULIN ASPART 100 [IU]/ML
INJECTION, SOLUTION INTRAVENOUS; SUBCUTANEOUS
Qty: 15 ML | Refills: 3 | Status: SHIPPED | OUTPATIENT
Start: 2022-08-03 | End: 2022-09-08 | Stop reason: SDUPTHER

## 2022-08-03 NOTE — PROGRESS NOTES
"Chief Complaint   Patient presents with   • Diabetes        HPI   Christi Chapa is a 61 y.o. female had concerns including Diabetes.      Patient discontinued Ozempic in the interim since last visit due to concern for gastroparesis.  She has contacted the office on multiple occasions has had adjustments to her Lantus dose, currently taking 46 units daily.  She is also resumed use of Jardiance, currently taking 1/2 tablet of 25 mg tablet daily.  She continues on glimepiride 8 mg daily.  She continues to monitor glucose via freestyle kassandra.    Current diabetes medications include the following:  Metformin 1000 mg twice daily  Glimepiride 8 mg daily  Lantus 46 units daily  Jardiance 12.5 milligrams daily    Patient is currently taking levothyroxine 25 mcg daily for hypothyroidism.  She denies missed doses of medication.    The following portions of the patient's history were reviewed and updated as appropriate: allergies, current medications and past social history.    Review of Systems   Gastrointestinal: Negative for abdominal pain, nausea and vomiting.   Endocrine: Negative for polydipsia and polyuria.   Musculoskeletal: Negative for myalgias.      /74 (BP Location: Right arm, Patient Position: Sitting, Cuff Size: Adult)   Pulse 84   Ht 157.5 cm (62\")   Wt 74.4 kg (164 lb)   SpO2 95%   BMI 30.00 kg/m²      Physical Exam      Constitutional:  well developed; well nourished  no acute distress  obese - Body mass index is 30 kg/m².   ENT/Thyroid: not examined   Eyes: Conjunctiva: clear   Respiratory:  breathing is unlabored  clear to auscultation bilaterally   Cardiovascular:  regular rate and rhythm   Chest:  Not performed.   Abdomen: soft, non-tender; no masses   : Not performed.   Musculoskeletal: Not performed   Skin: not performed.   Neuro: mental status, speech normal   Psych: mood and affect are within normal limits       Labs/Imaging   Latest Reference Range & Units 08/03/22 09:54   Glucose 70 - " 130 mg/dL 247 !   Hemoglobin A1C % 7.5   !: Data is abnormal    Freestyle kassandra downloaded and reviewed for dates ranging July 20 to August 2, 2022, average glucose 183 with GMI 7.7%, 22.4% glucose variability.  CGM active 86% of the time.  47% of data within target range, 49% high, 4% very high.  No hypoglycemia noted.  Patient has downtrending blood glucose overnight, glucose rises postprandially.    Diagnoses and all orders for this visit:    1. Type 2 diabetes mellitus with hypoglycemia without coma, with long-term current use of insulin (HCC) (Primary)  Uncontrolled with hemoglobin A1c 7.5%  CGM containing 72 hours of glucose data was downloaded and reviewed  Discussed with patient that she presently does not have adequate mealtime coverage following discontinuation of Ozempic.  We discussed options for improving glycemic control.  We did discuss potential risk of hypoglycemia if Lantus is further increased.  Patient to continue Lantus 46 units daily  Patient to continue glimepiride 8 mg daily  Patient to continue metformin 1000 mg twice daily  Patient to start NovoLog or formulary equivalent 5 units with meals plus correction 2 per 50 greater than 150  Change Jardiance to 10 mg daily, patient previously had vaginal irritation with higher dose.  Patient did not tolerate GLP-1 receptor agonist  Patient was advised to monitor blood sugar 3 times daily if not using CGM.  Patient was instructed to bring glucometer to all future appointments. Patient should contact the clinic between appointments with hypoglycemia or persistent hyperglycemia.  Discussed signs and symptoms of hypoglycemia as well as hypoglycemia management via the rule of 15's.  Discussed potential for long-term complications with uncontrolled diabetes including nephropathy, neuropathy, retinopathy, increased risk for cardiac disease.  Discussed the role of diet and exercise in the management of diabetes.  CMP up-to-date from March 2022 with EGFR  98  Lipid panel up-to-date from January 2022, triglycerides 266, LDL 80  Urine microalbumin up-to-date from September 2021  -     POC Glucose, Blood  -     POC Glycosylated Hemoglobin (Hb A1C)    2. Hyperlipidemia, unspecified hyperlipidemia type  Currently taking pravastatin 40 mg daily.  Patient would like to have fasting lipid panel prior to upcoming cardiology appointment, ordered  -     Lipid Panel    3. Hypothyroidism, unspecified type  Currently taking levothyroxine 25 mcg daily, due for repeat labs, ordered  -     TSH  -     T4, Free      Return in about 6 weeks (around 9/14/2022) for Next scheduled follow up. The patient was instructed to contact the clinic with any interval questions or concerns.    Sarah Amin MD   Endocrinologist    Dictated Utilizing Dragon Dictation

## 2022-08-04 LAB
CHOLEST SERPL-MCNC: 149 MG/DL (ref 0–200)
HDLC SERPL-MCNC: 41 MG/DL (ref 40–60)
LDLC SERPL CALC-MCNC: 69 MG/DL (ref 0–100)
T4 FREE SERPL-MCNC: 1.38 NG/DL (ref 0.93–1.7)
TRIGL SERPL-MCNC: 237 MG/DL (ref 0–150)
TSH SERPL DL<=0.005 MIU/L-ACNC: 1.05 UIU/ML (ref 0.27–4.2)
VLDLC SERPL CALC-MCNC: 39 MG/DL (ref 5–40)

## 2022-08-08 RX ORDER — PRAVASTATIN SODIUM 40 MG
TABLET ORAL
Qty: 90 TABLET | Refills: 0 | Status: SHIPPED | OUTPATIENT
Start: 2022-08-08 | End: 2022-08-16 | Stop reason: SDUPTHER

## 2022-08-08 NOTE — TELEPHONE ENCOUNTER
Rx Refill Note  Requested Prescriptions     Pending Prescriptions Disp Refills   • pravastatin (PRAVACHOL) 40 MG tablet [Pharmacy Med Name: PRAVASTATIN SODIUM 40 MG TAB] 90 tablet 0     Sig: TAKE ONE TABLET BY MOUTH DAILY      Last office visit with prescribing clinician: 5/23/2022      Next office visit with prescribing clinician: 8/23/2022            KEYSHAWN SIFUENTES MA  08/08/22, 09:18 EDT

## 2022-08-08 NOTE — TELEPHONE ENCOUNTER
Had a follow-up session with patient today    Staff RN Kandis had sent me a message stating that patient is feeling too energetic    Patient is taking lamotrigine 25 mg daily for last 2 days    He is not taking any Lexapro    States she is not having any racing thoughts    States he is still having crying spells for no reason    Is going to go to the office today to  samples of Vraylar 1.5 mg that he will take in the evening    Possibility of akathisia was discussed with patient with as needed of over-the-counter Benadryl as a means of having acathisia relief medication    Patient is participating in the program    Continue with supportive therapy    Continue with cognitive behavioral therapy    Continue with medication evaluation and stabilization   I have not seen in 1 year. Needs appt

## 2022-08-11 NOTE — PROGRESS NOTES
"[facility]      Identification: Christi Chapa is a 61 y.o. female who resides in [place], [place]    Reason for visit:  Coronary Artery Disease and Shortness of Breath      Maisha Link returns to the office today in follow-up of her coronary artery disease and risk factors.  She is doing well and denies any cardiovascular complaints of angina or heart failure.  She recently saw Dr. Garcia in the office and lab work was performed.  Her A1c was well controlled and her lipids were reasonably well controlled.  She reminds me she cannot tolerate high intensity statin such as rosuvastatin, atorvastatin, or simvastatin.    Review of Systems   Constitutional: Negative for malaise/fatigue.   Eyes: Negative for vision loss in left eye and vision loss in right eye.   Cardiovascular: Negative for chest pain, dyspnea on exertion, near-syncope, orthopnea, palpitations, paroxysmal nocturnal dyspnea and syncope.   Respiratory: Positive for shortness of breath.    Musculoskeletal: Negative for myalgias.   Neurological: Negative for brief paralysis, excessive daytime sleepiness, focal weakness, numbness, paresthesias and weakness.   All other systems reviewed and are negative.      Objective     /62 (BP Location: Left arm, Patient Position: Sitting)   Pulse 89   Ht 157.5 cm (62\")   Wt 75.3 kg (166 lb)   SpO2 98%   BMI 30.36 kg/m²       Constitutional:       Appearance: Healthy appearance. Well-developed.   Eyes:      General: Lids are normal. No scleral icterus.  HENT:      Head: Normocephalic and atraumatic.   Neck:      Thyroid: No thyroid mass.      Vascular: No carotid bruit or JVD. JVD normal.   Pulmonary:      Effort: Pulmonary effort is normal.      Breath sounds: Normal breath sounds.   Cardiovascular:      Normal rate. Regular rhythm.      Murmurs: There is no murmur.      No gallop.   Musculoskeletal:      Extremities: No clubbing present.Skin:     General: Skin is warm and dry. " There is no cyanosis.   Neurological:      General: No focal deficit present.      Mental Status: Alert.   Psychiatric:         Attention and Perception: Attention normal.         Behavior: Behavior normal. Behavior is cooperative.         Result Review :    Lab Results   Component Value Date    GLUCOSE 288 (H) 03/21/2022    BUN 22 03/21/2022    CREATININE 0.69 03/21/2022    EGFRIFNONA 81 09/20/2021    BCR 31.9 (H) 03/21/2022    K 4.3 03/21/2022    CO2 24.0 03/21/2022    CALCIUM 9.8 03/21/2022    ALBUMIN 4.70 03/21/2022    AST 27 03/21/2022    ALT 36 (H) 03/21/2022     Lab Results   Component Value Date    WBC 9.02 03/21/2022    HGB 13.9 03/21/2022    HCT 43.9 03/21/2022    MCV 85.6 03/21/2022     03/21/2022     Lab Results   Component Value Date    CHOL 166 01/12/2022    CHLPL 149 08/03/2022    TRIG 237 (H) 08/03/2022    HDL 41 08/03/2022    LDL 69 08/03/2022     Lab Results   Component Value Date    HGBA1C 7.5 08/03/2022             Assessment     Problem List Items Addressed This Visit        Cardiology Problems    Coronary artery disease involving native coronary artery of native heart with angina pectoris (HCC) - Primary (Chronic)    Overview     · CT chest (1/30/2020): Severe coronary artery calcifications.   · Nuclear stress test (9/28/2021): No evidence of ischemia. LVEF >70%.          Current Assessment & Plan     · No angina  · Continue low-dose aspirin and statin therapy         Essential hypertension (Chronic)    Overview     • Target blood pressure <130/80 mmHg         Current Assessment & Plan     · Controlled  · Continue present medical therapy         Hyperlipidemia LDL goal <70 (Chronic)    Overview     • High intensity statin therapy indicated given the presence of CAD  • Intolerant to rosuvastatin, atorvastatin, and simvastatin         Current Assessment & Plan     · Well-controlled  · Continue pravastatin  · Unfortunately, intolerant to high intensity statins         Relevant Medications     pravastatin (PRAVACHOL) 40 MG tablet    ezetimibe (ZETIA) 10 MG tablet    fenofibrate (TRICOR) 145 MG tablet       Other    Type 2 diabetes mellitus, with long-term current use of insulin (HCC)    Overview     · Started insulin 2021          Current Assessment & Plan     · Followed by Dr. Amin  · Continue empagliflozin for diabetes and CV risk reduction         Relevant Medications    empagliflozin (Jardiance) 10 MG tablet tablet          Plan   • Continue present medical therapy      Follow-up   Return in about 1 year (around 8/16/2023).        Lemuel Cerrato MD, FACC, [facility]  8/16/2022

## 2022-08-16 ENCOUNTER — OFFICE VISIT (OUTPATIENT)
Dept: CARDIOLOGY | Facility: CLINIC | Age: 62
End: 2022-08-16

## 2022-08-16 VITALS
SYSTOLIC BLOOD PRESSURE: 126 MMHG | DIASTOLIC BLOOD PRESSURE: 62 MMHG | HEART RATE: 89 BPM | OXYGEN SATURATION: 98 % | BODY MASS INDEX: 30.55 KG/M2 | HEIGHT: 62 IN | WEIGHT: 166 LBS

## 2022-08-16 DIAGNOSIS — Z79.4 TYPE 2 DIABETES MELLITUS WITHOUT COMPLICATION, WITH LONG-TERM CURRENT USE OF INSULIN: ICD-10-CM

## 2022-08-16 DIAGNOSIS — I10 ESSENTIAL HYPERTENSION: Chronic | ICD-10-CM

## 2022-08-16 DIAGNOSIS — E11.9 TYPE 2 DIABETES MELLITUS WITHOUT COMPLICATION, WITH LONG-TERM CURRENT USE OF INSULIN: ICD-10-CM

## 2022-08-16 DIAGNOSIS — I25.119 CORONARY ARTERY DISEASE INVOLVING NATIVE CORONARY ARTERY OF NATIVE HEART WITH ANGINA PECTORIS: Primary | Chronic | ICD-10-CM

## 2022-08-16 DIAGNOSIS — E78.5 HYPERLIPIDEMIA LDL GOAL <70: Chronic | ICD-10-CM

## 2022-08-16 PROBLEM — Z12.31 ENCOUNTER FOR SCREENING MAMMOGRAM FOR MALIGNANT NEOPLASM OF BREAST: Status: RESOLVED | Noted: 2022-07-13 | Resolved: 2022-08-16

## 2022-08-16 PROBLEM — R42 DIZZINESS: Status: RESOLVED | Noted: 2022-03-24 | Resolved: 2022-08-16

## 2022-08-16 PROBLEM — H91.92 DECREASED HEARING OF LEFT EAR: Status: RESOLVED | Noted: 2022-05-23 | Resolved: 2022-08-16

## 2022-08-16 PROBLEM — Z00.00 INITIAL MEDICARE ANNUAL WELLNESS VISIT: Status: RESOLVED | Noted: 2022-05-23 | Resolved: 2022-08-16

## 2022-08-16 PROBLEM — R09.81 NASAL SINUS CONGESTION: Status: RESOLVED | Noted: 2022-01-12 | Resolved: 2022-08-16

## 2022-08-16 PROBLEM — H66.003 NON-RECURRENT ACUTE SUPPURATIVE OTITIS MEDIA OF BOTH EARS WITHOUT SPONTANEOUS RUPTURE OF TYMPANIC MEMBRANES: Status: RESOLVED | Noted: 2022-03-24 | Resolved: 2022-08-16

## 2022-08-16 PROBLEM — J03.90 TONSILLITIS: Status: RESOLVED | Noted: 2022-01-12 | Resolved: 2022-08-16

## 2022-08-16 PROBLEM — N60.11 FIBROCYSTIC BREAST CHANGES, BILATERAL: Status: RESOLVED | Noted: 2018-09-05 | Resolved: 2022-08-16

## 2022-08-16 PROBLEM — N60.12 FIBROCYSTIC BREAST CHANGES, BILATERAL: Status: RESOLVED | Noted: 2018-09-05 | Resolved: 2022-08-16

## 2022-08-16 PROBLEM — Z12.4 PAP SMEAR FOR CERVICAL CANCER SCREENING: Status: RESOLVED | Noted: 2022-05-23 | Resolved: 2022-08-16

## 2022-08-16 PROBLEM — Z12.4 SCREENING FOR CERVICAL CANCER: Status: RESOLVED | Noted: 2022-07-13 | Resolved: 2022-08-16

## 2022-08-16 PROBLEM — R30.9 PAINFUL URINATION: Status: RESOLVED | Noted: 2021-11-23 | Resolved: 2022-08-16

## 2022-08-16 PROBLEM — J02.9 SORE THROAT: Status: RESOLVED | Noted: 2022-01-12 | Resolved: 2022-08-16

## 2022-08-16 PROBLEM — N30.01 ACUTE CYSTITIS WITH HEMATURIA: Status: RESOLVED | Noted: 2021-11-23 | Resolved: 2022-08-16

## 2022-08-16 PROBLEM — Z12.11 SCREENING FOR COLON CANCER: Status: RESOLVED | Noted: 2022-07-13 | Resolved: 2022-08-16

## 2022-08-16 PROBLEM — E78.2 MIXED HYPERLIPIDEMIA: Status: RESOLVED | Noted: 2022-01-12 | Resolved: 2022-08-16

## 2022-08-16 PROCEDURE — 99214 OFFICE O/P EST MOD 30 MIN: CPT | Performed by: INTERNAL MEDICINE

## 2022-08-16 RX ORDER — PRAVASTATIN SODIUM 40 MG
40 TABLET ORAL DAILY
Qty: 90 TABLET | Refills: 3 | Status: SHIPPED | OUTPATIENT
Start: 2022-08-16

## 2022-08-16 RX ORDER — EZETIMIBE 10 MG/1
10 TABLET ORAL DAILY
Qty: 90 TABLET | Refills: 3 | Status: SHIPPED | OUTPATIENT
Start: 2022-08-16

## 2022-08-16 RX ORDER — ARIPIPRAZOLE 10 MG/1
TABLET ORAL DAILY
COMMUNITY
Start: 2022-08-08 | End: 2022-10-03 | Stop reason: DRUGHIGH

## 2022-08-16 RX ORDER — FENOFIBRATE 145 MG/1
145 TABLET, COATED ORAL DAILY
Qty: 30 TABLET | Refills: 3 | Status: SHIPPED | OUTPATIENT
Start: 2022-08-16 | End: 2023-03-08

## 2022-08-31 ENCOUNTER — OFFICE VISIT (OUTPATIENT)
Dept: FAMILY MEDICINE CLINIC | Facility: CLINIC | Age: 62
End: 2022-08-31

## 2022-08-31 VITALS
HEART RATE: 76 BPM | RESPIRATION RATE: 19 BRPM | SYSTOLIC BLOOD PRESSURE: 150 MMHG | OXYGEN SATURATION: 98 % | BODY MASS INDEX: 30.18 KG/M2 | DIASTOLIC BLOOD PRESSURE: 60 MMHG | TEMPERATURE: 97.5 F | WEIGHT: 165 LBS

## 2022-08-31 DIAGNOSIS — E11.9 TYPE 2 DIABETES MELLITUS WITHOUT COMPLICATION, WITH LONG-TERM CURRENT USE OF INSULIN: Primary | ICD-10-CM

## 2022-08-31 DIAGNOSIS — I10 ESSENTIAL HYPERTENSION: Chronic | ICD-10-CM

## 2022-08-31 DIAGNOSIS — Z79.4 TYPE 2 DIABETES MELLITUS WITHOUT COMPLICATION, WITH LONG-TERM CURRENT USE OF INSULIN: Primary | ICD-10-CM

## 2022-08-31 DIAGNOSIS — B37.31 YEAST VAGINITIS: ICD-10-CM

## 2022-08-31 PROCEDURE — 99213 OFFICE O/P EST LOW 20 MIN: CPT | Performed by: FAMILY MEDICINE

## 2022-08-31 RX ORDER — FLUCONAZOLE 150 MG/1
150 TABLET ORAL ONCE
Qty: 1 TABLET | Refills: 1 | Status: SHIPPED | OUTPATIENT
Start: 2022-08-31 | End: 2022-08-31

## 2022-08-31 NOTE — PROGRESS NOTES
Subjective   Christi Chapa is a 61 y.o. female.     History of Present Illness endo placed on jardiance . She has already taken 2 yeast meds last week.  She was accidentally taking whole dose of jardiance.       She forgot to take meds today.  For blood pressure.    Her most recent A1c was 7.3.  She is followed by endocrinology for her diabetes.    The following portions of the patient's history were reviewed and updated as appropriate: allergies, current medications, past family history, past medical history, past social history, past surgical history and problem list.    Review of Systems   Constitutional: Negative.    HENT: Negative.    Eyes: Negative.    Respiratory: Negative.    Cardiovascular: Negative.    Gastrointestinal: Negative.    Endocrine: Negative.    Genitourinary: Positive for vaginal discharge and vaginal pain. Negative for menstrual problem and pelvic pain.   Musculoskeletal: Negative.    Skin: Negative.    Allergic/Immunologic: Negative.    Neurological: Negative.    Hematological: Negative.    Psychiatric/Behavioral: Negative.    All other systems reviewed and are negative.      Objective     Vitals:    08/31/22 1132   BP: 150/60   Pulse: 76   Resp: 19   Temp: 97.5 °F (36.4 °C)   SpO2: 98%   Weight: 74.8 kg (165 lb)       Physical Exam  Vitals and nursing note reviewed.   Constitutional:       Appearance: She is well-developed.   HENT:      Head: Normocephalic and atraumatic.   Eyes:      General:         Right eye: No discharge.         Left eye: No discharge.      Pupils: Pupils are equal, round, and reactive to light.   Cardiovascular:      Rate and Rhythm: Normal rate and regular rhythm.      Heart sounds: Normal heart sounds.   Pulmonary:      Effort: Pulmonary effort is normal.      Breath sounds: Normal breath sounds.   Abdominal:      General: Bowel sounds are normal.      Palpations: Abdomen is soft. There is no mass.      Tenderness: There is no abdominal tenderness.    Musculoskeletal:         General: Normal range of motion.      Right shoulder: No swelling.      Cervical back: Normal range of motion and neck supple.   Skin:     General: Skin is warm and dry.      Nails: There is no clubbing.   Neurological:      Mental Status: She is alert and oriented to person, place, and time.      Deep Tendon Reflexes: Reflexes are normal and symmetric.   Psychiatric:         Behavior: Behavior normal.         Thought Content: Thought content normal.         Judgment: Judgment normal.         Assessment & Plan     Problem List Items Addressed This Visit        Cardiac and Vasculature    Essential hypertension (Chronic)    Overview     • Target blood pressure <130/80 mmHg            Endocrine and Metabolic    Type 2 diabetes mellitus, with long-term current use of insulin (HCC) - Primary    Overview     · Started insulin 2021             Genitourinary and Reproductive     Yeast vaginitis    Relevant Medications    fluconazole (Diflucan) 150 MG tablet        Fu recheck bp 4 mo.

## 2022-09-07 LAB — REF LAB TEST METHOD: NORMAL

## 2022-09-08 ENCOUNTER — OFFICE VISIT (OUTPATIENT)
Dept: ENDOCRINOLOGY | Facility: CLINIC | Age: 62
End: 2022-09-08

## 2022-09-08 VITALS
OXYGEN SATURATION: 96 % | HEART RATE: 80 BPM | WEIGHT: 165 LBS | HEIGHT: 62 IN | BODY MASS INDEX: 30.36 KG/M2 | SYSTOLIC BLOOD PRESSURE: 130 MMHG | DIASTOLIC BLOOD PRESSURE: 90 MMHG

## 2022-09-08 DIAGNOSIS — E78.5 HYPERLIPIDEMIA, UNSPECIFIED HYPERLIPIDEMIA TYPE: ICD-10-CM

## 2022-09-08 DIAGNOSIS — E03.9 HYPOTHYROIDISM, UNSPECIFIED TYPE: ICD-10-CM

## 2022-09-08 DIAGNOSIS — Z79.4 TYPE 2 DIABETES MELLITUS WITH HYPERGLYCEMIA, WITH LONG-TERM CURRENT USE OF INSULIN: Primary | ICD-10-CM

## 2022-09-08 DIAGNOSIS — E11.65 TYPE 2 DIABETES MELLITUS WITH HYPERGLYCEMIA, WITH LONG-TERM CURRENT USE OF INSULIN: Primary | ICD-10-CM

## 2022-09-08 LAB
EXPIRATION DATE: ABNORMAL
GLUCOSE BLDC GLUCOMTR-MCNC: 214 MG/DL (ref 70–130)
Lab: ABNORMAL

## 2022-09-08 PROCEDURE — 99214 OFFICE O/P EST MOD 30 MIN: CPT | Performed by: INTERNAL MEDICINE

## 2022-09-08 PROCEDURE — 95251 CONT GLUC MNTR ANALYSIS I&R: CPT | Performed by: INTERNAL MEDICINE

## 2022-09-08 RX ORDER — INSULIN ASPART 100 [IU]/ML
INJECTION, SOLUTION INTRAVENOUS; SUBCUTANEOUS
Qty: 15 ML | Refills: 3 | Status: SHIPPED | OUTPATIENT
Start: 2022-09-08 | End: 2023-01-31 | Stop reason: SDUPTHER

## 2022-09-08 NOTE — PROGRESS NOTES
"Chief Complaint   Patient presents with   • Diabetes        HPI   Christi Chapa is a 61 y.o. female had concerns including Diabetes.      Patient reports that she believes glucose values are improved since initiating short acting insulin at last visit.  She denies any hypoglycemia or symptoms concerning for hypoglycemia.  She continues to monitor glucose via freestyle kassandra.  She has report that glucose values were slightly worsened over the weekend but states that she was traveling and not in her usual routine.  She estimates she has been approximately 8 units of correctional insulin at each meal.    Current diabetes medications include the following:  Lantus 46 units daily  Glimepiride 8 mg daily  Metformin 1000 mg twice daily  NovoLog 5 units with meals plus correction 2 per 50 greater than 150  Jardiance 10 mg daily    Patient continues on pravastatin 40 milligrams daily.  She had follow-up with cardiology in the interim since last visit who did not recommend any changes.    Patient continues on levothyroxine 25 micrograms daily.  She denies missed doses of this medication.    The following portions of the patient's history were reviewed and updated as appropriate: allergies, current medications and past social history.    Review of Systems   Cardiovascular: Negative for palpitations.   Gastrointestinal: Negative for constipation and diarrhea.   Endocrine: Negative for polydipsia and polyuria.   Musculoskeletal: Negative for myalgias.        /90 (BP Location: Right arm, Patient Position: Sitting, Cuff Size: Adult)   Pulse 80   Ht 157.5 cm (62\")   Wt 74.8 kg (165 lb)   SpO2 96%   BMI 30.18 kg/m²      Physical Exam      Constitutional:  well developed; well nourished  no acute distress  obese - Body mass index is 30.18 kg/m².   ENT/Thyroid: not examined   Eyes: Conjunctiva: clear   Respiratory:  breathing is unlabored  clear to auscultation bilaterally   Cardiovascular:  regular rate and rhythm "   Chest:  Not performed.   Abdomen: soft, non-tender; no masses   : Not performed.   Musculoskeletal: Not performed   Skin: not performed.   Neuro: mental status, speech normal   Psych: mood and affect are within normal limits       Labs/Imaging   Latest Reference Range & Units 08/03/22 09:54 09/08/22 09:24   Glucose 70 - 130 mg/dL 247 ! 214 !   Hemoglobin A1C % 7.5    !: Data is abnormal     Latest Reference Range & Units 08/03/22 10:33   TSH Baseline 0.270 - 4.200 uIU/mL 1.050   Free T4 0.93 - 1.70 ng/dL 1.38   Total Cholesterol 0 - 200 mg/dL 149   HDL Cholesterol 40 - 60 mg/dL 41   LDL Cholesterol  0 - 100 mg/dL 69   Triglycerides 0 - 150 mg/dL 237 (H)   VLDL Cholesterol Arnulfo 5 - 40 mg/dL 39   (H): Data is abnormally high    Freestyle kassandra downloaded for review for dates ranging August 26 to September 8, 2022, CGM active 87% of time with average glucose 162, GMI 7.2% with 26.7% glucose variability.  71% of data within target range, 25% high, 4% very high.  Patient with postprandial hyperglycemia, most significant following evening meal.  No pattern of hypoglycemia noted    Diagnoses and all orders for this visit:    1. Type 2 diabetes mellitus with hyperglycemia, with long-term current use of insulin (HCC) (Primary)  Uncontrolled with most recent hemoglobin A1c 7.5%, too soon to repeat  CGM containing 72 hours of glucose data was downloaded and reviewed  Patient with postprandial hyperglycemia, plan to increase NovoLog to 7 units with meals, continue correction 2 per 50 greater than 150  Continue Lantus 46 units daily  Continue glimepiride 8 mg daily  Continue Jardiance 10 mg daily, this is patient's maximum tolerated dose  Patient to continue metformin 1000 mg twice daily  Patient was advised to monitor blood sugar 3 times daily if not using CGM patient was instructed to bring glucometer to all future appointments. Patient should contact the clinic between appointments with hypoglycemia or persistent  hyperglycemia.  Discussed signs and symptoms of hypoglycemia as well as hypoglycemia management via the rule of 15's.  Discussed potential for long-term complications with uncontrolled diabetes including nephropathy, neuropathy, retinopathy, increased risk for cardiac disease.  Discussed the role of diet and exercise in the management of diabetes.  Screening labs are up-to-date  -     POC Glucose, Blood    2. Hyperlipidemia, unspecified hyperlipidemia type  Lipid panel is up-to-date, continue pravastatin    3. Hypothyroidism, unspecified type  Thyroid function testing normal in August 2022, continue levothyroxine 25 mcg daily      Return in about 4 months (around 1/8/2023). The patient was instructed to contact the clinic with any interval questions or concerns.    Sarah Amin MD   Endocrinologist    Dictated Utilizing Dragon Dictation

## 2022-09-09 RX ORDER — SOD SULF/POT CHLORIDE/MAG SULF 1.479 G
1 TABLET ORAL ONCE
Qty: 24 TABLET | Refills: 0 | Status: SHIPPED | OUTPATIENT
Start: 2022-09-09 | End: 2022-09-09

## 2022-09-14 RX ORDER — SOD SULF/POT CHLORIDE/MAG SULF 1.479 G
12 TABLET ORAL ONCE
Qty: 24 TABLET | Refills: 0 | Status: SHIPPED | OUTPATIENT
Start: 2022-09-14 | End: 2022-09-14

## 2022-09-15 ENCOUNTER — HOSPITAL ENCOUNTER (OUTPATIENT)
Dept: MAMMOGRAPHY | Facility: HOSPITAL | Age: 62
Discharge: HOME | End: 2022-09-15
Admitting: OBSTETRICS & GYNECOLOGY

## 2022-09-15 DIAGNOSIS — Z12.31 ENCOUNTER FOR SCREENING MAMMOGRAM FOR MALIGNANT NEOPLASM OF BREAST: ICD-10-CM

## 2022-09-15 PROCEDURE — 77067 SCR MAMMO BI INCL CAD: CPT

## 2022-09-15 PROCEDURE — 77067 SCR MAMMO BI INCL CAD: CPT | Performed by: RADIOLOGY

## 2022-09-15 PROCEDURE — 77063 BREAST TOMOSYNTHESIS BI: CPT

## 2022-09-15 PROCEDURE — 77063 BREAST TOMOSYNTHESIS BI: CPT | Performed by: RADIOLOGY

## 2022-09-20 ENCOUNTER — OUTSIDE FACILITY SERVICE (OUTPATIENT)
Dept: GASTROENTEROLOGY | Facility: CLINIC | Age: 62
End: 2022-09-20

## 2022-09-20 PROCEDURE — 45385 COLONOSCOPY W/LESION REMOVAL: CPT | Performed by: INTERNAL MEDICINE

## 2022-09-20 PROCEDURE — 88305 TISSUE EXAM BY PATHOLOGIST: CPT | Performed by: INTERNAL MEDICINE

## 2022-09-21 ENCOUNTER — LAB REQUISITION (OUTPATIENT)
Dept: LAB | Facility: HOSPITAL | Age: 62
End: 2022-09-21

## 2022-09-21 DIAGNOSIS — Z12.11 ENCOUNTER FOR SCREENING FOR MALIGNANT NEOPLASM OF COLON: ICD-10-CM

## 2022-09-21 DIAGNOSIS — D12.3 BENIGN NEOPLASM OF TRANSVERSE COLON: ICD-10-CM

## 2022-09-22 LAB
CYTO UR: NORMAL
LAB AP CASE REPORT: NORMAL
LAB AP CLINICAL INFORMATION: NORMAL
PATH REPORT.FINAL DX SPEC: NORMAL
PATH REPORT.GROSS SPEC: NORMAL

## 2022-10-03 ENCOUNTER — OFFICE VISIT (OUTPATIENT)
Dept: FAMILY MEDICINE CLINIC | Facility: CLINIC | Age: 62
End: 2022-10-03

## 2022-10-03 VITALS
RESPIRATION RATE: 18 BRPM | BODY MASS INDEX: 30.36 KG/M2 | SYSTOLIC BLOOD PRESSURE: 138 MMHG | DIASTOLIC BLOOD PRESSURE: 60 MMHG | OXYGEN SATURATION: 98 % | TEMPERATURE: 98.6 F | WEIGHT: 166 LBS | HEART RATE: 82 BPM

## 2022-10-03 DIAGNOSIS — I10 ESSENTIAL HYPERTENSION: Primary | Chronic | ICD-10-CM

## 2022-10-03 DIAGNOSIS — I25.119 CORONARY ARTERY DISEASE INVOLVING NATIVE CORONARY ARTERY OF NATIVE HEART WITH ANGINA PECTORIS: Chronic | ICD-10-CM

## 2022-10-03 PROCEDURE — 99213 OFFICE O/P EST LOW 20 MIN: CPT | Performed by: FAMILY MEDICINE

## 2022-10-03 RX ORDER — ARIPIPRAZOLE 15 MG/1
TABLET ORAL
COMMUNITY
Start: 2022-09-27

## 2022-10-03 NOTE — PROGRESS NOTES
Subjective   Christi Chapa is a 61 y.o. female.     History of Present Illness she is following with Endo and had her most recent TSH and A1c on 9/8/2022.  She has endocrinology follow-up in 4 months.  Overall she reports that she has been doing well.  She is not having any chest pain no shortness of breath.  She is tolerating her medications without any side effects.    The following portions of the patient's history were reviewed and updated as appropriate: allergies, current medications, past family history, past medical history, past social history, past surgical history and problem list.    Review of Systems    Objective     Vitals:    10/03/22 1139   BP: 138/60   Pulse: 82   Resp: 18   Temp: 98.6 °F (37 °C)   SpO2: 98%   Weight: 75.3 kg (166 lb)       Physical Exam  Vitals and nursing note reviewed.   Constitutional:       Appearance: She is well-developed.   HENT:      Head: Normocephalic and atraumatic.   Eyes:      General:         Right eye: No discharge.         Left eye: No discharge.      Pupils: Pupils are equal, round, and reactive to light.   Cardiovascular:      Rate and Rhythm: Normal rate and regular rhythm.      Heart sounds: Normal heart sounds.   Pulmonary:      Effort: Pulmonary effort is normal.      Breath sounds: Normal breath sounds.   Abdominal:      General: Bowel sounds are normal.      Palpations: Abdomen is soft. There is no mass.      Tenderness: There is no abdominal tenderness.   Musculoskeletal:         General: Normal range of motion.      Right shoulder: No swelling.      Cervical back: Normal range of motion and neck supple.   Skin:     General: Skin is warm and dry.      Nails: There is no clubbing.   Neurological:      Mental Status: She is alert and oriented to person, place, and time.      Deep Tendon Reflexes: Reflexes are normal and symmetric.   Psychiatric:         Behavior: Behavior normal.         Thought Content: Thought content normal.         Judgment: Judgment  normal.         Assessment & Plan     Problem List Items Addressed This Visit        Cardiac and Vasculature    Essential hypertension - Primary (Chronic)    Overview     • Target blood pressure <130/80 mmHg         Coronary artery disease involving native coronary artery of native heart with angina pectoris (HCC) (Chronic)    Overview     · CT chest (1/30/2020): Severe coronary artery calcifications.   · Nuclear stress test (9/28/2021): No evidence of ischemia. LVEF >70%.                Answers for HPI/ROS submitted by the patient on 9/26/2022  Please describe your symptoms.: Blood pressure is elevated  Have you had these symptoms before?: Yes  How long have you been having these symptoms?: Greater than 2 weeks  Please list any medications you are currently taking for this condition.: Lisinapril  What is the primary reason for your visit?: Other

## 2022-11-02 DIAGNOSIS — I10 ESSENTIAL HYPERTENSION: Chronic | ICD-10-CM

## 2022-11-02 RX ORDER — LISINOPRIL 2.5 MG/1
2.5 TABLET ORAL DAILY
Qty: 30 TABLET | Refills: 3 | Status: SHIPPED | OUTPATIENT
Start: 2022-11-02 | End: 2023-02-28

## 2022-11-02 NOTE — TELEPHONE ENCOUNTER
Caller: SammiRyana Carlotta    Relationship: Self    Best call back number: [phone]     Requested Prescriptions:   Requested Prescriptions     Pending Prescriptions Disp Refills   • lisinopril (PRINIVIL,ZESTRIL) 2.5 MG tablet 30 tablet 3     Sig: Take 1 tablet by mouth Daily.        Pharmacy where request should be sent: [facility] [id number] [address] AT [facility] [phone] [facility] [phone] FX     Additional details provided by patient: PATIENT STATED THAT PHARMACY LET HER KNOW THAT PROVIDER HAS DENIED MEDICATION; BUT WANTED TO SEE IF PCP WOULD TAKE THIS PRESCRIPTION OVER FROM LAST PROVIDER     PLEASE ADVISE    Does the patient have less than a 3 day supply:  [x] Yes  [] No    Arnaldo Almeida Rep   11/02/22 12:23 EDT

## 2022-11-04 RX ORDER — LEVOTHYROXINE SODIUM 0.03 MG/1
25 TABLET ORAL DAILY
Qty: 30 TABLET | Refills: 3 | Status: SHIPPED | OUTPATIENT
Start: 2022-11-04

## 2022-11-07 ENCOUNTER — OFFICE VISIT (OUTPATIENT)
Dept: FAMILY MEDICINE CLINIC | Facility: CLINIC | Age: 62
End: 2022-11-07

## 2022-11-07 ENCOUNTER — LAB (OUTPATIENT)
Dept: LAB | Facility: HOSPITAL | Age: 62
End: 2022-11-07

## 2022-11-07 VITALS
OXYGEN SATURATION: 97 % | RESPIRATION RATE: 17 BRPM | SYSTOLIC BLOOD PRESSURE: 122 MMHG | TEMPERATURE: 98.4 F | WEIGHT: 166.6 LBS | BODY MASS INDEX: 30.66 KG/M2 | DIASTOLIC BLOOD PRESSURE: 66 MMHG | HEIGHT: 62 IN | HEART RATE: 76 BPM

## 2022-11-07 DIAGNOSIS — R30.0 DYSURIA: ICD-10-CM

## 2022-11-07 DIAGNOSIS — R30.0 DYSURIA: Primary | ICD-10-CM

## 2022-11-07 LAB
BILIRUB BLD-MCNC: NEGATIVE MG/DL
CLARITY, POC: CLEAR
COLOR UR: YELLOW
EXPIRATION DATE: ABNORMAL
GLUCOSE UR STRIP-MCNC: ABNORMAL MG/DL
KETONES UR QL: NEGATIVE
LEUKOCYTE EST, POC: ABNORMAL
Lab: ABNORMAL
NITRITE UR-MCNC: NEGATIVE MG/ML
PH UR: 6 [PH] (ref 5–8)
PROT UR STRIP-MCNC: NEGATIVE MG/DL
RBC # UR STRIP: ABNORMAL /UL
SP GR UR: 1.02 (ref 1–1.03)
UROBILINOGEN UR QL: ABNORMAL

## 2022-11-07 PROCEDURE — 99213 OFFICE O/P EST LOW 20 MIN: CPT | Performed by: STUDENT IN AN ORGANIZED HEALTH CARE EDUCATION/TRAINING PROGRAM

## 2022-11-07 PROCEDURE — 81003 URINALYSIS AUTO W/O SCOPE: CPT | Performed by: STUDENT IN AN ORGANIZED HEALTH CARE EDUCATION/TRAINING PROGRAM

## 2022-11-07 RX ORDER — INSULIN ASPART 100 [IU]/ML
INJECTION, SOLUTION INTRAVENOUS; SUBCUTANEOUS
COMMUNITY
Start: 2022-09-15 | End: 2023-01-31

## 2022-11-07 RX ORDER — CEFDINIR 300 MG/1
300 CAPSULE ORAL 2 TIMES DAILY
Qty: 10 CAPSULE | Refills: 0 | Status: SHIPPED | OUTPATIENT
Start: 2022-11-07 | End: 2022-11-12

## 2022-11-07 RX ORDER — BREXPIPRAZOLE 0.5 MG/1
TABLET ORAL
COMMUNITY
Start: 2022-09-14 | End: 2023-01-31

## 2022-11-07 RX ORDER — FLUCONAZOLE 150 MG/1
150 TABLET ORAL ONCE
Qty: 2 TABLET | Refills: 0 | Status: SHIPPED | OUTPATIENT
Start: 2022-11-07 | End: 2022-11-07

## 2022-11-07 NOTE — ASSESSMENT & PLAN NOTE
- Patient with urinary urgency, frequency, and dysuria since Saturday, reports some confusion as well though alert and oriented x3 here in clinic  - UA positive for blood and leukocytes  - We will obtain urine culture and follow-up on results  - We will start treatment for cystitis with cefdinir 300 mg twice daily  - Also sent in fluconazole given that patient reports that she gets yeast infections with antibiotics  - ER precautions given if mental status worsens

## 2022-11-07 NOTE — PROGRESS NOTES
Office Note     Name: Christi Chapa    : 1960     MRN: [id number]     Chief Complaint  Difficulty Urinating and Urinary Frequency    Subjective     History of Present Illness:  Christi Chapa is a 61 y.o. female who presents today for dysuria, frequency, and urigency. Symptoms since Staurday.  Last UTI was earlier this year. Has has some chills, no fevers. Does have frequent kidney stones, not sure when she had her last stone.  Denies any hematuria.  She does feel more confused.          Objective     Past Medical History:   Diagnosis Date   • Anxiety    • Arthritis    • Depression    • Diabetes mellitus (HCC)    • Fibromyalgia, primary    • GERD (gastroesophageal reflux disease)    • Hyperlipidemia    • Hypertension    • Hypothyroidism    • IBS (irritable bowel syndrome)    • New onset of headaches    • Tonsillitis 2022   • Type 2 diabetes mellitus (HCC)      Past Surgical History:   Procedure Laterality Date   • APPENDECTOMY     • BREAST EXCISIONAL BIOPSY Left     [place], [place]   • CHOLECYSTECTOMY     • EYE SURGERY      eye lid surgery    • KIDNEY STONE SURGERY     • RHINOPLASTY     • ROOT CANAL       Family History   Problem Relation Age of Onset   • Diabetes Mother    • COPD Mother    • Hypertension Mother    • Thyroid disease Mother    • Heart disease Mother    • Cancer Father         melanoma   • Melanoma Father    • Diabetes Brother    • Depression Brother    • Anxiety disorder Brother    • Bleeding Disorder Daughter    • Fibromyalgia Daughter    • Obesity Other    • Thyroid disease Other    • Pulmonary embolism Other    • Melanoma Other    • Arthritis Other    • Hyperlipidemia Other    • Cancer Brother    • Melanoma Brother    • Anxiety disorder Brother    • Depression Brother    • Heart attack Brother    • Diabetes Brother    • Anxiety disorder Brother    • Depression Brother    • Heart failure Brother    • Thyroid disease Brother    • Heart disease Brother    • Hypertension  "Brother    • BRCA 1/2 Neg Hx    • Breast cancer Neg Hx    • Colon cancer Neg Hx    • Endometrial cancer Neg Hx    • Ovarian cancer Neg Hx        Vital Signs  /66 (BP Location: Left arm, Patient Position: Sitting, Cuff Size: Adult)   Pulse 76   Temp 98.4 °F (36.9 °C) (Infrared)   Resp 17   Ht 157.5 cm (62\")   Wt 75.6 kg (166 lb 9.6 oz)   SpO2 97%   BMI 30.47 kg/m²   Estimated body mass index is 30.47 kg/m² as calculated from the following:    Height as of this encounter: 157.5 cm (62\").    Weight as of this encounter: 75.6 kg (166 lb 9.6 oz).    Physical Exam  Vitals reviewed.   Constitutional:       Appearance: Normal appearance.   HENT:      Head: Normocephalic and atraumatic.   Cardiovascular:      Rate and Rhythm: Normal rate and regular rhythm.   Pulmonary:      Effort: Pulmonary effort is normal.      Breath sounds: Normal breath sounds.   Abdominal:      General: Abdomen is flat.      Palpations: Abdomen is soft.      Comments: Tender to palpation over suprapubic area, no CVA tenderness   Skin:     General: Skin is warm and dry.   Neurological:      Mental Status: She is alert and oriented to person, place, and time.                 Assessment and Plan     Diagnoses and all orders for this visit:    1. Dysuria (Primary)  Assessment & Plan:  - Patient with urinary urgency, frequency, and dysuria since Saturday, reports some confusion as well though alert and oriented x3 here in clinic  - UA positive for blood and leukocytes  - We will obtain urine culture and follow-up on results  - We will start treatment for cystitis with cefdinir 300 mg twice daily  - Also sent in fluconazole given that patient reports that she gets yeast infections with antibiotics  - ER precautions given if mental status worsens    Orders:  -     POC Urinalysis Dipstick, Automated  -     Urine Culture - Urine, Urine, Clean Catch; Future    Other orders  -     cefdinir (OMNICEF) 300 MG capsule; Take 1 capsule by mouth 2 (Two) " Times a Day for 5 days.  Dispense: 10 capsule; Refill: 0  -     fluconazole (DIFLUCAN) 150 MG tablet; Take 1 tablet by mouth 1 (One) Time for 1 dose. Can take another in 4 days if symptoms unresolved  Dispense: 2 tablet; Refill: 0      Follow Up  Follow-up as scheduled or sooner if needed    Ceci Batista MD

## 2022-11-10 LAB
BACTERIA UR CULT: ABNORMAL
BACTERIA UR CULT: ABNORMAL
OTHER ANTIBIOTIC SUSC ISLT: ABNORMAL

## 2022-12-15 RX ORDER — PEN NEEDLE, DIABETIC 32GX 5/32"
1 NEEDLE, DISPOSABLE MISCELLANEOUS 4 TIMES DAILY
Qty: 150 EACH | Refills: 5 | Status: SHIPPED | OUTPATIENT
Start: 2022-12-15

## 2022-12-27 ENCOUNTER — TELEPHONE (OUTPATIENT)
Dept: ENDOCRINOLOGY | Facility: CLINIC | Age: 62
End: 2022-12-27

## 2023-01-25 RX ORDER — GLIMEPIRIDE 4 MG/1
TABLET ORAL
Qty: 180 TABLET | Refills: 0 | Status: SHIPPED | OUTPATIENT
Start: 2023-01-25

## 2023-01-31 ENCOUNTER — OFFICE VISIT (OUTPATIENT)
Dept: ENDOCRINOLOGY | Facility: CLINIC | Age: 63
End: 2023-01-31
Payer: MEDICARE

## 2023-01-31 VITALS
BODY MASS INDEX: 31.47 KG/M2 | HEART RATE: 90 BPM | HEIGHT: 62 IN | OXYGEN SATURATION: 96 % | WEIGHT: 171 LBS | SYSTOLIC BLOOD PRESSURE: 124 MMHG | DIASTOLIC BLOOD PRESSURE: 66 MMHG

## 2023-01-31 DIAGNOSIS — E78.5 HYPERLIPIDEMIA, UNSPECIFIED HYPERLIPIDEMIA TYPE: ICD-10-CM

## 2023-01-31 DIAGNOSIS — E11.65 TYPE 2 DIABETES MELLITUS WITH HYPERGLYCEMIA, WITH LONG-TERM CURRENT USE OF INSULIN: Primary | ICD-10-CM

## 2023-01-31 DIAGNOSIS — Z79.4 TYPE 2 DIABETES MELLITUS WITH HYPERGLYCEMIA, WITH LONG-TERM CURRENT USE OF INSULIN: Primary | ICD-10-CM

## 2023-01-31 DIAGNOSIS — E03.9 HYPOTHYROIDISM, UNSPECIFIED TYPE: ICD-10-CM

## 2023-01-31 LAB
EXPIRATION DATE: ABNORMAL
EXPIRATION DATE: NORMAL
GLUCOSE BLDC GLUCOMTR-MCNC: 287 MG/DL (ref 70–130)
HBA1C MFR BLD: 9.1 %
Lab: ABNORMAL
Lab: NORMAL

## 2023-01-31 PROCEDURE — 3046F HEMOGLOBIN A1C LEVEL >9.0%: CPT | Performed by: INTERNAL MEDICINE

## 2023-01-31 PROCEDURE — 83036 HEMOGLOBIN GLYCOSYLATED A1C: CPT | Performed by: INTERNAL MEDICINE

## 2023-01-31 PROCEDURE — 99214 OFFICE O/P EST MOD 30 MIN: CPT | Performed by: INTERNAL MEDICINE

## 2023-01-31 PROCEDURE — 95251 CONT GLUC MNTR ANALYSIS I&R: CPT | Performed by: INTERNAL MEDICINE

## 2023-01-31 RX ORDER — DULOXETIN HYDROCHLORIDE 30 MG/1
30 CAPSULE, DELAYED RELEASE ORAL 2 TIMES DAILY
COMMUNITY

## 2023-01-31 RX ORDER — INSULIN ASPART 100 [IU]/ML
INJECTION, SOLUTION INTRAVENOUS; SUBCUTANEOUS
Qty: 60 ML | Refills: 1 | Status: SHIPPED | OUTPATIENT
Start: 2023-01-31

## 2023-01-31 NOTE — PROGRESS NOTES
"Chief Complaint   Patient presents with   • Diabetes        HPI   Christi Chapa is a 62 y.o. female had concerns including Diabetes.      Patient presents for follow-up of diabetes, last seen September 2022. Patient contacted the clinic in the interim between visits secondary to hyperglycemia which she attributed to a respiratory illness.  However, she reports that hyperglycemia has been persistent.  She does report that she has not been following a strict diabetic diet.  She also reports missed doses of NovoLog, specifically at lunch.    Current diabetes medications include the following:  NovoLog 15 units at mealtimes, not using correctional scale  Lantus 46 units daily  Glimepiride 8 mg daily  Jardiance 10 mg daily (patient's maximum tolerated dose).  She does report concerns regarding the cost of this medication but states she does not qualify for assistance.  Metformin 1000 mg twice daily    Patient continues on pravastatin 40 mg daily, denies myalgias    Patient continues on levothyroxine 25 mcg daily.  Denies missed doses.    The following portions of the patient's history were reviewed and updated as appropriate: allergies, current medications and past social history.    Review of Systems   ROS was reviewed and verified. All other ROS negative.    /66 (BP Location: Left arm, Patient Position: Sitting, Cuff Size: Adult)   Pulse 90   Ht 157.5 cm (62\")   Wt 77.6 kg (171 lb)   SpO2 96%   BMI 31.28 kg/m²      Physical Exam      Constitutional:  well developed; well nourished  no acute distress  appears stated age   ENT/Thyroid: not examined   Eyes: Conjunctiva: clear   Respiratory:  breathing is unlabored  clear to auscultation bilaterally   Cardiovascular:  regular rate and rhythm   Chest:  Not performed.   Abdomen: Not performed.   : Not performed.   Musculoskeletal: Not performed   Skin: not performed.   Neuro: mental status, speech normal   Psych: mood and affect are within normal limits "       Labs/Imaging   Latest Reference Range & Units 01/31/23 10:50 01/31/23 10:51   Glucose 70 - 130 mg/dL 287 !    Hemoglobin A1C %  9.1   !: Data is abnormal    Freestyle kassandra downloaded for review for dates ranging January 18 through January 31, 2023, CGM active 69% of the time with average glucose 245, GMI 9.2% with 21.2% glucose variability.  10% of data within target range, 48% high, 42% very high.  Patient is persistently hyperglycemic, this worsens postprandially.  No hypoglycemia noted.    Diagnoses and all orders for this visit:    1. Type 2 diabetes mellitus with hyperglycemia, with long-term current use of insulin (HCC) (Primary)  -     POC Glucose, Blood  -     POC Glycosylated Hemoglobin (Hb A1C)  Uncontrolled with hemoglobin A1c 9.1% worsened from previous  Hyperglycemic during office visit  CGM containing 72 hours of glucose data was downloaded and reviewed  Patient admits to dietary noncompliance, missed doses of medication.  Increase Lantus to 52 units daily  Change NovoLog to 15 units at mealtime plus correction 2 per 50 greater than 150  Continue glimepiride 8 mg daily  Continue Jardiance 10 mg daily, discussed if this medication is not affordable patient needs to contact the clinic and we can discontinue and replace with higher dose of NovoLog  Continue metformin 1000 mg twice daily  Patient was advised to monitor blood sugar 3 times daily if not using CGM.  Patient was instructed to bring glucometer to all future appointments. Patient should contact the clinic between appointments with hypoglycemia or persistent hyperglycemia.  Discussed signs and symptoms of hypoglycemia as well as hypoglycemia management via the rule of 15's.  Discussed potential for long-term complications with uncontrolled diabetes including nephropathy, neuropathy, retinopathy, increased risk for cardiac disease.  Discussed the role of diet and exercise in the management of diabetes.  Screening labs are up-to-date    2.  Hyperlipidemia, unspecified hyperlipidemia type  Lipid panel is up-to-date, continue pravastatin    3. Hypothyroidism, unspecified type  Continue levothyroxine 25 mcg daily    Other orders  -     insulin aspart (NovoLOG FlexPen) 100 UNIT/ML solution pen-injector sc pen; For use at mealtimes and per correctional scale, MDD 60 units  Dispense: 60 mL; Refill: 1  -     Insulin Glargine (LANTUS SOLOSTAR) 100 UNIT/ML injection pen; Inject 52 Units under the skin into the appropriate area as directed Daily.  Dispense: 60 mL; Refill: 1         Return in about 3 months (around 4/30/2023) for Next scheduled follow up. The patient was instructed to contact the clinic with any interval questions or concerns.    Sarah Amin MD   Endocrinologist    Dictated Utilizing Dragon Dictation

## 2023-02-28 DIAGNOSIS — I10 ESSENTIAL HYPERTENSION: Chronic | ICD-10-CM

## 2023-02-28 RX ORDER — LISINOPRIL 2.5 MG/1
TABLET ORAL
Qty: 30 TABLET | Refills: 3 | Status: SHIPPED | OUTPATIENT
Start: 2023-02-28

## 2023-03-08 DIAGNOSIS — E78.5 HYPERLIPIDEMIA LDL GOAL <70: Chronic | ICD-10-CM

## 2023-03-08 RX ORDER — FENOFIBRATE 145 MG/1
TABLET, COATED ORAL
Qty: 90 TABLET | Refills: 3 | Status: SHIPPED | OUTPATIENT
Start: 2023-03-08

## 2023-03-22 DIAGNOSIS — E11.65 TYPE 2 DIABETES MELLITUS WITH HYPERGLYCEMIA, WITH LONG-TERM CURRENT USE OF INSULIN: ICD-10-CM

## 2023-03-22 DIAGNOSIS — Z79.4 TYPE 2 DIABETES MELLITUS WITH HYPERGLYCEMIA, WITH LONG-TERM CURRENT USE OF INSULIN: ICD-10-CM

## 2023-04-24 ENCOUNTER — OFFICE VISIT (OUTPATIENT)
Dept: FAMILY MEDICINE CLINIC | Facility: CLINIC | Age: 63
End: 2023-04-24
Payer: MEDICARE

## 2023-04-24 VITALS
HEIGHT: 62 IN | TEMPERATURE: 98.2 F | OXYGEN SATURATION: 98 % | BODY MASS INDEX: 31.47 KG/M2 | SYSTOLIC BLOOD PRESSURE: 130 MMHG | WEIGHT: 171 LBS | DIASTOLIC BLOOD PRESSURE: 70 MMHG | HEART RATE: 85 BPM | RESPIRATION RATE: 21 BRPM

## 2023-04-24 DIAGNOSIS — M70.61 GREATER TROCHANTERIC BURSITIS OF RIGHT HIP: ICD-10-CM

## 2023-04-24 DIAGNOSIS — E11.9 TYPE 2 DIABETES MELLITUS WITHOUT COMPLICATION, WITH LONG-TERM CURRENT USE OF INSULIN: Primary | ICD-10-CM

## 2023-04-24 DIAGNOSIS — U07.1 COVID-19 VIRUS INFECTION: ICD-10-CM

## 2023-04-24 DIAGNOSIS — Z79.4 TYPE 2 DIABETES MELLITUS WITHOUT COMPLICATION, WITH LONG-TERM CURRENT USE OF INSULIN: Primary | ICD-10-CM

## 2023-04-24 DIAGNOSIS — E66.9 OBESITY (BMI 30-39.9): ICD-10-CM

## 2023-04-24 LAB
EXPIRATION DATE: ABNORMAL
INTERNAL CONTROL: ABNORMAL
Lab: ABNORMAL
SARS-COV-2 AG UPPER RESP QL IA.RAPID: DETECTED

## 2023-04-24 RX ORDER — METHYLPREDNISOLONE ACETATE 80 MG/ML
80 INJECTION, SUSPENSION INTRA-ARTICULAR; INTRALESIONAL; INTRAMUSCULAR; SOFT TISSUE ONCE
Status: COMPLETED | OUTPATIENT
Start: 2023-04-24 | End: 2023-04-24

## 2023-04-24 RX ORDER — METHYLPREDNISOLONE ACETATE 80 MG/ML
80 INJECTION, SUSPENSION INTRA-ARTICULAR; INTRALESIONAL; INTRAMUSCULAR; SOFT TISSUE ONCE
Qty: 1 ML | Refills: 0 | Status: SHIPPED | OUTPATIENT
Start: 2023-04-24 | End: 2023-04-24

## 2023-04-24 RX ADMIN — METHYLPREDNISOLONE ACETATE 80 MG: 80 INJECTION, SUSPENSION INTRA-ARTICULAR; INTRALESIONAL; INTRAMUSCULAR; SOFT TISSUE at 12:06

## 2023-04-24 NOTE — PROGRESS NOTES
Subjective   Christi Chapa is a 62 y.o. female.     History of Present Illness she has been sick for 7 days.  Positive covid in office today.  Her  tested positive for COVID at home.    She reports since flying to [place] she has has been having right hip pain.  She reports the pain is at the lateral right hip and is worse if she touches it.  She has pain when she lays on her right hip.  Overall her COVID symptoms seem to be somewhat improving.  She is not complaining of chest pain or shortness of breath.  She is no longer having fever or chills.  She has continued to try to quarantine longer than the 5 days.    The following portions of the patient's history were reviewed and updated as appropriate: allergies, current medications, past family history, past medical history, past social history, past surgical history and problem list.    Review of Systems   Constitutional: Positive for fatigue. Negative for chills, fever and unexpected weight change.   HENT: Positive for congestion, rhinorrhea, sinus pressure and sore throat. Negative for ear pain, nosebleeds, sneezing and trouble swallowing.    Eyes: Negative for itching and visual disturbance.   Respiratory: Negative for cough, chest tightness, shortness of breath and wheezing.    Cardiovascular: Negative for chest pain, palpitations and leg swelling.   Gastrointestinal: Negative for abdominal pain, anal bleeding, blood in stool, constipation, diarrhea, nausea and vomiting.   Endocrine: Negative for cold intolerance, heat intolerance, polydipsia and polyuria.   Genitourinary: Negative for difficulty urinating, frequency, hematuria and urgency.   Musculoskeletal: Positive for arthralgias and gait problem. Negative for back pain and myalgias.   Skin: Negative for rash and wound.   Neurological: Negative for dizziness, weakness, numbness and headaches.   Hematological: Negative for adenopathy. Does not bruise/bleed easily.   Psychiatric/Behavioral: Negative  "for agitation, confusion, decreased concentration and suicidal ideas. The patient is not nervous/anxious.        Objective     Vitals:    04/24/23 1107   BP: 130/70   Pulse: 85   Resp: 21   Temp: 98.2 °F (36.8 °C)   SpO2: 98%   Weight: 77.6 kg (171 lb)   Height: 157.5 cm (62\")       Physical Exam  Vitals and nursing note reviewed.   Constitutional:       Appearance: She is well-developed.   HENT:      Head: Normocephalic and atraumatic.   Eyes:      General:         Right eye: No discharge.         Left eye: No discharge.      Pupils: Pupils are equal, round, and reactive to light.   Cardiovascular:      Rate and Rhythm: Normal rate and regular rhythm.      Heart sounds: Normal heart sounds.   Pulmonary:      Effort: Pulmonary effort is normal.      Breath sounds: Normal breath sounds.   Abdominal:      General: Bowel sounds are normal.      Palpations: Abdomen is soft. There is no mass.      Tenderness: There is no abdominal tenderness.   Musculoskeletal:         General: Normal range of motion.      Right shoulder: No swelling.      Cervical back: Normal range of motion and neck supple.   Skin:     General: Skin is warm and dry.      Nails: There is no clubbing.   Neurological:      Mental Status: She is alert and oriented to person, place, and time.      Deep Tendon Reflexes: Reflexes are normal and symmetric.   Psychiatric:         Behavior: Behavior normal.         Thought Content: Thought content normal.         Judgment: Judgment normal.         Assessment & Plan     Problem List Items Addressed This Visit        Endocrine and Metabolic    Type 2 diabetes mellitus, with long-term current use of insulin - Primary    Overview     · Started insulin 2021          Obesity (BMI 30-39.9)    Overview     BMI 31            Other    COVID-19 virus infection   Other Visit Diagnoses     Greater trochanteric bursitis of right hip        Relevant Medications    methylPREDNISolone acetate (DEPO-medrol) injection 80 mg " (Completed)

## 2023-04-26 RX ORDER — GLIMEPIRIDE 4 MG/1
TABLET ORAL
Qty: 180 TABLET | Refills: 0 | Status: SHIPPED | OUTPATIENT
Start: 2023-04-26

## 2023-04-26 NOTE — TELEPHONE ENCOUNTER
Rx Refill Note  Requested Prescriptions     Pending Prescriptions Disp Refills   • glimepiride (AMARYL) 4 MG tablet [Pharmacy Med Name: GLIMEPIRIDE 4 MG TABLET] 180 tablet 0     Sig: TAKE ONE TABLET BY MOUTH TWICE A DAY      Last office visit with prescribing clinician: 1/31/2023     Next office visit with prescribing clinician: 5/19/2023

## 2023-04-27 ENCOUNTER — TELEPHONE (OUTPATIENT)
Dept: FAMILY MEDICINE CLINIC | Facility: CLINIC | Age: 63
End: 2023-04-27

## 2023-04-27 NOTE — TELEPHONE ENCOUNTER
PHARMACY RECEIVED PRESCRIPTION FOR METHYLTREDNISOLONE. PATIENT'S  BELIEVES SHE HAD THIS WHILE IN THE OFFICE. THEY WANT TO SEE IF THIS WAS AN ERROR.    PHONE: [phone]

## 2023-05-01 NOTE — TELEPHONE ENCOUNTER
Called [facility] pharmacy and told them that the prescription for methylprednisolone can be closed.

## 2023-05-10 ENCOUNTER — LAB (OUTPATIENT)
Dept: LAB | Facility: HOSPITAL | Age: 63
End: 2023-05-10
Payer: MEDICARE

## 2023-05-10 ENCOUNTER — OFFICE VISIT (OUTPATIENT)
Dept: FAMILY MEDICINE CLINIC | Facility: CLINIC | Age: 63
End: 2023-05-10
Payer: MEDICARE

## 2023-05-10 VITALS
SYSTOLIC BLOOD PRESSURE: 138 MMHG | HEART RATE: 88 BPM | RESPIRATION RATE: 21 BRPM | DIASTOLIC BLOOD PRESSURE: 70 MMHG | WEIGHT: 171.6 LBS | OXYGEN SATURATION: 95 % | TEMPERATURE: 97.6 F | HEIGHT: 62 IN | BODY MASS INDEX: 31.58 KG/M2

## 2023-05-10 DIAGNOSIS — N30.90 CYSTITIS: Primary | ICD-10-CM

## 2023-05-10 DIAGNOSIS — R30.0 DYSURIA: ICD-10-CM

## 2023-05-10 LAB
BILIRUB BLD-MCNC: NEGATIVE MG/DL
CLARITY, POC: ABNORMAL
COLOR UR: YELLOW
EXPIRATION DATE: ABNORMAL
GLUCOSE UR STRIP-MCNC: ABNORMAL MG/DL
KETONES UR QL: NEGATIVE
LEUKOCYTE EST, POC: NEGATIVE
Lab: ABNORMAL
NITRITE UR-MCNC: NEGATIVE MG/ML
PH UR: 6 [PH] (ref 5–8)
PROT UR STRIP-MCNC: NEGATIVE MG/DL
RBC # UR STRIP: ABNORMAL /UL
SP GR UR: 1.01 (ref 1–1.03)
UROBILINOGEN UR QL: ABNORMAL

## 2023-05-10 PROCEDURE — 99213 OFFICE O/P EST LOW 20 MIN: CPT | Performed by: STUDENT IN AN ORGANIZED HEALTH CARE EDUCATION/TRAINING PROGRAM

## 2023-05-10 PROCEDURE — 3075F SYST BP GE 130 - 139MM HG: CPT | Performed by: STUDENT IN AN ORGANIZED HEALTH CARE EDUCATION/TRAINING PROGRAM

## 2023-05-10 PROCEDURE — 1160F RVW MEDS BY RX/DR IN RCRD: CPT | Performed by: STUDENT IN AN ORGANIZED HEALTH CARE EDUCATION/TRAINING PROGRAM

## 2023-05-10 PROCEDURE — 87086 URINE CULTURE/COLONY COUNT: CPT

## 2023-05-10 PROCEDURE — 3046F HEMOGLOBIN A1C LEVEL >9.0%: CPT | Performed by: STUDENT IN AN ORGANIZED HEALTH CARE EDUCATION/TRAINING PROGRAM

## 2023-05-10 PROCEDURE — 81003 URINALYSIS AUTO W/O SCOPE: CPT | Performed by: STUDENT IN AN ORGANIZED HEALTH CARE EDUCATION/TRAINING PROGRAM

## 2023-05-10 PROCEDURE — 1159F MED LIST DOCD IN RCRD: CPT | Performed by: STUDENT IN AN ORGANIZED HEALTH CARE EDUCATION/TRAINING PROGRAM

## 2023-05-10 PROCEDURE — 3078F DIAST BP <80 MM HG: CPT | Performed by: STUDENT IN AN ORGANIZED HEALTH CARE EDUCATION/TRAINING PROGRAM

## 2023-05-10 RX ORDER — INSULIN ASPART 100 [IU]/ML
INJECTION, SOLUTION INTRAVENOUS; SUBCUTANEOUS
COMMUNITY
Start: 2023-02-08

## 2023-05-10 RX ORDER — FLUCONAZOLE 150 MG/1
150 TABLET ORAL ONCE
Qty: 2 TABLET | Refills: 0 | Status: SHIPPED | OUTPATIENT
Start: 2023-05-10 | End: 2023-05-10

## 2023-05-10 RX ORDER — DULOXETIN HYDROCHLORIDE 60 MG/1
CAPSULE, DELAYED RELEASE ORAL
COMMUNITY
Start: 2023-04-21

## 2023-05-10 RX ORDER — NITROFURANTOIN 25; 75 MG/1; MG/1
100 CAPSULE ORAL EVERY 12 HOURS SCHEDULED
Qty: 10 CAPSULE | Refills: 0 | Status: SHIPPED | OUTPATIENT
Start: 2023-05-10 | End: 2023-05-15

## 2023-05-10 RX ORDER — ALPRAZOLAM 0.5 MG/1
TABLET ORAL
COMMUNITY
Start: 2023-05-02

## 2023-05-10 NOTE — ASSESSMENT & PLAN NOTE
- Point-of-care UA only positive for blood, but given patient having very distinct urinary tract infection symptoms, we will go ahead and treat as cystitis  - Obtaining urine culture  -We will start Macrobid for 5 days  -Sending fluconazole given that patient gets yeast infections with antibiotics  -Patient to return to clinic if no improvement or worsening symptoms

## 2023-05-10 NOTE — PROGRESS NOTES
Office Note     Name: Christi Chapa    : 1960     MRN: [id number]     Chief Complaint  Urinary Frequency (Started Saturday ) and Urinary Urgency (Pt has pressure and burning as well )    Subjective     History of Present Illness:  Christi Chapa is a 62 y.o. female who presents today for urinary frequency, urgency and burning with urination.  Patient reports that her frequency symptoms started on Saturday.  Patient reports that she has been nauseous but has not had any vomiting.  Patient's last UTI was in November of last year.          Objective     Past Medical History:   Diagnosis Date   • Anxiety    • Arthritis    • Cataract    • Coronary artery disease    • Depression    • Diabetes mellitus    • Fibromyalgia, primary    • GERD (gastroesophageal reflux disease)    • Hyperlipidemia    • Hypertension    • Hypothyroidism    • IBS (irritable bowel syndrome)    • Kidney stone    • New onset of headaches    • Obesity    • Tonsillitis 2022   • Type 2 diabetes mellitus      Past Surgical History:   Procedure Laterality Date   • APPENDECTOMY     • BREAST EXCISIONAL BIOPSY Left     [place], [place]   • CHOLECYSTECTOMY     • COLONOSCOPY     • COSMETIC SURGERY     • EYE SURGERY      eye lid surgery    • KIDNEY STONE SURGERY     • RHINOPLASTY     • ROOT CANAL       Family History   Problem Relation Age of Onset   • Diabetes Mother    • COPD Mother    • Hypertension Mother    • Thyroid disease Mother    • Heart disease Mother    • Depression Mother    • Cancer Father         Melinoma   • Melanoma Father    • Diabetes Brother    • Depression Brother    • Anxiety disorder Brother    • Bleeding Disorder Daughter    • Fibromyalgia Daughter    • Obesity Other    • Thyroid disease Other    • Pulmonary embolism Other    • Melanoma Other    • Arthritis Other    • Hyperlipidemia Other    • Cancer Brother    • Melanoma Brother    • Anxiety disorder Brother    • Depression Brother    • Heart attack Brother    •  "Diabetes Brother    • Anxiety disorder Brother    • Depression Brother    • Heart failure Brother    • Thyroid disease Brother    • Heart disease Brother    • Hypertension Brother    • BRCA 1/2 Neg Hx    • Breast cancer Neg Hx    • Colon cancer Neg Hx    • Endometrial cancer Neg Hx    • Ovarian cancer Neg Hx        Vital Signs  /70   Pulse 88   Temp 97.6 °F (36.4 °C) (Infrared)   Resp 21   Ht 157.5 cm (62\")   Wt 77.8 kg (171 lb 9.6 oz)   SpO2 95%   BMI 31.39 kg/m²   Estimated body mass index is 31.39 kg/m² as calculated from the following:    Height as of this encounter: 157.5 cm (62\").    Weight as of this encounter: 77.8 kg (171 lb 9.6 oz).    Physical Exam  Vitals reviewed.   Constitutional:       Appearance: Normal appearance.   HENT:      Head: Normocephalic and atraumatic.   Cardiovascular:      Rate and Rhythm: Normal rate and regular rhythm.   Pulmonary:      Effort: Pulmonary effort is normal.      Breath sounds: Normal breath sounds.   Abdominal:      General: Abdomen is flat.      Palpations: Abdomen is soft.      Tenderness: There is abdominal tenderness.      Comments: To palpation right lower quadrant and suprapubically, no CVA tenderness   Neurological:      Mental Status: She is alert.               Assessment and Plan     Diagnoses and all orders for this visit:    1. Cystitis (Primary)  Assessment & Plan:  - Point-of-care UA only positive for blood, but given patient having very distinct urinary tract infection symptoms, we will go ahead and treat as cystitis  - Obtaining urine culture  -We will start Macrobid for 5 days  -Sending fluconazole given that patient gets yeast infections with antibiotics  -Patient to return to clinic if no improvement or worsening symptoms    Orders:  -     POC Urinalysis Dipstick, Automated  -     Urine Culture - Urine, Urine, Clean Catch; Future    Other orders  -     nitrofurantoin, macrocrystal-monohydrate, (MACROBID) 100 MG capsule; Take 1 capsule by " mouth Every 12 (Twelve) Hours for 5 days.  Dispense: 10 capsule; Refill: 0  -     fluconazole (DIFLUCAN) 150 MG tablet; Take 1 tablet by mouth 1 (One) Time for 1 dose. Repeat dose in 3 days if no improvement in symptoms  Dispense: 2 tablet; Refill: 0    Follow Up  No follow-ups on file.    Ceci Batista MD

## 2023-05-11 LAB — BACTERIA SPEC AEROBE CULT: NO GROWTH

## 2023-05-19 ENCOUNTER — OFFICE VISIT (OUTPATIENT)
Dept: ENDOCRINOLOGY | Facility: CLINIC | Age: 63
End: 2023-05-19
Payer: MEDICARE

## 2023-05-19 VITALS
HEIGHT: 62 IN | BODY MASS INDEX: 31.47 KG/M2 | DIASTOLIC BLOOD PRESSURE: 76 MMHG | WEIGHT: 171 LBS | OXYGEN SATURATION: 96 % | HEART RATE: 96 BPM | SYSTOLIC BLOOD PRESSURE: 136 MMHG

## 2023-05-19 DIAGNOSIS — E03.9 HYPOTHYROIDISM, UNSPECIFIED TYPE: ICD-10-CM

## 2023-05-19 DIAGNOSIS — E78.5 HYPERLIPIDEMIA, UNSPECIFIED HYPERLIPIDEMIA TYPE: ICD-10-CM

## 2023-05-19 DIAGNOSIS — E11.65 TYPE 2 DIABETES MELLITUS WITH HYPERGLYCEMIA, WITH LONG-TERM CURRENT USE OF INSULIN: Primary | ICD-10-CM

## 2023-05-19 DIAGNOSIS — Z79.4 TYPE 2 DIABETES MELLITUS WITH HYPERGLYCEMIA, WITH LONG-TERM CURRENT USE OF INSULIN: Primary | ICD-10-CM

## 2023-05-19 LAB
EXPIRATION DATE: ABNORMAL
EXPIRATION DATE: NORMAL
GLUCOSE BLDC GLUCOMTR-MCNC: 276 MG/DL (ref 70–130)
HBA1C MFR BLD: 10.4 %
Lab: ABNORMAL
Lab: NORMAL

## 2023-05-19 RX ORDER — TIRZEPATIDE 2.5 MG/.5ML
2.5 INJECTION, SOLUTION SUBCUTANEOUS WEEKLY
Qty: 2 ML | Refills: 1 | Status: SHIPPED | OUTPATIENT
Start: 2023-05-19

## 2023-05-19 NOTE — PROGRESS NOTES
"Chief Complaint   Patient presents with   • Diabetes        HPI   Christi Chapa is a 62 y.o. female had concerns including Diabetes.      Patient presents for follow-up of diabetes, last seen January 2023.  Patient notes that she has not been good about taking all of her medications in the interim since last visit.  Specifically she notes that she frequently misses Humalog.  She also did not increase Lantus as advised following last visit.  Patient also reports that she does not eat like she should.  She reports that she feels hungry all the time.    current diabetes medications include the following  Lantus 46 units daily  Humalog 15 units with meals plus correction  Glimepiride 8 mg daily  Jardiance 10 mg daily  Metformin 1000 mg twice daily    Ozempic previously discontinued secondary to worsening gastroparesis.  However, patient reports desire for trial of alternative medication within this class.     Patient continues on pravastatin for hyperlipidemia    Patient continues on levothyroxine for hypothyroidism, denies missed doses.    The following portions of the patient's history were reviewed and updated as appropriate: allergies, current medications and past social history.    Review of Systems   Gastrointestinal: Negative for nausea and vomiting.   Endocrine: Negative for polydipsia and polyuria.   Musculoskeletal: Negative for myalgias.      /76 (BP Location: Right arm, Patient Position: Sitting, Cuff Size: Adult)   Pulse 96   Ht 157.5 cm (62\")   Wt 77.6 kg (171 lb)   SpO2 96%   BMI 31.28 kg/m²      Physical Exam      Constitutional:  well developed; well nourished  no acute distress  obese - Body mass index is 31.28 kg/m².   ENT/Thyroid: not examined   Eyes: Conjunctiva: clear   Respiratory:  breathing is unlabored  clear to auscultation bilaterally   Cardiovascular:  regular rate and rhythm   Chest:  Not performed.   Abdomen: Not performed.   : Not performed.   Musculoskeletal: Not " performed   Skin: not performed.   Neuro: mental status, speech normal   Psych: mood and affect are within normal limits       Labs/Imaging   Latest Reference Range & Units 05/19/23 14:10   Glucose 70 - 130 mg/dL 276 !   Hemoglobin A1C % 10.4   !: Data is abnormal    Freestyle kassandra downloaded for review for dates ranging May 6 through May 19, 2023, CGM active 68% of the time with average glucose 277, GMI 9.9% with 19.6% glucose variability.  2% of data within target range, 31% high, 67% very high.  Patient is persistently hyperglycemic with transient worsening that correlates with mealtimes.  No hypoglycemia.    Diagnoses and all orders for this visit:    1. Type 2 diabetes mellitus with hyperglycemia, with long-term current use of insulin (Primary)  Diabetes is poorly controlled with hemoglobin A1c 10.4%, worsened from prior  Patient not taking medications as instructed  Patient reports interest in trial of alternative GLP-1 receptor agonist.  Discussed that she may have similar adverse effects that she experienced with Ozempic, Trulicity.  Patient voiced understanding.  Prescription sent for Mounjaro 2.5 mg weekly.  Reviewed that this may be titrated monthly if she is tolerating without issue and remains hyperglycemic.  Patient to increase Lantus to 52 units daily  Patient to continue glimepiride 8 mg daily  Patient to continue Jardiance 10 mg daily  Patient to continue metformin 1000 mg twice daily  Given addition of GLP-1 and noncompliance with mealtime insulin.  Change Humalog to per correctional scale 2 per 50 greater than 150.  Patient was advised to monitor blood sugar 3 times daily by using CGM.  Patient was instructed to bring glucometer to all future appointments. Patient should contact the clinic between appointments with hypoglycemia or persistent hyperglycemia.  Discussed signs and symptoms of hypoglycemia as well as hypoglycemia management via the rule of 15's.  Discussed potential for long-term  complications with uncontrolled diabetes including nephropathy, neuropathy, retinopathy, increased risk for cardiac disease.  Discussed the role of diet and exercise in the management of diabetes.  Thyroid function testing was normal in August 2022  Lipid panel up-to-date from August 2022, LDL 69  Repeat CMP due, last March 2022  Urine microalbumin due  Discussed obtaining repeat labs today, patient would like to defer and complete all testing at next visit.  -     POC Glucose, Blood  -     POC Glycosylated Hemoglobin (Hb A1C)    2. Hyperlipidemia, unspecified hyperlipidemia type  Repeat lipid panel next visit, continue pravastatin    3. Hypothyroidism, unspecified type  Thyroid function testing was normal in August 2022, continue current levothyroxine    Other orders  -     Tirzepatide (Mounjaro) 2.5 MG/0.5ML solution pen-injector; Inject 0.5 mL under the skin into the appropriate area as directed 1 (One) Time Per Week.  Dispense: 2 mL; Refill: 1      Return in about 3 months (around 8/19/2023) for Next scheduled follow up. The patient was instructed to contact the clinic with any interval questions or concerns.    Sarah Amin MD   Endocrinologist    Dictated Utilizing Dragon Dictation

## 2023-06-05 ENCOUNTER — OFFICE VISIT (OUTPATIENT)
Dept: FAMILY MEDICINE CLINIC | Facility: CLINIC | Age: 63
End: 2023-06-05
Payer: MEDICARE

## 2023-06-05 ENCOUNTER — LAB (OUTPATIENT)
Dept: LAB | Facility: HOSPITAL | Age: 63
End: 2023-06-05
Payer: MEDICARE

## 2023-06-05 VITALS
TEMPERATURE: 97.3 F | WEIGHT: 171 LBS | BODY MASS INDEX: 31.47 KG/M2 | HEART RATE: 75 BPM | OXYGEN SATURATION: 95 % | SYSTOLIC BLOOD PRESSURE: 150 MMHG | RESPIRATION RATE: 20 BRPM | HEIGHT: 62 IN | DIASTOLIC BLOOD PRESSURE: 70 MMHG

## 2023-06-05 DIAGNOSIS — Z23 IMMUNIZATION DUE: ICD-10-CM

## 2023-06-05 DIAGNOSIS — Z00.00 MEDICARE ANNUAL WELLNESS VISIT, SUBSEQUENT: Primary | ICD-10-CM

## 2023-06-05 DIAGNOSIS — E03.9 HYPOTHYROIDISM, UNSPECIFIED TYPE: ICD-10-CM

## 2023-06-05 DIAGNOSIS — Z79.4 TYPE 2 DIABETES MELLITUS WITHOUT COMPLICATION, WITH LONG-TERM CURRENT USE OF INSULIN: ICD-10-CM

## 2023-06-05 DIAGNOSIS — E55.9 VITAMIN D DEFICIENCY: ICD-10-CM

## 2023-06-05 DIAGNOSIS — F32.A DEPRESSION, UNSPECIFIED DEPRESSION TYPE: ICD-10-CM

## 2023-06-05 DIAGNOSIS — Z00.00 MEDICARE ANNUAL WELLNESS VISIT, SUBSEQUENT: ICD-10-CM

## 2023-06-05 DIAGNOSIS — M25.552 LEFT HIP PAIN: ICD-10-CM

## 2023-06-05 DIAGNOSIS — E11.9 TYPE 2 DIABETES MELLITUS WITHOUT COMPLICATION, WITH LONG-TERM CURRENT USE OF INSULIN: ICD-10-CM

## 2023-06-05 LAB
25(OH)D3+25(OH)D2 SERPL-MCNC: 23.7 NG/ML (ref 30–100)
ALBUMIN SERPL-MCNC: 4.6 G/DL (ref 3.5–5.2)
ALBUMIN/GLOB SERPL: 2 G/DL
ALP SERPL-CCNC: 48 U/L (ref 39–117)
ALT SERPL-CCNC: 44 U/L (ref 1–33)
AST SERPL-CCNC: 38 U/L (ref 1–32)
BASOPHILS # BLD AUTO: 0.04 10*3/MM3 (ref 0–0.2)
BASOPHILS NFR BLD AUTO: 0.7 % (ref 0–1.5)
BILIRUB SERPL-MCNC: 0.2 MG/DL (ref 0–1.2)
BUN SERPL-MCNC: 18 MG/DL (ref 8–23)
BUN/CREAT SERPL: 23.1 (ref 7–25)
CALCIUM SERPL-MCNC: 10.4 MG/DL (ref 8.6–10.5)
CHLORIDE SERPL-SCNC: 100 MMOL/L (ref 98–107)
CHOLEST SERPL-MCNC: 140 MG/DL (ref 0–200)
CO2 SERPL-SCNC: 26.1 MMOL/L (ref 22–29)
CREAT SERPL-MCNC: 0.78 MG/DL (ref 0.57–1)
EGFRCR SERPLBLD CKD-EPI 2021: 85.5 ML/MIN/1.73
EOSINOPHIL # BLD AUTO: 0 10*3/MM3 (ref 0–0.4)
EOSINOPHIL NFR BLD AUTO: 0 % (ref 0.3–6.2)
ERYTHROCYTE [DISTWIDTH] IN BLOOD BY AUTOMATED COUNT: 15 % (ref 12.3–15.4)
GLOBULIN SER CALC-MCNC: 2.3 GM/DL
GLUCOSE SERPL-MCNC: 244 MG/DL (ref 65–99)
HCT VFR BLD AUTO: 41.4 % (ref 34–46.6)
HDLC SERPL-MCNC: 39 MG/DL (ref 40–60)
HGB BLD-MCNC: 13 G/DL (ref 12–15.9)
IMM GRANULOCYTES # BLD AUTO: 0.02 10*3/MM3 (ref 0–0.05)
IMM GRANULOCYTES NFR BLD AUTO: 0.3 % (ref 0–0.5)
LDLC SERPL CALC-MCNC: 66 MG/DL (ref 0–100)
LYMPHOCYTES # BLD AUTO: 2.11 10*3/MM3 (ref 0.7–3.1)
LYMPHOCYTES NFR BLD AUTO: 35.3 % (ref 19.6–45.3)
MCH RBC QN AUTO: 24.9 PG (ref 26.6–33)
MCHC RBC AUTO-ENTMCNC: 31.4 G/DL (ref 31.5–35.7)
MCV RBC AUTO: 79.2 FL (ref 79–97)
MONOCYTES # BLD AUTO: 0.41 10*3/MM3 (ref 0.1–0.9)
MONOCYTES NFR BLD AUTO: 6.9 % (ref 5–12)
NEUTROPHILS # BLD AUTO: 3.4 10*3/MM3 (ref 1.7–7)
NEUTROPHILS NFR BLD AUTO: 56.8 % (ref 42.7–76)
NRBC BLD AUTO-RTO: 0 /100 WBC (ref 0–0.2)
PLATELET # BLD AUTO: 279 10*3/MM3 (ref 140–450)
POTASSIUM SERPL-SCNC: 4.9 MMOL/L (ref 3.5–5.2)
PROT SERPL-MCNC: 6.9 G/DL (ref 6–8.5)
RBC # BLD AUTO: 5.23 10*6/MM3 (ref 3.77–5.28)
SODIUM SERPL-SCNC: 139 MMOL/L (ref 136–145)
TRIGL SERPL-MCNC: 215 MG/DL (ref 0–150)
TSH SERPL DL<=0.005 MIU/L-ACNC: 1.15 UIU/ML (ref 0.27–4.2)
VLDLC SERPL CALC-MCNC: 35 MG/DL (ref 5–40)
WBC # BLD AUTO: 5.98 10*3/MM3 (ref 3.4–10.8)

## 2023-06-05 NOTE — PROGRESS NOTES
The ABCs of the Annual Wellness Visit  Subsequent Medicare Wellness Visit    Chief Complaint   Patient presents with    Medicare Wellness-subsequent      Subjective    History of Present Illness:  Christi Chapa is a 62 y.o. female who presents for a Subsequent Medicare Wellness Visit.    The following portions of the patient's history were reviewed and   updated as appropriate: allergies, current medications, past family history, past medical history, past social history, past surgical history, and problem list.    Compared to one year ago, the patient feels her physical   health is worse. A1c more elevated.  Hip pains, left more now right hip got better after shot.      Compared to one year ago, the patient feels her mental   health is the same.    Recent Hospitalizations:  She was not admitted to the hospital during the last year.       Current Medical Providers:  Patient Care Team:  Emi Martinez MD as PCP - General (Family Medicine)  Ant Galvan MD (Inactive) as Consulting Physician (Gynecology)  Sarah Amin MD as Consulting Physician (Endocrinology)  Cesar Cerrato IV, MD as Consulting Physician (Interventional Cardiology)    Outpatient Medications Prior to Visit   Medication Sig Dispense Refill    acetaminophen (TYLENOL) 500 MG tablet Take 2 tablets by mouth Every 6 (Six) Hours As Needed for Mild Pain  or Moderate Pain . 30 tablet 0    ALPRAZolam (XANAX) 0.5 MG tablet 1 tablet. Takes 2 in the am and half in pm      ARIPiprazole (ABILIFY) 15 MG tablet Half tab po daily      aspirin (aspirin) 81 MG EC tablet Take 1 tablet by mouth Daily. 90 tablet 3    Blood Glucose Monitoring Suppl device For use with glucose monitoring, daily; E11.65 1 each 0    Continuous Blood Gluc Sensor (FreeStyle Gary 2 Sensor) misc 1 each Every 14 (Fourteen) Days. 9 each 3    CRANBERRY CONCENTRATE PO Take  by mouth.      DULoxetine (CYMBALTA) 60 MG capsule       empagliflozin (Jardiance) 10 MG tablet  tablet Take 1 tablet by mouth Daily. 30 tablet 3    estradiol (Yuvafem) 10 MCG tablet vaginal tablet Insert 1 tablet into the vagina 2 (Two) Times a Week. 8 tablet 11    ezetimibe (ZETIA) 10 MG tablet Take 1 tablet by mouth Daily. 90 tablet 3    fenofibrate (TRICOR) 145 MG tablet TAKE ONE TABLET BY MOUTH DAILY 90 tablet 3    glimepiride (AMARYL) 4 MG tablet TAKE ONE TABLET BY MOUTH TWICE A  tablet 0    glucose blood (True Metrix Blood Glucose Test) test strip Use as instructed 3 times daily 100 each 11    ibuprofen (ADVIL,MOTRIN) 600 MG tablet Take 1 tablet by mouth Every 8 (Eight) Hours As Needed for Mild Pain . 30 tablet 0    insulin aspart (NovoLOG FlexPen) 100 UNIT/ML solution pen-injector sc pen For use at mealtimes and per correctional scale, MDD 60 units 60 mL 1    Insulin Glargine (LANTUS SOLOSTAR) 100 UNIT/ML injection pen Inject 52 Units under the skin into the appropriate area as directed Daily. 60 mL 1    Insulin Pen Needle (Fanergiesoger Pen Needles) 32G X 4 MM misc Inject 1 each under the skin into the appropriate area as directed 4 (Four) Times a Day. For use with insulin injections 4 times daily 150 each 5    levothyroxine (SYNTHROID, LEVOTHROID) 25 MCG tablet Take 1 tablet by mouth Daily. 30 tablet 3    lisinopril (PRINIVIL,ZESTRIL) 2.5 MG tablet TAKE ONE TABLET BY MOUTH DAILY 30 tablet 3    Magnesium Oxide 400 (240 Mg) MG tablet Take 1 tablet by mouth Daily.      metFORMIN (GLUCOPHAGE) 1000 MG tablet TAKE ONE TABLET BY MOUTH TWICE A DAY WITH MEALS 180 tablet 1    mirtazapine (REMERON) 15 MG tablet Take 1 tablet by mouth Every Night. 30 tablet 1    Omega-3 Fatty Acids (FISH OIL PO) Take 1,000 mg by mouth 2 (two) times a day.      pravastatin (PRAVACHOL) 40 MG tablet Take 1 tablet by mouth Daily. 90 tablet 3    Tirzepatide (Mounjaro) 2.5 MG/0.5ML solution pen-injector Inject 0.5 mL under the skin into the appropriate area as directed 1 (One) Time Per Week. 2 mL 1    Vitamin D, Cholecalciferol,  (CHOLECALCIFEROL) 10 MCG (400 UNIT) tablet Take 1 tablet by mouth Daily.      Lactobacillus (PROBIOTIC ACIDOPHILUS PO) Take  by mouth.      ALPRAZolam (XANAX) 1 MG tablet Take  by mouth. (Patient not taking: Reported on 6/5/2023)      Lancets 33G misc 1 each Daily. For use with glucose monitoring, daily; E11.65 (Patient not taking: Reported on 6/5/2023) 100 each 3    NovoLOG FlexPen 100 UNIT/ML solution pen-injector sc pen  (Patient not taking: Reported on 6/5/2023)       No facility-administered medications prior to visit.       No opioid medication identified on active medication list. I have reviewed chart for other potential  high risk medication/s and harmful drug interactions in the elderly.        Aspirin is on active medication list. Aspirin use is indicated based on review of current medical condition/s. Pros and cons of this therapy have been discussed today. Benefits of this medication outweigh potential harm.  Patient has been encouraged to continue taking this medication.  .      Patient Active Problem List   Diagnosis    Anxiety    Fibromyalgia    Gastroparesis    Gastroesophageal reflux disease    Granuloma annulare    Gastrointestinal ulcer due to Helicobacter pylori    Hyperlipidemia LDL goal <70    Essential hypertension    Hypothyroidism    Vitamin D deficiency    Necrobiosis diabeticorum    Spleen enlargement    uterine Fibroids    Seborrheic keratoses    Fatty liver    IBS (irritable bowel syndrome)    Ovarian cyst    Yeast vaginitis    GERD (gastroesophageal reflux disease)    Depression    Menopause    Vaginal atrophy    Coronary artery disease involving native coronary artery of native heart with angina pectoris (HCC)    Type 2 diabetes mellitus, with long-term current use of insulin    Urinary frequency    Medicare annual wellness visit, subsequent    Obesity (BMI 30-39.9)    Dysuria    COVID-19 virus infection    Immunization due     Advance Care Planning  Advance Directive is not on file.   "ACP discussion was held with the patient during this visit. Patient has an advance directive (not in EMR), copy requested.          Objective    Vitals:    23 0905   BP: 150/70   Pulse: 75   Resp: 20   Temp: 97.3 °F (36.3 °C)   SpO2: 95%   Weight: 77.6 kg (171 lb)   Height: 157.5 cm (62\")   PainSc:   5   PainLoc: Hip     Estimated body mass index is 31.28 kg/m² as calculated from the following:    Height as of this encounter: 157.5 cm (62\").    Weight as of this encounter: 77.6 kg (171 lb).    BMI is >= 30 and <35. (Class 1 Obesity). The following options were offered after discussion;: weight loss educational material (shared in after visit summary), exercise counseling/recommendations, and nutrition counseling/recommendations      Does the patient have evidence of cognitive impairment? No    Physical Exam  Lab Results   Component Value Date    HGBA1C 10.4 2023            HEALTH RISK ASSESSMENT    Smoking Status:  Social History     Tobacco Use   Smoking Status Former    Packs/day: 1.00    Years: 27.00    Pack years: 27.00    Types: Cigarettes    Start date: 1972    Quit date: 1994    Years since quittin.4   Smokeless Tobacco Never   Tobacco Comments     QUIT      Alcohol Consumption:  Social History     Substance and Sexual Activity   Alcohol Use No     Fall Risk Screen:    CATHY Fall Risk Assessment was completed, and patient is at LOW risk for falls.Assessment completed on:2023    Depression Screenin/5/2023     9:15 AM   PHQ-2/PHQ-9 Depression Screening   Little Interest or Pleasure in Doing Things 0-->not at all   Feeling Down, Depressed or Hopeless 0-->not at all   PHQ-9: Brief Depression Severity Measure Score 0       Health Habits and Functional and Cognitive Screenin/5/2023     9:15 AM   Functional & Cognitive Status   Do you have difficulty preparing food and eating? No   Do you have difficulty bathing yourself, getting dressed or grooming yourself? No "   Do you have difficulty using the toilet? No   Do you have difficulty moving around from place to place? No   Do you have trouble with steps or getting out of a bed or a chair? No   Current Diet Unhealthy Diet   Dental Exam Up to date   Eye Exam Up to date   Exercise (times per week) 0 times per week   Current Exercises Include No Regular Exercise   Do you need help using the phone?  No   Are you deaf or do you have serious difficulty hearing?  No   Do you need help with transportation? No   Do you need help shopping? No   Do you need help preparing meals?  No   Do you need help with housework?  Yes   Do you need help with laundry? No   Do you need help taking your medications? No   Do you need help managing money? No   Do you ever drive or ride in a car without wearing a seat belt? Yes       Age-appropriate Screening Schedule:  Refer to the list below for future screening recommendations based on patient's age, sex and/or medical conditions. Orders for these recommended tests are listed in the plan section. The patient has been provided with a written plan.    Health Maintenance   Topic Date Due    ZOSTER VACCINE (1 of 2) Never done    Pneumococcal Vaccine 0-64 (2 - PCV) 11/08/2020    COVID-19 Vaccine (3 - Pfizer series) 06/08/2021    DIABETIC EYE EXAM  03/17/2022    ANNUAL WELLNESS VISIT  05/23/2023    DIABETIC FOOT EXAM  05/23/2023    INFLUENZA VACCINE  08/01/2023    LIPID PANEL  08/03/2023    HEMOGLOBIN A1C  11/19/2023    URINE MICROALBUMIN  04/24/2024    MAMMOGRAM  09/15/2024    PAP SMEAR  07/28/2025    COLORECTAL CANCER SCREENING  09/20/2025    TDAP/TD VACCINES (2 - Td or Tdap) 08/17/2026    HEPATITIS C SCREENING  Completed              Assessment & Plan   CMS Preventative Services Quick Reference  Risk Factors Identified During Encounter  Immunizations Discussed/Encouraged: Prevnar 20 (Pneumococcal 20-valent conjugate)  Polypharmacy: Medication List reviewed and Medications are appropriate for patient  The  above risks/problems have been discussed with the patient.  Follow up actions/plans if indicated are seen below in the Assessment/Plan Section.  Pertinent information has been shared with the patient in the After Visit Summary.    Diagnoses and all orders for this visit:    1. Medicare annual wellness visit, subsequent (Primary)  -     CBC & Differential; Future  -     Comprehensive Metabolic Panel; Future  -     Lipid Panel; Future    2. Immunization due  -     Pneumococcal Conjugate Vaccine 20-Valent All    3. Type 2 diabetes mellitus without complication, with long-term current use of insulin    4. Hypothyroidism, unspecified type  -     TSH; Future    5. Vitamin D deficiency  -     Vitamin D,25-Hydroxy; Future    6. Depression, unspecified depression type    7. Left hip pain  -     Ambulatory Referral to Orthopedic Surgery        Follow Up:   No follow-ups on file.     An After Visit Summary and PPPS were made available to the patient.

## 2023-06-05 NOTE — TELEPHONE ENCOUNTER
Rx Refill Note    Requested Prescriptions     Pending Prescriptions Disp Refills    Continuous Blood Gluc Sensor (FreeStyle Gary 2 Sensor) misc [Pharmacy Med Name: FREESTYLE GARY 2 SENSOR]       Sig: CHANGE SENSOR EVERY 14 DAYS        Last office visit with prescribing clinician: 5/19/2023       Next office visit with prescribing clinician: 10/11/2023     {    Rosa Morgan MA  06/05/23, 10:56 EDT

## 2023-06-05 NOTE — PATIENT INSTRUCTIONS
Medicare Wellness  Personal Prevention Plan of Service     Date of Office Visit:    Encounter Provider:  Emi Martinez MD  Place of Service:  [facility]  Patient Name: Christi Chapa  :  1960    As part of the Medicare Wellness portion of your visit today, we are providing you with this personalized preventive plan of services (PPPS). This plan is based upon recommendations of the [place] Preventive Services Task Force (USPSTF) and the Advisory Committee on Immunization Practices (ACIP).    This lists the preventive care services that should be considered, and provides dates of when you are due. Items listed as completed are up-to-date and do not require any further intervention.    Health Maintenance   Topic Date Due    ZOSTER VACCINE (1 of 2) Never done    Pneumococcal Vaccine 0-64 (2 - PCV) 2020    COVID-19 Vaccine (3 - Pfizer series) 2021    DIABETIC EYE EXAM  2022    ANNUAL WELLNESS VISIT  2023    DIABETIC FOOT EXAM  2023    INFLUENZA VACCINE  2023    LIPID PANEL  2023    HEMOGLOBIN A1C  2023    URINE MICROALBUMIN  2024    MAMMOGRAM  09/15/2024    PAP SMEAR  2025    COLORECTAL CANCER SCREENING  2025    TDAP/TD VACCINES (2 - Td or Tdap) 2026    HEPATITIS C SCREENING  Completed       Orders Placed This Encounter   Procedures    Pneumococcal Conjugate Vaccine 20-Valent All    Comprehensive Metabolic Panel     Standing Status:   Future     Order Specific Question:   Release to patient     Answer:   Routine Release    Lipid Panel     Standing Status:   Future    TSH     Standing Status:   Future     Order Specific Question:   Release to patient     Answer:   Routine Release    Vitamin D,25-Hydroxy     Standing Status:   Future     Order Specific Question:   Release to patient     Answer:   Routine Release    Ambulatory Referral to Orthopedic Surgery     Referral Priority:   Routine      Referral Type:   Consultation     Referral Reason:   Specialty Services Required     Requested Specialty:   Orthopedic Surgery     Number of Visits Requested:   1    CBC & Differential     Standing Status:   Future     Order Specific Question:   Manual Differential     Answer:   No       No follow-ups on file.

## 2023-06-10 ENCOUNTER — APPOINTMENT (OUTPATIENT)
Dept: CT IMAGING | Facility: HOSPITAL | Age: 63
End: 2023-06-10
Payer: MEDICARE

## 2023-06-10 ENCOUNTER — HOSPITAL ENCOUNTER (EMERGENCY)
Facility: HOSPITAL | Age: 63
Discharge: HOME | End: 2023-06-10
Attending: EMERGENCY MEDICINE
Payer: MEDICARE

## 2023-06-10 VITALS
BODY MASS INDEX: 30.18 KG/M2 | TEMPERATURE: 97.6 F | HEIGHT: 62 IN | DIASTOLIC BLOOD PRESSURE: 61 MMHG | OXYGEN SATURATION: 96 % | WEIGHT: 164 LBS | RESPIRATION RATE: 18 BRPM | SYSTOLIC BLOOD PRESSURE: 120 MMHG | HEART RATE: 91 BPM

## 2023-06-10 DIAGNOSIS — R10.10 UPPER ABDOMINAL PAIN: ICD-10-CM

## 2023-06-10 DIAGNOSIS — K31.84 GASTROPARESIS: ICD-10-CM

## 2023-06-10 DIAGNOSIS — R11.10 VOMITING AND DIARRHEA: Primary | ICD-10-CM

## 2023-06-10 DIAGNOSIS — R19.7 VOMITING AND DIARRHEA: Primary | ICD-10-CM

## 2023-06-10 LAB
ALBUMIN SERPL-MCNC: 4.6 G/DL (ref 3.5–5.2)
ALBUMIN/GLOB SERPL: 1.3 G/DL
ALP SERPL-CCNC: 53 U/L (ref 39–117)
ALT SERPL W P-5'-P-CCNC: 43 U/L (ref 1–33)
ANION GAP SERPL CALCULATED.3IONS-SCNC: 20 MMOL/L (ref 5–15)
AST SERPL-CCNC: 31 U/L (ref 1–32)
BACTERIA UR QL AUTO: ABNORMAL /HPF
BASOPHILS # BLD AUTO: 0.04 10*3/MM3 (ref 0–0.2)
BASOPHILS NFR BLD AUTO: 0.4 % (ref 0–1.5)
BILIRUB SERPL-MCNC: 0.5 MG/DL (ref 0–1.2)
BILIRUB UR QL STRIP: ABNORMAL
BUN SERPL-MCNC: 25 MG/DL (ref 8–23)
BUN/CREAT SERPL: 30.9 (ref 7–25)
CALCIUM SPEC-SCNC: 10.2 MG/DL (ref 8.6–10.5)
CHLORIDE SERPL-SCNC: 101 MMOL/L (ref 98–107)
CLARITY UR: CLEAR
CO2 SERPL-SCNC: 16 MMOL/L (ref 22–29)
COLOR UR: YELLOW
CREAT SERPL-MCNC: 0.81 MG/DL (ref 0.57–1)
D-LACTATE SERPL-SCNC: 1.7 MMOL/L (ref 0.5–2)
DEPRECATED RDW RBC AUTO: 46.5 FL (ref 37–54)
EGFRCR SERPLBLD CKD-EPI 2021: 81.7 ML/MIN/1.73
EOSINOPHIL # BLD AUTO: 0.01 10*3/MM3 (ref 0–0.4)
EOSINOPHIL NFR BLD AUTO: 0.1 % (ref 0.3–6.2)
ERYTHROCYTE [DISTWIDTH] IN BLOOD BY AUTOMATED COUNT: 15.8 % (ref 12.3–15.4)
GLOBULIN UR ELPH-MCNC: 3.5 GM/DL
GLUCOSE SERPL-MCNC: 279 MG/DL (ref 65–99)
GLUCOSE UR STRIP-MCNC: ABNORMAL MG/DL
HCT VFR BLD AUTO: 48 % (ref 34–46.6)
HGB BLD-MCNC: 14.3 G/DL (ref 12–15.9)
HGB UR QL STRIP.AUTO: ABNORMAL
HOLD SPECIMEN: NORMAL
HYALINE CASTS UR QL AUTO: ABNORMAL /LPF
IMM GRANULOCYTES # BLD AUTO: 0.03 10*3/MM3 (ref 0–0.05)
IMM GRANULOCYTES NFR BLD AUTO: 0.3 % (ref 0–0.5)
KETONES UR QL STRIP: ABNORMAL
LEUKOCYTE ESTERASE UR QL STRIP.AUTO: NEGATIVE
LIPASE SERPL-CCNC: 24 U/L (ref 13–60)
LYMPHOCYTES # BLD AUTO: 1.47 10*3/MM3 (ref 0.7–3.1)
LYMPHOCYTES NFR BLD AUTO: 15.1 % (ref 19.6–45.3)
MAGNESIUM SERPL-MCNC: 1.7 MG/DL (ref 1.6–2.4)
MCH RBC QN AUTO: 24.7 PG (ref 26.6–33)
MCHC RBC AUTO-ENTMCNC: 29.8 G/DL (ref 31.5–35.7)
MCV RBC AUTO: 83 FL (ref 79–97)
MONOCYTES # BLD AUTO: 0.53 10*3/MM3 (ref 0.1–0.9)
MONOCYTES NFR BLD AUTO: 5.4 % (ref 5–12)
NEUTROPHILS NFR BLD AUTO: 7.67 10*3/MM3 (ref 1.7–7)
NEUTROPHILS NFR BLD AUTO: 78.7 % (ref 42.7–76)
NITRITE UR QL STRIP: NEGATIVE
NRBC BLD AUTO-RTO: 0.2 /100 WBC (ref 0–0.2)
PH UR STRIP.AUTO: 5.5 [PH] (ref 5–8)
PLATELET # BLD AUTO: 323 10*3/MM3 (ref 140–450)
PMV BLD AUTO: 10.1 FL (ref 6–12)
POTASSIUM SERPL-SCNC: 4.5 MMOL/L (ref 3.5–5.2)
PROCALCITONIN SERPL-MCNC: 0.12 NG/ML (ref 0–0.25)
PROT SERPL-MCNC: 8.1 G/DL (ref 6–8.5)
PROT UR QL STRIP: ABNORMAL
RBC # BLD AUTO: 5.78 10*6/MM3 (ref 3.77–5.28)
RBC # UR STRIP: ABNORMAL /HPF
REF LAB TEST METHOD: ABNORMAL
SODIUM SERPL-SCNC: 137 MMOL/L (ref 136–145)
SP GR UR STRIP: 1.01 (ref 1–1.03)
SQUAMOUS #/AREA URNS HPF: ABNORMAL /HPF
UROBILINOGEN UR QL STRIP: ABNORMAL
WBC # UR STRIP: ABNORMAL /HPF
WBC NRBC COR # BLD: 9.75 10*3/MM3 (ref 3.4–10.8)
WHOLE BLOOD HOLD COAG: NORMAL
WHOLE BLOOD HOLD SPECIMEN: NORMAL

## 2023-06-10 PROCEDURE — 25010000002 ONDANSETRON PER 1 MG: Performed by: EMERGENCY MEDICINE

## 2023-06-10 PROCEDURE — 83690 ASSAY OF LIPASE: CPT

## 2023-06-10 PROCEDURE — 84145 PROCALCITONIN (PCT): CPT | Performed by: EMERGENCY MEDICINE

## 2023-06-10 PROCEDURE — 99283 EMERGENCY DEPT VISIT LOW MDM: CPT

## 2023-06-10 PROCEDURE — 81001 URINALYSIS AUTO W/SCOPE: CPT

## 2023-06-10 PROCEDURE — 83605 ASSAY OF LACTIC ACID: CPT

## 2023-06-10 PROCEDURE — 85025 COMPLETE CBC W/AUTO DIFF WBC: CPT

## 2023-06-10 PROCEDURE — 96375 TX/PRO/DX INJ NEW DRUG ADDON: CPT

## 2023-06-10 PROCEDURE — 80053 COMPREHEN METABOLIC PANEL: CPT

## 2023-06-10 PROCEDURE — 96374 THER/PROPH/DIAG INJ IV PUSH: CPT

## 2023-06-10 PROCEDURE — 25010000002 DIPHENHYDRAMINE PER 50 MG: Performed by: EMERGENCY MEDICINE

## 2023-06-10 PROCEDURE — 25010000002 METOCLOPRAMIDE PER 10 MG: Performed by: EMERGENCY MEDICINE

## 2023-06-10 PROCEDURE — 96376 TX/PRO/DX INJ SAME DRUG ADON: CPT

## 2023-06-10 PROCEDURE — 74176 CT ABD & PELVIS W/O CONTRAST: CPT

## 2023-06-10 PROCEDURE — 83735 ASSAY OF MAGNESIUM: CPT | Performed by: EMERGENCY MEDICINE

## 2023-06-10 PROCEDURE — 25010000002 PROMETHAZINE PER 50 MG: Performed by: EMERGENCY MEDICINE

## 2023-06-10 PROCEDURE — 36415 COLL VENOUS BLD VENIPUNCTURE: CPT

## 2023-06-10 RX ORDER — PROMETHAZINE HYDROCHLORIDE 25 MG/1
25 SUPPOSITORY RECTAL EVERY 6 HOURS PRN
Qty: 12 SUPPOSITORY | Refills: 0 | Status: SHIPPED | OUTPATIENT
Start: 2023-06-10

## 2023-06-10 RX ORDER — DIPHENHYDRAMINE HYDROCHLORIDE 50 MG/ML
25 INJECTION INTRAMUSCULAR; INTRAVENOUS ONCE
Status: COMPLETED | OUTPATIENT
Start: 2023-06-10 | End: 2023-06-10

## 2023-06-10 RX ORDER — ONDANSETRON 2 MG/ML
4 INJECTION INTRAMUSCULAR; INTRAVENOUS ONCE
Status: COMPLETED | OUTPATIENT
Start: 2023-06-10 | End: 2023-06-10

## 2023-06-10 RX ORDER — SODIUM CHLORIDE 9 MG/ML
10 INJECTION INTRAVENOUS AS NEEDED
Status: DISCONTINUED | OUTPATIENT
Start: 2023-06-10 | End: 2023-06-10 | Stop reason: HOSPADM

## 2023-06-10 RX ORDER — METOCLOPRAMIDE HYDROCHLORIDE 5 MG/ML
5 INJECTION INTRAMUSCULAR; INTRAVENOUS ONCE
Status: COMPLETED | OUTPATIENT
Start: 2023-06-10 | End: 2023-06-10

## 2023-06-10 RX ADMIN — SODIUM CHLORIDE 1000 ML: 9 INJECTION, SOLUTION INTRAVENOUS at 12:58

## 2023-06-10 RX ADMIN — ONDANSETRON 4 MG: 2 INJECTION INTRAMUSCULAR; INTRAVENOUS at 11:30

## 2023-06-10 RX ADMIN — ONDANSETRON 4 MG: 2 INJECTION INTRAMUSCULAR; INTRAVENOUS at 12:18

## 2023-06-10 RX ADMIN — DIPHENHYDRAMINE HYDROCHLORIDE 25 MG: 50 INJECTION INTRAMUSCULAR; INTRAVENOUS at 15:09

## 2023-06-10 RX ADMIN — METOCLOPRAMIDE 5 MG: 5 INJECTION, SOLUTION INTRAMUSCULAR; INTRAVENOUS at 15:09

## 2023-06-10 RX ADMIN — PROMETHAZINE HYDROCHLORIDE 12.5 MG: 25 INJECTION INTRAMUSCULAR; INTRAVENOUS at 13:29

## 2023-06-10 RX ADMIN — SODIUM CHLORIDE 1000 ML: 9 INJECTION, SOLUTION INTRAVENOUS at 11:30

## 2023-06-10 NOTE — ED PROVIDER NOTES
EMERGENCY DEPARTMENT ENCOUNTER    Pt Name: Christi Chapa  MRN: [id number]  Pt :   1960  Room Number:    Date of encounter:  6/10/2023  PCP: Emi Martinez MD  ED Provider: Tayo Catherine MD    Historian: Patient and spouse      HPI:  Chief Complaint: Vomiting, diarrhea, abdominal pain        Context: Christi Chapa is a 62 y.o. female who presents to the ED c/o symptoms which started roughly 24 hours after her weekly injection of Meijerno for diabetes.  The patient was previously on Rybelsus roughly 1 month ago.  She had 2 shots of this and then noted that this made her sick.  It was discontinued.  She now is on this new medication and after the first week felt that she had a headache because of it.  This is her second injection just 2 days ago and now she notes vomiting and diarrhea.  She notes multiple episodes of diarrhea and 3 episodes of vomiting since last night.  She tried Zofran ODT but this caused her to vomit because of the flavor.  She is seen no blood in her emesis or stool.  She reports moderate upper abdominal discomfort but feels it is likely secondary to her retching.  The patient has a history of gastroparesis, diastases recti, ureteral stones.  She is status post remote cholecystectomy and appendectomy.        PAST MEDICAL HISTORY  Past Medical History:   Diagnosis Date   • Anxiety    • Arthritis    • Cataract    • Coronary artery disease    • Depression    • Diabetes mellitus    • Fibromyalgia, primary    • GERD (gastroesophageal reflux disease)    • Hyperlipidemia    • Hypertension    • Hypothyroidism    • IBS (irritable bowel syndrome)    • Kidney stone    • New onset of headaches    • Obesity    • Tonsillitis 2022   • Type 2 diabetes mellitus          PAST SURGICAL HISTORY  Past Surgical History:   Procedure Laterality Date   • APPENDECTOMY     • BREAST EXCISIONAL BIOPSY Left     [place], [place]   • CHOLECYSTECTOMY     • COLONOSCOPY     • COSMETIC SURGERY     • EYE  SURGERY      eye lid surgery    • KIDNEY STONE SURGERY     • RHINOPLASTY     • ROOT CANAL           FAMILY HISTORY  Family History   Problem Relation Age of Onset   • Diabetes Mother    • COPD Mother    • Hypertension Mother    • Thyroid disease Mother    • Heart disease Mother    • Depression Mother    • Cancer Father         Melinoma   • Melanoma Father    • Diabetes Brother    • Depression Brother    • Anxiety disorder Brother    • Bleeding Disorder Daughter    • Fibromyalgia Daughter    • Obesity Other    • Thyroid disease Other    • Pulmonary embolism Other    • Melanoma Other    • Arthritis Other    • Hyperlipidemia Other    • Cancer Brother    • Melanoma Brother    • Anxiety disorder Brother    • Depression Brother    • Heart attack Brother    • Diabetes Brother    • Anxiety disorder Brother    • Depression Brother    • Heart failure Brother    • Thyroid disease Brother    • Heart disease Brother    • Hypertension Brother    • BRCA 1/2 Neg Hx    • Breast cancer Neg Hx    • Colon cancer Neg Hx    • Endometrial cancer Neg Hx    • Ovarian cancer Neg Hx          SOCIAL HISTORY  Social History     Socioeconomic History   • Marital status:    Tobacco Use   • Smoking status: Former     Packs/day: 1.00     Years: 27.00     Pack years: 27.00     Types: Cigarettes     Start date: 1972     Quit date: 1994     Years since quittin.4   • Smokeless tobacco: Never   • Tobacco comments:      QUIT    Vaping Use   • Vaping Use: Never used   Substance and Sexual Activity   • Alcohol use: No   • Drug use: No   • Sexual activity: Not Currently     Partners: Male     Birth control/protection: Post-menopausal         ALLERGIES  Atorvastatin, Crestor [rosuvastatin calcium], Simvastatin, Trulicity  [dulaglutide], and Sulfa antibiotics        REVIEW OF SYSTEMS  Review of Systems       All systems reviewed and negative except for those discussed in HPI.       PHYSICAL EXAM    I have reviewed the triage vital  signs and nursing notes.    ED Triage Vitals [06/10/23 1055]   Temp Heart Rate Resp BP SpO2   97.6 °F (36.4 °C) 105 18 (!) 182/77 92 %      Temp src Heart Rate Source Patient Position BP Location FiO2 (%)   Oral Monitor Sitting Right arm --       Physical Exam  GENERAL:   Appears somewhat weak and nauseated  HENT: Nares patent.  Dry mucous membranes  EYES: No scleral icterus.  CV: Regular rhythm, borderline tachycardic rate.  No murmurs gallops rubs  RESPIRATORY: Normal effort.  No audible wheezes, rales or rhonchi.  Clear to auscultation  ABDOMEN: Soft, moderately tender to palpation in the epigastric region only.  No guarding rebound or rigidity.  No hepatosplenomegaly or masses.  MUSCULOSKELETAL: No deformities.   NEURO: Alert, moves all extremities, follows commands.  SKIN: Warm, dry, no rash visualized.      LAB RESULTS  Recent Results (from the past 24 hour(s))   Comprehensive Metabolic Panel    Collection Time: 06/10/23 11:12 AM    Specimen: Arm, Left; Blood   Result Value Ref Range    Glucose 279 (H) 65 - 99 mg/dL    BUN 25 (H) 8 - 23 mg/dL    Creatinine 0.81 0.57 - 1.00 mg/dL    Sodium 137 136 - 145 mmol/L    Potassium 4.5 3.5 - 5.2 mmol/L    Chloride 101 98 - 107 mmol/L    CO2 16.0 (L) 22.0 - 29.0 mmol/L    Calcium 10.2 8.6 - 10.5 mg/dL    Total Protein 8.1 6.0 - 8.5 g/dL    Albumin 4.6 3.5 - 5.2 g/dL    ALT (SGPT) 43 (H) 1 - 33 U/L    AST (SGOT) 31 1 - 32 U/L    Alkaline Phosphatase 53 39 - 117 U/L    Total Bilirubin 0.5 0.0 - 1.2 mg/dL    Globulin 3.5 gm/dL    A/G Ratio 1.3 g/dL    BUN/Creatinine Ratio 30.9 (H) 7.0 - 25.0    Anion Gap 20.0 (H) 5.0 - 15.0 mmol/L    eGFR 81.7 >60.0 mL/min/1.73   Lipase    Collection Time: 06/10/23 11:12 AM    Specimen: Arm, Left; Blood   Result Value Ref Range    Lipase 24 13 - 60 U/L   Lactic Acid, Plasma    Collection Time: 06/10/23 11:12 AM    Specimen: Arm, Left; Blood   Result Value Ref Range    Lactate 1.7 0.5 - 2.0 mmol/L   Green Top (Gel)    Collection Time:  06/10/23 11:12 AM   Result Value Ref Range    Extra Tube Hold for add-ons.    Lavender Top    Collection Time: 06/10/23 11:12 AM   Result Value Ref Range    Extra Tube hold for add-on    Gold Top - SST    Collection Time: 06/10/23 11:12 AM   Result Value Ref Range    Extra Tube Hold for add-ons.    Light Blue Top    Collection Time: 06/10/23 11:12 AM   Result Value Ref Range    Extra Tube Hold for add-ons.    CBC Auto Differential    Collection Time: 06/10/23 11:12 AM    Specimen: Arm, Left; Blood   Result Value Ref Range    WBC 9.75 3.40 - 10.80 10*3/mm3    RBC 5.78 (H) 3.77 - 5.28 10*6/mm3    Hemoglobin 14.3 12.0 - 15.9 g/dL    Hematocrit 48.0 (H) 34.0 - 46.6 %    MCV 83.0 79.0 - 97.0 fL    MCH 24.7 (L) 26.6 - 33.0 pg    MCHC 29.8 (L) 31.5 - 35.7 g/dL    RDW 15.8 (H) 12.3 - 15.4 %    RDW-SD 46.5 37.0 - 54.0 fl    MPV 10.1 6.0 - 12.0 fL    Platelets 323 140 - 450 10*3/mm3    Neutrophil % 78.7 (H) 42.7 - 76.0 %    Lymphocyte % 15.1 (L) 19.6 - 45.3 %    Monocyte % 5.4 5.0 - 12.0 %    Eosinophil % 0.1 (L) 0.3 - 6.2 %    Basophil % 0.4 0.0 - 1.5 %    Immature Grans % 0.3 0.0 - 0.5 %    Neutrophils, Absolute 7.67 (H) 1.70 - 7.00 10*3/mm3    Lymphocytes, Absolute 1.47 0.70 - 3.10 10*3/mm3    Monocytes, Absolute 0.53 0.10 - 0.90 10*3/mm3    Eosinophils, Absolute 0.01 0.00 - 0.40 10*3/mm3    Basophils, Absolute 0.04 0.00 - 0.20 10*3/mm3    Immature Grans, Absolute 0.03 0.00 - 0.05 10*3/mm3    nRBC 0.2 0.0 - 0.2 /100 WBC   Magnesium    Collection Time: 06/10/23 11:12 AM    Specimen: Arm, Left; Blood   Result Value Ref Range    Magnesium 1.7 1.6 - 2.4 mg/dL   Procalcitonin    Collection Time: 06/10/23 11:12 AM    Specimen: Arm, Left; Blood   Result Value Ref Range    Procalcitonin 0.12 0.00 - 0.25 ng/mL   Urinalysis With Microscopic If Indicated (No Culture) - Urine, Clean Catch    Collection Time: 06/10/23 12:56 PM    Specimen: Urine, Clean Catch   Result Value Ref Range    Color, UA Yellow Yellow, Straw    Appearance, UA  Clear Clear    pH, UA 5.5 5.0 - 8.0    Specific Gravity, UA 1.010 1.005 - 1.030    Glucose, UA >=1000 mg/dL (3+) (A) Negative    Ketones, UA Trace (A) Negative    Bilirubin, UA Small (1+) (A) Negative    Blood, UA Trace (A) Negative    Protein, UA Trace (A) Negative    Leuk Esterase, UA Negative Negative    Nitrite, UA Negative Negative    Urobilinogen, UA 0.2 E.U./dL 0.2 - 1.0 E.U./dL   Urinalysis, Microscopic Only - Urine, Clean Catch    Collection Time: 06/10/23 12:56 PM    Specimen: Urine, Clean Catch   Result Value Ref Range    RBC, UA 0-2 None Seen, 0-2 /HPF    WBC, UA 3-5 (A) None Seen, 0-2 /HPF    Bacteria, UA None Seen None Seen, Trace /HPF    Squamous Epithelial Cells, UA 3-6 (A) None Seen, 0-2 /HPF    Hyaline Casts, UA 0-6 0 - 6 /LPF    Methodology Automated Microscopy        If labs were ordered, I independently reviewed the results and considered them in treating the patient.        RADIOLOGY  CT Abdomen Pelvis Without Contrast    Result Date: 6/10/2023  CT ABDOMEN PELVIS WO CONTRAST Date of Exam: 6/10/2023 12:31 PM EDT Indication: diffuse upper abd pain, vomiting and diarrhea Comparison: CT abdomen and pelvis without contrast 3/21/2022, 6/24/2014 Technique: Axial CT images were obtained of the abdomen and pelvis without the administration of contrast. Reconstructed coronal and sagittal images were also obtained. Automated exposure control and iterative construction methods were used. Findings: Included lung bases are clear. The liver is mildly lower in attenuation than the spleen, consistent with mild hepatic steatosis. Gallbladder is surgically absent. There are punctate nonobstructing bilateral renal calcifications, measuring up to 4 mm. No ureterolithiasis or hydronephrosis. Stable 1.5 cm hypodense right renal lesion is incompletely characterized without contrast, but statistically represents a cyst. Adrenal glands, spleen, pancreas appear unremarkable for noncontrast CT. The stomach is moderately  distended with food debris. No abnormal small bowel distention is seen. Appendix is not visualized, but no right lower quadrant inflammation is noted. No urinary bladder calcifications are seen. No significant free fluid or lymphadenopathy is seen. There are atherosclerotic vascular calcifications. No acute osseous abnormality is identified.     Impression: 1.Stomach is moderately distended by food debris. No abnormal small bowel distention. 2.Nonobstructing bilateral nephrolithiasis. 3.Mild diffuse hepatic steatosis. Electronically Signed: Yelitza Mcintyre  6/10/2023 12:48 PM EDT  Workstation ID: [id number]      I ordered and independently reviewed the above noted radiographic studies.      I viewed images of CT abdomen pelvis which showed distended stomach but no signs of obstruction per my independent interpretation.    See radiologist's dictation for official interpretation.        PROCEDURES    Procedures    No orders to display       MEDICATIONS GIVEN IN ER    Medications   Sodium Chloride (PF) 0.9 % 10 mL (has no administration in time range)   promethazine (PHENERGAN) 12.5 mg in sodium chloride 0.9 % 50 mL (0 mg Intravenous Stopped 6/10/23 1350)   metoclopramide (REGLAN) injection 5 mg (has no administration in time range)   diphenhydrAMINE (BENADRYL) injection 25 mg (has no administration in time range)   sodium chloride 0.9 % bolus 1,000 mL (0 mL Intravenous Stopped 6/10/23 1259)   ondansetron (ZOFRAN) injection 4 mg (4 mg Intravenous Given 6/10/23 1130)   ondansetron (ZOFRAN) injection 4 mg (4 mg Intravenous Given 6/10/23 1218)   sodium chloride 0.9 % bolus 1,000 mL (1,000 mL Intravenous New Bag 6/10/23 1258)         MEDICAL DECISION MAKING, PROGRESS, and CONSULTS    All labs have been independently reviewed by me.  All radiology studies have been reviewed by me and the radiologist dictating the report.  All EKG's have been independently viewed and interpreted by me.      Discussion below represents my  analysis of pertinent findings related to patient's condition, differential diagnosis, treatment plan and final disposition.      Differential diagnosis:    Medication reaction versus worsening gastroparesis versus gastroenteritis, etc.      Additional sources:    - Discussed/ obtained information from independent historians: Patient's  provided some information.    - External (non-ED) record review: I reviewed today and outpatient wellness visit check with her PCP Dr. Martinez which was performed on 6/5/2023    - Chronic or social conditions impacting care: Obesity with BMI 30    - Shared decision making: Patient in full agreement with current plan for evaluation, treatment, discharge to home.      Orders placed during this visit:  Orders Placed This Encounter   Procedures   • CT Abdomen Pelvis Without Contrast   • Antler Draw   • Comprehensive Metabolic Panel   • Lipase   • Urinalysis With Microscopic If Indicated (No Culture) - Urine, Clean Catch   • Lactic Acid, Plasma   • CBC Auto Differential   • Magnesium   • Procalcitonin   • Urinalysis, Microscopic Only - Urine, Clean Catch   • NPO Diet NPO Type: Strict NPO   • Undress & Gown   • Insert Peripheral IV   • CBC & Differential   • Green Top (Gel)   • Lavender Top   • Gold Top - SST   • Gray Top   • Light Blue Top         Additional orders considered but not ordered:  IV contrasted CT scan of the abdomen and pelvis    ED Course:    Consultants:      ED Course as of 06/10/23 1457   Sat Pascual 10, 2023   1303 Nausea is refractory to Zofran ODT at home as well as Zofran IV x 2 doses. [MS]   1307 Receiving 2nd liter of NS IV bolus. [MS]   1418 Feeling much improved.  I spoke with her about the amount of food still in her stomach.  She would like to try a single dose of the Reglan but not beyond it on an outpatient basis.  She will follow-up with her ordering physician and likely get off of her new medication which is likely to have caused these side effects. [MS]       ED Course User Index  [MS] Tayo Catherine MD                  AS OF 14:57 EDT VITALS:    BP - 120/61  HR - 91  TEMP - 97.6 °F (36.4 °C) (Oral)  O2 SATS - 96%                  DIAGNOSIS  Final diagnoses:   Vomiting and diarrhea   Upper abdominal pain   Gastroparesis         DISPOSITION  DISCHARGE    Patient discharged in stable condition.    Reviewed implications of results, diagnosis, meds, responsibility to follow up, warning signs and symptoms of possible worsening, potential complications and reasons to return to ER.    Patient/Family voiced understanding of above instructions.    Discussed plan for discharge, as there is no emergent indication for admission.  Pt/family is agreeable and understands need for follow up and possible repeat testing.  Pt/family is aware that discharge does not mean that nothing is wrong but that it indicates no emergency is currently present that requires admission and they must continue care with follow-up as given below or with a physician of their choice.     FOLLOW-UP  Emi Martinez MD  [address] [place] [address]  [phone]      NEXT AVAILABLE APPOINTMENT - RECHECK OF CONDITION    [facility]  [address] [place] [id number] [phone]    IF YOU HAVE ANY CONCERN OF WORSENING CONDITION         Medication List      New Prescriptions    promethazine 25 MG suppository  Commonly known as: PHENERGAN  Insert 1 suppository into the rectum Every 6 (Six) Hours As Needed for Nausea or Vomiting.           Where to Get Your Medications      These medications were sent to [facility] [id number] - [address] AT [facility] [phone] [facility] [phone] [address], [place] [address]    Phone: [phone]   · promethazine 25 MG suppository             Please note that portions of this document were completed with voice recognition software.        Tayo Catherine,  MD  06/10/23 2041

## 2023-06-10 NOTE — DISCHARGE INSTRUCTIONS
Push fluids.  Limit food that is solid is much as possible for right now.    Discuss the possibility of discontinuation of your weekly diabetes medication.  I will leave that to your and your doctor's discretion.    Utilize Phenergan suppositories as written.    If you have any concern or worsening condition please return to the emergency department.

## 2023-06-12 RX ORDER — LEVOTHYROXINE SODIUM 0.03 MG/1
25 TABLET ORAL DAILY
Qty: 30 TABLET | Refills: 3 | Status: SHIPPED | OUTPATIENT
Start: 2023-06-12

## 2023-06-12 NOTE — TELEPHONE ENCOUNTER
"    Caller: Christi Chapa \"Fariba\"    Relationship: Self    Best call back number: [phone]    Requested Prescriptions:   Requested Prescriptions     Pending Prescriptions Disp Refills   • levothyroxine (SYNTHROID, LEVOTHROID) 25 MCG tablet 30 tablet 3     Sig: Take 1 tablet by mouth Daily.        Pharmacy where request should be sent: [facility] [id number] [address] AT [facility] [phone] [facility] [phone] FX     Last office visit with prescribing clinician: 6/5/2023   Last telemedicine visit with prescribing clinician: Visit date not found   Next office visit with prescribing clinician: 12/6/2023     Additional details provided by patient: PATIENT OUT OF MEDICATION . PATIENT REQUESTING SYNTHROID MEDICATION REFILL    Does the patient have less than a 3 day supply:  [x] Yes  [] No    Would you like a call back once the refill request has been completed: [] Yes [x] No    If the office needs to give you a call back, can they leave a voicemail: [] Yes [x] No    Arnaldo Tyler Rep   06/12/23 12:57 EDT           "

## 2023-06-13 ENCOUNTER — TELEPHONE (OUTPATIENT)
Dept: FAMILY MEDICINE CLINIC | Facility: CLINIC | Age: 63
End: 2023-06-13

## 2023-06-13 ENCOUNTER — OFFICE VISIT (OUTPATIENT)
Dept: FAMILY MEDICINE CLINIC | Facility: CLINIC | Age: 63
End: 2023-06-13
Payer: MEDICARE

## 2023-06-13 VITALS
BODY MASS INDEX: 30.62 KG/M2 | DIASTOLIC BLOOD PRESSURE: 78 MMHG | TEMPERATURE: 98.6 F | WEIGHT: 166.4 LBS | HEART RATE: 84 BPM | SYSTOLIC BLOOD PRESSURE: 138 MMHG | HEIGHT: 62 IN

## 2023-06-13 DIAGNOSIS — R11.2 NAUSEA AND VOMITING, UNSPECIFIED VOMITING TYPE: ICD-10-CM

## 2023-06-13 DIAGNOSIS — E11.43 GASTROPARESIS DUE TO DM: Primary | ICD-10-CM

## 2023-06-13 DIAGNOSIS — K31.84 GASTROPARESIS DUE TO DM: Primary | ICD-10-CM

## 2023-06-13 PROCEDURE — 3078F DIAST BP <80 MM HG: CPT | Performed by: FAMILY MEDICINE

## 2023-06-13 PROCEDURE — 99213 OFFICE O/P EST LOW 20 MIN: CPT | Performed by: FAMILY MEDICINE

## 2023-06-13 PROCEDURE — 3075F SYST BP GE 130 - 139MM HG: CPT | Performed by: FAMILY MEDICINE

## 2023-06-13 PROCEDURE — 3046F HEMOGLOBIN A1C LEVEL >9.0%: CPT | Performed by: FAMILY MEDICINE

## 2023-06-13 RX ORDER — METOCLOPRAMIDE 5 MG/1
5 TABLET ORAL
Qty: 40 TABLET | Refills: 0 | Status: SHIPPED | OUTPATIENT
Start: 2023-06-13

## 2023-06-13 NOTE — TELEPHONE ENCOUNTER
[facility] HAS CALLED TO LET PCP KNOW THAT THERE IS A POSSIBLE DRUG INTERACTION WITH PRESCRIPTION FOR metoclopramide (Reglan) 5 MG tablet.  PHARMACY IS REQUESTING A CALL BACK TO LET THEM KNOW THAT PCP IS AWARE.    CALL BACK NUMBER [phone]

## 2023-06-13 NOTE — PROGRESS NOTES
Subjective     Chief Complaint  GI Problem (Gastroparesis. diarrhea)    Subjective          Christi Carlotta Chapa is a 62 y.o. female who presents today to [facility] for follow up.    HPI:   History of Present Illness     Ms. Pham is a very pleasant 62-year-old female who presents today with GI upset.  She started taking Mounjaro about 1 week ago and has had significant nausea vomiting and abdominal discomfort since.  She also ready has a diagnosis of gastroparesis and feels that it is made much worse since starting this medication.    The following portions of the patient's history were reviewed and updated as appropriate: allergies, current medications, past family history, past medical history, past social history, past surgical history and problem list.    Objective     Objective     Allergy:   Allergies   Allergen Reactions   • Atorvastatin    • Crestor [Rosuvastatin Calcium] Myalgia   • Simvastatin    • Trulicity  [Dulaglutide]    • Sulfa Antibiotics Rash        Current Medications:   Current Outpatient Medications   Medication Sig Dispense Refill   • acetaminophen (TYLENOL) 500 MG tablet Take 2 tablets by mouth Every 6 (Six) Hours As Needed for Mild Pain  or Moderate Pain . 30 tablet 0   • ALPRAZolam (XANAX) 0.5 MG tablet 1 tablet. Takes 2 in the am and half in pm     • ARIPiprazole (ABILIFY) 15 MG tablet 7.5 mg. Half tab po daily     • aspirin (aspirin) 81 MG EC tablet Take 1 tablet by mouth Daily. 90 tablet 3   • Blood Glucose Monitoring Suppl device For use with glucose monitoring, daily; E11.65 1 each 0   • Continuous Blood Gluc Sensor (FreeStyle Gary 2 Sensor) misc CHANGE SENSOR EVERY 14 DAYS 2 each 5   • CRANBERRY CONCENTRATE PO Take  by mouth.     • DULoxetine (CYMBALTA) 60 MG capsule      • estradiol (Yuvafem) 10 MCG tablet vaginal tablet Insert 1 tablet into the vagina 2 (Two) Times a Week. 8 tablet 11   • ezetimibe (ZETIA) 10 MG tablet Take 1 tablet by mouth  Daily. 90 tablet 3   • fenofibrate (TRICOR) 145 MG tablet TAKE ONE TABLET BY MOUTH DAILY 90 tablet 3   • glimepiride (AMARYL) 4 MG tablet TAKE ONE TABLET BY MOUTH TWICE A  tablet 0   • glucose blood (True Metrix Blood Glucose Test) test strip Use as instructed 3 times daily 100 each 11   • ibuprofen (ADVIL,MOTRIN) 600 MG tablet Take 1 tablet by mouth Every 8 (Eight) Hours As Needed for Mild Pain . 30 tablet 0   • insulin aspart (NovoLOG FlexPen) 100 UNIT/ML solution pen-injector sc pen For use at mealtimes and per correctional scale, MDD 60 units 60 mL 1   • Insulin Glargine (LANTUS SOLOSTAR) 100 UNIT/ML injection pen Inject 52 Units under the skin into the appropriate area as directed Daily. 60 mL 1   • Insulin Pen Needle ([employer] Pen Needles) 32G X 4 MM misc Inject 1 each under the skin into the appropriate area as directed 4 (Four) Times a Day. For use with insulin injections 4 times daily 150 each 5   • levothyroxine (SYNTHROID, LEVOTHROID) 25 MCG tablet Take 1 tablet by mouth Daily. 30 tablet 3   • lisinopril (PRINIVIL,ZESTRIL) 2.5 MG tablet TAKE ONE TABLET BY MOUTH DAILY 30 tablet 3   • Magnesium Oxide 400 (240 Mg) MG tablet Take 1 tablet by mouth Daily.     • metFORMIN (GLUCOPHAGE) 1000 MG tablet TAKE ONE TABLET BY MOUTH TWICE A DAY WITH MEALS 180 tablet 1   • mirtazapine (REMERON) 15 MG tablet Take 1 tablet by mouth Every Night. 30 tablet 1   • Omega-3 Fatty Acids (FISH OIL PO) Take 1,000 mg by mouth 2 (two) times a day.     • pravastatin (PRAVACHOL) 40 MG tablet Take 1 tablet by mouth Daily. 90 tablet 3   • Vitamin D, Cholecalciferol, (CHOLECALCIFEROL) 10 MCG (400 UNIT) tablet Take 1 tablet by mouth Daily.     • empagliflozin (Jardiance) 10 MG tablet tablet Take 1 tablet by mouth Daily. 30 tablet 3   • metoclopramide (Reglan) 5 MG tablet Take 1 tablet by mouth 4 (Four) Times a Day Before Meals & at Bedtime. 40 tablet 0   • promethazine (PHENERGAN) 25 MG suppository Insert 1 suppository into the rectum  Every 6 (Six) Hours As Needed for Nausea or Vomiting. (Patient not taking: Reported on 2023) 12 suppository 0     No current facility-administered medications for this visit.       Past Medical History:  Past Medical History:   Diagnosis Date   • Anxiety    • Arthritis    • Cataract    • Coronary artery disease    • Depression    • Diabetes mellitus    • Fibromyalgia, primary    • GERD (gastroesophageal reflux disease)    • Hyperlipidemia    • Hypertension    • Hypothyroidism    • IBS (irritable bowel syndrome)    • Kidney stone    • New onset of headaches    • Obesity    • Tonsillitis 2022   • Type 2 diabetes mellitus        Past Surgical History:  Past Surgical History:   Procedure Laterality Date   • APPENDECTOMY     • BREAST EXCISIONAL BIOPSY Left     [place], [place]   • CHOLECYSTECTOMY     • COLONOSCOPY     • COSMETIC SURGERY     • EYE SURGERY      eye lid surgery    • KIDNEY STONE SURGERY     • RHINOPLASTY     • ROOT CANAL         Social History:  Social History     Socioeconomic History   • Marital status:    Tobacco Use   • Smoking status: Former     Packs/day: 1.00     Years: 27.00     Pack years: 27.00     Types: Cigarettes     Start date: 1972     Quit date: 1994     Years since quittin.4     Passive exposure: Past   • Smokeless tobacco: Never   • Tobacco comments:      QUIT    Vaping Use   • Vaping Use: Never used   Substance and Sexual Activity   • Alcohol use: No   • Drug use: No   • Sexual activity: Not Currently     Partners: Male     Birth control/protection: Post-menopausal       Family History:  Family History   Problem Relation Age of Onset   • Diabetes Mother    • COPD Mother    • Hypertension Mother    • Thyroid disease Mother    • Heart disease Mother    • Depression Mother    • Cancer Father         Melinoma   • Melanoma Father    • Diabetes Brother    • Depression Brother    • Anxiety disorder Brother    • Bleeding Disorder Daughter    • Fibromyalgia  "Daughter    • Obesity Other    • Thyroid disease Other    • Pulmonary embolism Other    • Melanoma Other    • Arthritis Other    • Hyperlipidemia Other    • Cancer Brother    • Melanoma Brother    • Anxiety disorder Brother    • Depression Brother    • Heart attack Brother    • Diabetes Brother    • Anxiety disorder Brother    • Depression Brother    • Heart failure Brother    • Thyroid disease Brother    • Heart disease Brother    • Hypertension Brother    • BRCA 1/2 Neg Hx    • Breast cancer Neg Hx    • Colon cancer Neg Hx    • Endometrial cancer Neg Hx    • Ovarian cancer Neg Hx          Vital Signs:   /78   Pulse 84   Temp 98.6 °F (37 °C) (Infrared)   Ht 157.5 cm (62.01\")   Wt 75.5 kg (166 lb 6.4 oz)   BMI 30.43 kg/m²      Physical Exam:  Physical Exam  Constitutional:       Appearance: She is not ill-appearing.   Eyes:      Pupils: Pupils are equal, round, and reactive to light.   Cardiovascular:      Rate and Rhythm: Normal rate.   Pulmonary:      Effort: Pulmonary effort is normal.   Neurological:      General: No focal deficit present.      Mental Status: She is alert. Mental status is at baseline.   Psychiatric:         Mood and Affect: Mood normal.         Behavior: Behavior normal.         Thought Content: Thought content normal.         Judgment: Judgment normal.               PHQ-9 Score  PHQ-9 Total Score:       Lab Review  Admission on 06/10/2023, Discharged on 06/10/2023   Component Date Value Ref Range Status   • Glucose 06/10/2023 279 (H)  65 - 99 mg/dL Final   • BUN 06/10/2023 25 (H)  8 - 23 mg/dL Final   • Creatinine 06/10/2023 0.81  0.57 - 1.00 mg/dL Final   • Sodium 06/10/2023 137  136 - 145 mmol/L Final   • Potassium 06/10/2023 4.5  3.5 - 5.2 mmol/L Final    Slight hemolysis detected by analyzer. Results may be affected.   • Chloride 06/10/2023 101  98 - 107 mmol/L Final   • CO2 06/10/2023 16.0 (L)  22.0 - 29.0 mmol/L Final   • Calcium 06/10/2023 10.2  8.6 - 10.5 mg/dL Final   • " Total Protein 06/10/2023 8.1  6.0 - 8.5 g/dL Final   • Albumin 06/10/2023 4.6  3.5 - 5.2 g/dL Final   • ALT (SGPT) 06/10/2023 43 (H)  1 - 33 U/L Final   • AST (SGOT) 06/10/2023 31  1 - 32 U/L Final   • Alkaline Phosphatase 06/10/2023 53  39 - 117 U/L Final   • Total Bilirubin 06/10/2023 0.5  0.0 - 1.2 mg/dL Final   • Globulin 06/10/2023 3.5  gm/dL Final    Calculated Result   • A/G Ratio 06/10/2023 1.3  g/dL Final   • BUN/Creatinine Ratio 06/10/2023 30.9 (H)  7.0 - 25.0 Final   • Anion Gap 06/10/2023 20.0 (H)  5.0 - 15.0 mmol/L Final   • eGFR 06/10/2023 81.7  >60.0 mL/min/1.73 Final   • Lipase 06/10/2023 24  13 - 60 U/L Final   • Color, UA 06/10/2023 Yellow  Yellow, Straw Final   • Appearance, UA 06/10/2023 Clear  Clear Final   • pH, UA 06/10/2023 5.5  5.0 - 8.0 Final   • Specific Gravity, UA 06/10/2023 1.010  1.005 - 1.030 Final   • Glucose, UA 06/10/2023 >=1000 mg/dL (3+) (A)  Negative Final   • Ketones, UA 06/10/2023 Trace (A)  Negative Final   • Bilirubin, UA 06/10/2023 Small (1+) (A)  Negative Final   • Blood, UA 06/10/2023 Trace (A)  Negative Final   • Protein, UA 06/10/2023 Trace (A)  Negative Final   • Leuk Esterase, UA 06/10/2023 Negative  Negative Final   • Nitrite, UA 06/10/2023 Negative  Negative Final   • Urobilinogen, UA 06/10/2023 0.2 E.U./dL  0.2 - 1.0 E.U./dL Final   • Lactate 06/10/2023 1.7  0.5 - 2.0 mmol/L Final    Falsely depressed results may occur on samples drawn from patients receiving N-Acetylcysteine (NAC) or Metamizole.   • Extra Tube 06/10/2023 Hold for add-ons.   Final    Auto resulted.   • Extra Tube 06/10/2023 hold for add-on   Final    Auto resulted   • Extra Tube 06/10/2023 Hold for add-ons.   Final    Auto resulted.   • Extra Tube 06/10/2023 Hold for add-ons.   Final    Auto resulted.   • Extra Tube 06/10/2023 Hold for add-ons.   Final    Auto resulted   • WBC 06/10/2023 9.75  3.40 - 10.80 10*3/mm3 Final   • RBC 06/10/2023 5.78 (H)  3.77 - 5.28 10*6/mm3 Final   • Hemoglobin  06/10/2023 14.3  12.0 - 15.9 g/dL Final   • Hematocrit 06/10/2023 48.0 (H)  34.0 - 46.6 % Final   • MCV 06/10/2023 83.0  79.0 - 97.0 fL Final   • MCH 06/10/2023 24.7 (L)  26.6 - 33.0 pg Final   • MCHC 06/10/2023 29.8 (L)  31.5 - 35.7 g/dL Final   • RDW 06/10/2023 15.8 (H)  12.3 - 15.4 % Final   • RDW-SD 06/10/2023 46.5  37.0 - 54.0 fl Final   • MPV 06/10/2023 10.1  6.0 - 12.0 fL Final   • Platelets 06/10/2023 323  140 - 450 10*3/mm3 Final   • Neutrophil % 06/10/2023 78.7 (H)  42.7 - 76.0 % Final   • Lymphocyte % 06/10/2023 15.1 (L)  19.6 - 45.3 % Final   • Monocyte % 06/10/2023 5.4  5.0 - 12.0 % Final   • Eosinophil % 06/10/2023 0.1 (L)  0.3 - 6.2 % Final   • Basophil % 06/10/2023 0.4  0.0 - 1.5 % Final   • Immature Grans % 06/10/2023 0.3  0.0 - 0.5 % Final   • Neutrophils, Absolute 06/10/2023 7.67 (H)  1.70 - 7.00 10*3/mm3 Final   • Lymphocytes, Absolute 06/10/2023 1.47  0.70 - 3.10 10*3/mm3 Final   • Monocytes, Absolute 06/10/2023 0.53  0.10 - 0.90 10*3/mm3 Final   • Eosinophils, Absolute 06/10/2023 0.01  0.00 - 0.40 10*3/mm3 Final   • Basophils, Absolute 06/10/2023 0.04  0.00 - 0.20 10*3/mm3 Final   • Immature Grans, Absolute 06/10/2023 0.03  0.00 - 0.05 10*3/mm3 Final   • nRBC 06/10/2023 0.2  0.0 - 0.2 /100 WBC Final   • Magnesium 06/10/2023 1.7  1.6 - 2.4 mg/dL Final   • Procalcitonin 06/10/2023 0.12  0.00 - 0.25 ng/mL Final   • RBC, UA 06/10/2023 0-2  None Seen, 0-2 /HPF Final   • WBC, UA 06/10/2023 3-5 (A)  None Seen, 0-2 /HPF Final   • Bacteria, UA 06/10/2023 None Seen  None Seen, Trace /HPF Final   • Squamous Epithelial Cells, UA 06/10/2023 3-6 (A)  None Seen, 0-2 /HPF Final   • Hyaline Casts, UA 06/10/2023 0-6  0 - 6 /LPF Final   • Methodology 06/10/2023 Automated Microscopy   Final   Lab on 06/05/2023   Component Date Value Ref Range Status   • 25 Hydroxy, Vitamin D 06/05/2023 23.7 (L)  30.0 - 100.0 ng/ml Final    Comment: Reference Range for Total Vitamin D 25(OH)  Deficiency <20.0 ng/mL  Insufficiency  21-29 ng/mL  Sufficiency  ng/mL  Toxicity >100 ng/ml     • TSH 06/05/2023 1.150  0.270 - 4.200 uIU/mL Final   • Total Cholesterol 06/05/2023 140  0 - 200 mg/dL Final    Comment: Cholesterol Reference Ranges  (U.S. Department of Health and Human Services ATP III  Classifications)  Desirable          <200 mg/dL  Borderline High    200-239 mg/dL  High Risk          >240 mg/dL  Triglyceride Reference Ranges  (U.S. Department of Health and Human Services ATP III  Classifications)  Normal           <150 mg/dL  Borderline High  150-199 mg/dL  High             200-499 mg/dL  Very High        >500 mg/dL  HDL Reference Ranges  (U.S. Department of Health and Human Services ATP III  Classifications)  Low     <40 mg/dl (major risk factor for CHD)  High    >60 mg/dl ('negative' risk factor for CHD)  LDL Reference Ranges  (U.S. Department of Health and Human Services ATP III  Classifications)  Optimal          <100 mg/dL  Near Optimal     100-129 mg/dL  Borderline High  130-159 mg/dL  High             160-189 mg/dL  Very High        >189 mg/dL     • Triglycerides 06/05/2023 215 (H)  0 - 150 mg/dL Final   • HDL Cholesterol 06/05/2023 39 (L)  40 - 60 mg/dL Final   • VLDL Cholesterol Arnulfo 06/05/2023 35  5 - 40 mg/dL Final   • LDL Chol Calc (NIH) 06/05/2023 66  0 - 100 mg/dL Final   • Glucose 06/05/2023 244 (H)  65 - 99 mg/dL Final   • BUN 06/05/2023 18  8 - 23 mg/dL Final   • Creatinine 06/05/2023 0.78  0.57 - 1.00 mg/dL Final   • EGFR Result 06/05/2023 85.5  >60.0 mL/min/1.73 Final    Comment: GFR Normal >60  Chronic Kidney Disease <60  Kidney Failure <15     • BUN/Creatinine Ratio 06/05/2023 23.1  7.0 - 25.0 Final   • Sodium 06/05/2023 139  136 - 145 mmol/L Final   • Potassium 06/05/2023 4.9  3.5 - 5.2 mmol/L Final   • Chloride 06/05/2023 100  98 - 107 mmol/L Final   • Total CO2 06/05/2023 26.1  22.0 - 29.0 mmol/L Final   • Calcium 06/05/2023 10.4  8.6 - 10.5 mg/dL Final   • Total Protein 06/05/2023 6.9  6.0 - 8.5 g/dL Final    • Albumin 06/05/2023 4.6  3.5 - 5.2 g/dL Final   • Globulin 06/05/2023 2.3  gm/dL Final   • A/G Ratio 06/05/2023 2.0  g/dL Final   • Total Bilirubin 06/05/2023 0.2  0.0 - 1.2 mg/dL Final   • Alkaline Phosphatase 06/05/2023 48  39 - 117 U/L Final   • AST (SGOT) 06/05/2023 38 (H)  1 - 32 U/L Final   • ALT (SGPT) 06/05/2023 44 (H)  1 - 33 U/L Final   • WBC 06/05/2023 5.98  3.40 - 10.80 10*3/mm3 Final   • RBC 06/05/2023 5.23  3.77 - 5.28 10*6/mm3 Final   • Hemoglobin 06/05/2023 13.0  12.0 - 15.9 g/dL Final   • Hematocrit 06/05/2023 41.4  34.0 - 46.6 % Final   • MCV 06/05/2023 79.2  79.0 - 97.0 fL Final   • MCH 06/05/2023 24.9 (L)  26.6 - 33.0 pg Final   • MCHC 06/05/2023 31.4 (L)  31.5 - 35.7 g/dL Final   • RDW 06/05/2023 15.0  12.3 - 15.4 % Final   • Platelets 06/05/2023 279  140 - 450 10*3/mm3 Final   • Neutrophil Rel % 06/05/2023 56.8  42.7 - 76.0 % Final   • Lymphocyte Rel % 06/05/2023 35.3  19.6 - 45.3 % Final   • Monocyte Rel % 06/05/2023 6.9  5.0 - 12.0 % Final   • Eosinophil Rel % 06/05/2023 0.0 (L)  0.3 - 6.2 % Final   • Basophil Rel % 06/05/2023 0.7  0.0 - 1.5 % Final   • Neutrophils Absolute 06/05/2023 3.40  1.70 - 7.00 10*3/mm3 Final   • Lymphocytes Absolute 06/05/2023 2.11  0.70 - 3.10 10*3/mm3 Final   • Monocytes Absolute 06/05/2023 0.41  0.10 - 0.90 10*3/mm3 Final   • Eosinophils Absolute 06/05/2023 0.00  0.00 - 0.40 10*3/mm3 Final   • Basophils Absolute 06/05/2023 0.04  0.00 - 0.20 10*3/mm3 Final   • Immature Granulocyte Rel % 06/05/2023 0.3  0.0 - 0.5 % Final   • Immature Grans Absolute 06/05/2023 0.02  0.00 - 0.05 10*3/mm3 Final   • nRBC 06/05/2023 0.0  0.0 - 0.2 /100 WBC Final   Office Visit on 05/19/2023   Component Date Value Ref Range Status   • Glucose 05/19/2023 276 (A)  70 - 130 mg/dL Final   • Lot Number 05/19/2023 [id number]   Final   • Expiration Date 05/19/2023 11-25-23   Final   • Hemoglobin A1C 05/19/2023 10.4  % Final   • Lot Number 05/19/2023 [id number]   Final   • Expiration Date  05/19/2023 1-31-25   Final   Lab on 05/10/2023   Component Date Value Ref Range Status   • Urine Culture 05/10/2023 No growth   Final   Office Visit on 05/10/2023   Component Date Value Ref Range Status   • Color 05/10/2023 Yellow  Yellow, Straw, Dark Yellow, Negar Final   • Clarity, UA 05/10/2023 Cloudy (A)  Clear Final   • Specific Gravity  05/10/2023 1.010  1.005 - 1.030 Final   • pH, Urine 05/10/2023 6.0  5.0 - 8.0 Final   • Leukocytes 05/10/2023 Negative  Negative Final   • Nitrite, UA 05/10/2023 Negative  Negative Final   • Protein, POC 05/10/2023 Negative  Negative mg/dL Final   • Glucose, UA 05/10/2023 3+ (A)  Negative mg/dL Final   • Ketones, UA 05/10/2023 Negative  Negative Final   • Urobilinogen, UA 05/10/2023 0.2 E.U./dL  Normal, 0.2 E.U./dL Final   • Bilirubin 05/10/2023 Negative  Negative Final   • Blood, UA 05/10/2023 2+ (A)  Negative Final   • Lot Number 05/10/2023 [id number]   Final   • Expiration Date 05/10/2023 10/25/24   Final   Office Visit on 04/24/2023   Component Date Value Ref Range Status   • SARS Antigen 04/24/2023 Detected (A)  Not Detected, Presumptive Negative Final   • Internal Control 04/24/2023 Passed  Passed Final   • Lot Number 04/24/2023 [id number]   Final   • Expiration Date 04/24/2023 8/23/23   Final        Radiology Results  CT Abdomen Pelvis Without Contrast    Result Date: 6/10/2023  Impression: Impression: 1.Stomach is moderately distended by food debris. No abnormal small bowel distention. 2.Nonobstructing bilateral nephrolithiasis. 3.Mild diffuse hepatic steatosis. Electronically Signed: Yelitza Mcintyre  6/10/2023 12:48 PM EDT  Workstation ID: [id number]         Assessment / Plan         Assessment and Plan   Diagnoses and all orders for this visit:    1. Gastroparesis due to DM (Primary)  -     metoclopramide (Reglan) 5 MG tablet; Take 1 tablet by mouth 4 (Four) Times a Day Before Meals & at Bedtime.  Dispense: 40 tablet; Refill: 0    2. Nausea and vomiting, unspecified  vomiting type      Most likely gastroparesis secondary to diabetes and made worse by Mounjaro.  Advised patient to stop taking Mounjaro.  I prescribed Reglan to be taken for the next few days until symptoms resolve.  She also can continue as needed Zofran and Phenergan as prescribed.    Discussed possible differential diagnoses, testing, treatment, recommended non-pharmacological interventions, risks, warning signs to monitor for that would indicate need for follow-up in clinic or ER. If no improvement with these regimens or you have new or worsening symptoms follow-up. Patient verbalizes understanding and agreement with plan of care. Denies further needs or concerns.     Patient was given instructions and counseling regarding her condition and for health maintenance advised.                Health Maintenance  Health Maintenance:   Health Maintenance Due   Topic Date Due   • DIABETIC EYE EXAM  03/17/2022   • DIABETIC FOOT EXAM  05/23/2023        Meds ordered during this visit  New Medications Ordered This Visit   Medications   • metoclopramide (Reglan) 5 MG tablet     Sig: Take 1 tablet by mouth 4 (Four) Times a Day Before Meals & at Bedtime.     Dispense:  40 tablet     Refill:  0       Meds stopped during this visit:  Medications Discontinued During This Encounter   Medication Reason   • Tirzepatide (Mounjaro) 2.5 MG/0.5ML solution pen-injector Side effects        Visit Diagnoses    ICD-10-CM ICD-9-CM   1. Gastroparesis due to DM  E11.43 250.60    K31.84 536.3   2. Nausea and vomiting, unspecified vomiting type  R11.2 787.01       Patient was given instructions and counseling regarding her condition or for health maintenance advice. Please see specific information pulled into the AVS if appropriate.     Follow Up   No follow-ups on file.          This document has been electronically signed by Carmela Paez DO   June 13, 2023 13:31 EDT    Dictated Utilizing Dragon Dictation: Part of this note may be an electronic  transcription/translation of spoken language to printed text using the Dragon Dictation System.    Carmela Paez D.O.  [facility] [place]

## 2023-07-25 RX ORDER — GLIMEPIRIDE 4 MG/1
TABLET ORAL
Qty: 180 TABLET | Refills: 0 | Status: SHIPPED | OUTPATIENT
Start: 2023-07-25

## 2023-07-25 NOTE — TELEPHONE ENCOUNTER
Rx Refill Note    Requested Prescriptions     Pending Prescriptions Disp Refills    glimepiride (AMARYL) 4 MG tablet [Pharmacy Med Name: GLIMEPIRIDE 4 MG TABLET] 180 tablet 0     Sig: TAKE ONE TABLET BY MOUTH TWICE A DAY        Last office visit with prescribing clinician: 5/19/2023       Next office visit with prescribing clinician: 10/11/2023     {    Rosa Morgan MA  07/25/23, 08:54 EDT

## 2023-08-16 NOTE — PROGRESS NOTES
Cardiology Outpatient Visit      Identification: Christi Chapa is a 62 y.o. female who resides in [place], [place]    Reason for visit:  Coronary Artery Disease      Subjective      Patient is a 62-year-old female who returns today for follow-up for coronary artery disease cardiac risk factors.  The patient reports since her last visit she has been having intermittent chest discomfort under her left breast.  This has been ongoing for the past 6 months.  It may or not be related to any exertional activities.  She admits she is fairly sedentary.  She has also noticed her breathing has gotten progressively worse particularly when climbing stairs.  She also reports her diabetes has not been well controlled as her last hemoglobin A1c was between 9 and 10.  She also reports lightheadedness and fatigue.  She has known coronary disease noted on a CT of the chest back in 2020.  Her last stress test was 2 years ago and did not suggest any ischemia.  She denies any orthopnea, palpitations, presyncope or syncope.    Review of Systems   Constitutional: Positive for malaise/fatigue.   Eyes:  Negative for vision loss in left eye and vision loss in right eye.   Cardiovascular:  Positive for chest pain and dyspnea on exertion. Negative for near-syncope, orthopnea, palpitations, paroxysmal nocturnal dyspnea and syncope.   Musculoskeletal:  Negative for myalgias.   Neurological:  Positive for dizziness. Negative for brief paralysis, excessive daytime sleepiness, focal weakness, numbness, paresthesias and weakness.   All other systems reviewed and are negative.    Allergies   Allergen Reactions    Atorvastatin     Crestor [Rosuvastatin Calcium] Myalgia    Simvastatin     Trulicity  [Dulaglutide]     Sulfa Antibiotics Rash         Current Outpatient Medications   Medication Instructions    acetaminophen (TYLENOL) 1,000 mg, Oral, Every 6 Hours PRN    ALPRAZolam (XANAX) 0.5 mg, Takes 2 in the am and half in pm    ARIPiprazole  "(ABILIFY) 7.5 mg, Half tab po daily    aspirin 81 mg, Oral, Daily    Blood Glucose Monitoring Suppl device For use with glucose monitoring, daily; E11.65    Continuous Blood Gluc Sensor (FreeStyle Gary 2 Sensor) misc CHANGE SENSOR EVERY 14 DAYS    CRANBERRY CONCENTRATE PO Oral    DULoxetine (CYMBALTA) 60 MG capsule No dose, route, or frequency recorded.    empagliflozin (JARDIANCE) 10 mg, Oral, Daily    ezetimibe (ZETIA) 10 mg, Oral, Daily    fenofibrate (TRICOR) 145 MG tablet TAKE ONE TABLET BY MOUTH DAILY    glimepiride (AMARYL) 4 MG tablet TAKE ONE TABLET BY MOUTH TWICE A DAY    glucose blood (True Metrix Blood Glucose Test) test strip Use as instructed 3 times daily    ibuprofen (ADVIL,MOTRIN) 600 mg, Oral, Every 8 Hours PRN    insulin aspart (NovoLOG FlexPen) 100 UNIT/ML solution pen-injector sc pen For use at mealtimes and per correctional scale, MDD 60 units    Insulin Glargine (LANTUS SOLOSTAR) 52 Units, Subcutaneous, Daily    Insulin Pen Needle ([employer] Pen Needles) 32G X 4 MM misc 1 each, Subcutaneous, 4 Times Daily, For use with insulin injections 4 times daily    levothyroxine (SYNTHROID, LEVOTHROID) 25 mcg, Oral, Daily    lisinopril (PRINIVIL,ZESTRIL) 2.5 MG tablet TAKE ONE TABLET BY MOUTH DAILY    Magnesium Oxide 400 (240 Mg) MG tablet 1 tablet, Oral, Daily    metFORMIN (GLUCOPHAGE) 1000 MG tablet TAKE ONE TABLET BY MOUTH TWICE A DAY WITH MEALS    mirtazapine (REMERON) 15 mg, Oral, Nightly    Omega-3 Fatty Acids (FISH OIL PO) 1,000 mg, Oral, 2 times daily    pravastatin (PRAVACHOL) 40 mg, Oral, Daily    Vitamin D (Cholecalciferol) (CHOLECALCIFEROL) 400 Units, Oral, Daily         Objective     /70 (BP Location: Left arm, Patient Position: Sitting)   Pulse 84   Ht 158.8 cm (62.5\")   Wt 78 kg (172 lb)   SpO2 94%   BMI 30.96 kg/mý       Constitutional:       Appearance: Healthy appearance. Well-developed.   Eyes:      General: Lids are normal. No scleral icterus.     Conjunctiva/sclera: " Conjunctivae normal.   HENT:      Head: Normocephalic and atraumatic.   Neck:      Thyroid: No thyromegaly.      Vascular: No carotid bruit or JVD.   Pulmonary:      Effort: Pulmonary effort is normal.      Breath sounds: Normal breath sounds. No wheezing. No rhonchi. No rales.   Cardiovascular:      Normal rate. Regular rhythm.      Murmurs: There is no murmur.      No gallop.  No rub.   Pulses:     Intact distal pulses.   Edema:     Peripheral edema absent.   Abdominal:      General: There is no distension.      Palpations: Abdomen is soft. There is no abdominal mass.   Musculoskeletal:      Cervical back: Normal range of motion. Skin:     General: Skin is warm and dry.      Findings: No rash.   Neurological:      General: No focal deficit present.      Mental Status: Alert and oriented to person, place, and time.      Gait: Gait is intact.   Psychiatric:         Attention and Perception: Attention normal.         Mood and Affect: Mood normal.         Behavior: Behavior normal.       Result Review  (reviewed with patient):        ECG 12 Lead    Date/Time: 8/22/2023 1:44 PM  Performed by: Tanisha Ulrich APRN  Authorized by: Tanisha Ulrich APRN   Comparison: compared with previous ECG from 8/10/2021  Similar to previous ECG  Rhythm: sinus rhythm    Clinical impression: abnormal EKG  Comments: Anterior septal infarct  QT/QTc 368/434 MS         Lab Results   Component Value Date    GLUCOSE 279 (H) 06/10/2023    BUN 25 (H) 06/10/2023    CREATININE 0.81 06/10/2023    EGFRRESULT 85.5 06/05/2023    EGFR 81.7 06/10/2023    BCR 30.9 (H) 06/10/2023    K 4.5 06/10/2023    CO2 16.0 (L) 06/10/2023    CALCIUM 10.2 06/10/2023    PROTENTOTREF 6.9 06/05/2023    ALBUMIN 4.6 06/10/2023    BILITOT 0.5 06/10/2023    AST 31 06/10/2023    ALT 43 (H) 06/10/2023     Lab Results   Component Value Date    WBC 9.75 06/10/2023    HGB 14.3 06/10/2023    HCT 48.0 (H) 06/10/2023    MCV 83.0 06/10/2023     06/10/2023     Lab  Results   Component Value Date    CHOL 166 01/12/2022    CHLPL 140 06/05/2023    TRIG 215 (H) 06/05/2023    HDL 39 (L) 06/05/2023    LDL 66 06/05/2023     Lab Results   Component Value Date    HGBA1C 10.4 05/19/2023           Assessment     Diagnoses and all orders for this visit:    1. Coronary artery disease involving native coronary artery of native heart with angina pectoris (Primary)  Overview:  CT chest (1/30/2020): Severe coronary artery calcifications.   Nuclear stress test (9/28/2021): No evidence of ischemia. LVEF >70%.     Assessment & Plan:  6-month history of intermittent left-sided chest pain  Obtain myocardial perfusion study  Continue aspirin 81 mg daily    Orders:  -     Stress Test With Myocardial Perfusion (1 Day); Future    2. Hyperlipidemia LDL goal <70  Overview:  High intensity statin therapy indicated given the presence of CAD  Intolerant to rosuvastatin, atorvastatin, and simvastatin    Assessment & Plan:  Continue fenofibrate 145 mg daily  Continue Pravachol 40 mg daily      3. Essential hypertension  Overview:  Target blood pressure <130/80 mmHg    Assessment & Plan:  Hypertension is controlled  Continue lisinopril 2.5 mg daily      4. Dyspnea on exertion  Assessment & Plan:  Obtain echocardiogram and myocardial perfusion study    Orders:  -     Adult Transthoracic Echo Complete W/ Cont if Necessary Per Protocol; Future    Other orders  -     ECG 12 Lead          Plan   Patient is having CCS/NYHA class II symptoms.  We will obtain myocardial perfusion study and echocardiogram  We will contact patient once results of tests are available      Follow-up   Return in about 6 months (around 2/22/2024), or if symptoms worsen or fail to improve, for Follow-up with Dr. Cerrato next visit.        Tanisha Ulrich, FRANCISCO JAVIER  8/22/2023

## 2023-08-22 ENCOUNTER — OFFICE VISIT (OUTPATIENT)
Dept: CARDIOLOGY | Facility: CLINIC | Age: 63
End: 2023-08-22
Payer: MEDICARE

## 2023-08-22 VITALS
HEART RATE: 84 BPM | SYSTOLIC BLOOD PRESSURE: 128 MMHG | DIASTOLIC BLOOD PRESSURE: 70 MMHG | OXYGEN SATURATION: 94 % | BODY MASS INDEX: 30.48 KG/M2 | HEIGHT: 63 IN | WEIGHT: 172 LBS

## 2023-08-22 DIAGNOSIS — E78.5 HYPERLIPIDEMIA LDL GOAL <70: Chronic | ICD-10-CM

## 2023-08-22 DIAGNOSIS — I10 ESSENTIAL HYPERTENSION: Chronic | ICD-10-CM

## 2023-08-22 DIAGNOSIS — I25.119 CORONARY ARTERY DISEASE INVOLVING NATIVE CORONARY ARTERY OF NATIVE HEART WITH ANGINA PECTORIS: Primary | Chronic | ICD-10-CM

## 2023-08-22 DIAGNOSIS — R06.09 DYSPNEA ON EXERTION: ICD-10-CM

## 2023-08-22 PROCEDURE — 1160F RVW MEDS BY RX/DR IN RCRD: CPT | Performed by: NURSE PRACTITIONER

## 2023-08-22 PROCEDURE — 99214 OFFICE O/P EST MOD 30 MIN: CPT | Performed by: NURSE PRACTITIONER

## 2023-08-22 PROCEDURE — 3078F DIAST BP <80 MM HG: CPT | Performed by: NURSE PRACTITIONER

## 2023-08-22 PROCEDURE — 3074F SYST BP LT 130 MM HG: CPT | Performed by: NURSE PRACTITIONER

## 2023-08-22 PROCEDURE — 93000 ELECTROCARDIOGRAM COMPLETE: CPT | Performed by: NURSE PRACTITIONER

## 2023-08-22 PROCEDURE — 1159F MED LIST DOCD IN RCRD: CPT | Performed by: NURSE PRACTITIONER

## 2023-08-22 NOTE — ASSESSMENT & PLAN NOTE
6-month history of intermittent left-sided chest pain  Obtain myocardial perfusion study  Continue aspirin 81 mg daily

## 2023-08-29 DIAGNOSIS — E11.9 TYPE 2 DIABETES MELLITUS WITHOUT COMPLICATION, WITH LONG-TERM CURRENT USE OF INSULIN: ICD-10-CM

## 2023-08-29 DIAGNOSIS — Z79.4 TYPE 2 DIABETES MELLITUS WITHOUT COMPLICATION, WITH LONG-TERM CURRENT USE OF INSULIN: ICD-10-CM

## 2023-08-29 RX ORDER — EMPAGLIFLOZIN 10 MG/1
TABLET, FILM COATED ORAL
Qty: 30 TABLET | Refills: 3 | Status: SHIPPED | OUTPATIENT
Start: 2023-08-29

## 2023-08-29 NOTE — TELEPHONE ENCOUNTER
Rx Refill Note    Requested Prescriptions     Pending Prescriptions Disp Refills    Jardiance 10 MG tablet tablet [Pharmacy Med Name: JARDIANCE 10 MG TABLET] 30 tablet 3     Sig: TAKE ONE TABLET BY MOUTH DAILY        Last office visit with prescribing clinician: 5/19/2023       Next office visit with prescribing clinician: 10/11/2023     {    Rosa Morgan MA  08/29/23, 14:59 EDT

## 2023-09-22 ENCOUNTER — TELEPHONE (OUTPATIENT)
Dept: CARDIOLOGY | Facility: CLINIC | Age: 63
End: 2023-09-22

## 2023-09-22 NOTE — TELEPHONE ENCOUNTER
"  Caller: Christi Chapa \"Fariba\"    Relationship: Self    Best call back number: [phone]    What form or medical record are you requesting: ALL EXISTING MEDICAL RECORDS    Who is requesting this form or medical record from you: DR CHAPIN    How would you like to receive the form or medical records (pick-up, mail, fax): FAX  If fax, what is the fax number: [phone]    Timeframe paperwork needed: ASAP    Additional notes: PLEASE FORWARD ALL RECORDS PER PATIENT REQUEST        "

## 2023-09-29 DIAGNOSIS — Z79.4 TYPE 2 DIABETES MELLITUS WITH HYPERGLYCEMIA, WITH LONG-TERM CURRENT USE OF INSULIN: ICD-10-CM

## 2023-09-29 DIAGNOSIS — E11.65 TYPE 2 DIABETES MELLITUS WITH HYPERGLYCEMIA, WITH LONG-TERM CURRENT USE OF INSULIN: ICD-10-CM

## 2023-09-29 NOTE — TELEPHONE ENCOUNTER
Rx Refill Note    Requested Prescriptions     Pending Prescriptions Disp Refills    metFORMIN (GLUCOPHAGE) 1000 MG tablet [Pharmacy Med Name: metFORMIN HCL 1,000MG TABLET, UU] 180 tablet 1     Sig: TAKE ONE TABLET BY MOUTH TWICE A DAY WITH MEALS        Last office visit with prescribing clinician: 5/19/2023       Next office visit with prescribing clinician: 10/11/2023     {    Rosa Morgan MA  09/29/23, 12:41 EDT

## 2023-10-11 ENCOUNTER — OFFICE VISIT (OUTPATIENT)
Dept: ENDOCRINOLOGY | Facility: CLINIC | Age: 63
End: 2023-10-11
Payer: MEDICARE

## 2023-10-11 VITALS
SYSTOLIC BLOOD PRESSURE: 134 MMHG | WEIGHT: 165 LBS | BODY MASS INDEX: 29.23 KG/M2 | HEIGHT: 63 IN | DIASTOLIC BLOOD PRESSURE: 80 MMHG | OXYGEN SATURATION: 98 % | HEART RATE: 98 BPM

## 2023-10-11 DIAGNOSIS — Z79.4 TYPE 2 DIABETES MELLITUS WITH HYPERGLYCEMIA, WITH LONG-TERM CURRENT USE OF INSULIN: Primary | ICD-10-CM

## 2023-10-11 DIAGNOSIS — E03.9 HYPOTHYROIDISM, UNSPECIFIED TYPE: ICD-10-CM

## 2023-10-11 DIAGNOSIS — E78.5 HYPERLIPIDEMIA, UNSPECIFIED HYPERLIPIDEMIA TYPE: ICD-10-CM

## 2023-10-11 DIAGNOSIS — E11.65 TYPE 2 DIABETES MELLITUS WITH HYPERGLYCEMIA, WITH LONG-TERM CURRENT USE OF INSULIN: Primary | ICD-10-CM

## 2023-10-11 LAB
EXPIRATION DATE: ABNORMAL
EXPIRATION DATE: ABNORMAL
GLUCOSE BLDC GLUCOMTR-MCNC: 132 MG/DL (ref 70–130)
HBA1C MFR BLD: 7.9 % (ref 4.5–5.7)
Lab: ABNORMAL
Lab: ABNORMAL

## 2023-10-11 RX ORDER — BUPROPION HYDROCHLORIDE 100 MG/1
100 TABLET ORAL DAILY
COMMUNITY

## 2023-10-11 RX ORDER — GLIMEPIRIDE 2 MG/1
6 TABLET ORAL EVERY MORNING
Qty: 90 TABLET | Refills: 5 | Status: SHIPPED | OUTPATIENT
Start: 2023-10-11 | End: 2024-10-10

## 2023-10-11 NOTE — PROGRESS NOTES
"Chief Complaint   Patient presents with    Diabetes        HPI   Christi Chapa is a 62 y.o. female had concerns including Diabetes.      Patient reports that she made significant dietary changes approximately 2 weeks ago which included an attempt to eliminate all carbohydrates and sugars.  She reports that following that she developed hypoglycemia and this discontinued all insulin therapy approximately 1 week ago.  Patient has lost 6 pounds since last visit.    Patient did not tolerate Mounjaro secondary to GI upset.    Patient is monitoring glucose via freestyle kassandra.    Current diabetes medications include the following:  Metformin 1000 mg twice daily  Jardiance 10 mg daily  Glimepiride 4 mg twice daily    Patient is taking levothyroxine 25 mcg daily for hypothyroidism.    Patient is taking pravastatin 40 mg daily, fenofibrate 145 mg daily, Zetia 10 mg daily for hyperlipidemia.    The following portions of the patient's history were reviewed and updated as appropriate: allergies, current medications, and past social history.    Review of Systems   ROS was reviewed and verified. All other ROS negative.    /80 (BP Location: Left arm, Patient Position: Sitting, Cuff Size: Adult)   Pulse 98   Ht 158.8 cm (62.5\")   Wt 74.8 kg (165 lb)   SpO2 98%   BMI 29.70 kg/mý      Physical Exam      Constitutional:  well developed; well nourished  no acute distress   ENT/Thyroid: not examined   Eyes: Conjunctiva: clear   Respiratory:  breathing is unlabored  clear to auscultation bilaterally   Cardiovascular:  regular rate and rhythm   Chest:  Not performed.   Abdomen: Not performed.   : Not performed.   Musculoskeletal: Not performed   Skin: not performed.   Neuro: mental status, speech normal   Psych: mood and affect are within normal limits       Labs/Imaging  CMP:  Lab Results   Component Value Date    BUN 25 (H) 06/10/2023    CREATININE 0.81 06/10/2023    EGFRIFNONA 81 09/20/2021    EGFRIFAFRI 111 12/30/2014 "    BCR 30.9 (H) 06/10/2023     06/10/2023    K 4.5 06/10/2023    CO2 16.0 (L) 06/10/2023    CALCIUM 10.2 06/10/2023    PROTENTOTREF 6.9 06/05/2023    ALBUMIN 4.6 06/10/2023    LABGLOBREF 2.3 06/05/2023    LABIL2 2.0 06/05/2023    BILITOT 0.5 06/10/2023    ALKPHOS 53 06/10/2023    AST 31 06/10/2023    ALT 43 (H) 06/10/2023     HbA1c:  Lab Results   Component Value Date    HGBA1C 7.9 (A) 10/11/2023    HGBA1C 10.4 05/19/2023     Microalbumin:  Lab Results   Component Value Date    MICROALBUR 25.8 09/20/2021      Latest Reference Range & Units 06/05/23 09:54   Total Cholesterol 0 - 200 mg/dL 140   HDL Cholesterol 40 - 60 mg/dL 39 (L)   LDL Cholesterol  0 - 100 mg/dL 66   Triglycerides 0 - 150 mg/dL 215 (H)   VLDL Cholesterol Arnulfo 5 - 40 mg/dL 35   (L): Data is abnormally low  (H): Data is abnormally high     Latest Reference Range & Units 10/11/23 15:16   Glucose 70 - 130 mg/dL 132 !   !: Data is abnormal     Latest Reference Range & Units 06/05/23 09:54   TSH Baseline 0.270 - 4.200 uIU/mL 1.150       Freestyle kassandra downloaded for review for dates ranging September 28 through October 11, 2023, CGM is active 83% of the time with average glucose 149, GMI 6.9% with 27.9% glucose variability.  77% of data within target range, 22% high, 1% very high.  Patient has mild postprandial rise in sugar.  She has mild hypoglycemia occurring overnight.     Diagnoses and all orders for this visit:    1. Type 2 diabetes mellitus with hyperglycemia, with long-term current use of insulin (Primary)  -     POC Glucose, Blood  -     POC Glycosylated Hemoglobin (Hb A1C)  -     Microalbumin / Creatinine Urine Ratio - Urine, Clean Catch; Future  Hemoglobin A1c 7.9%, not at goal.  Discussed that this will not fully reflect recent dietary changes.  CGM suggest glucose values near goal and patient having occasional hypoglycemia.  CGM containing 72 hours of glucose data was downloaded and reviewed  Plan to reduce glimepiride to 6 mg daily,  patient will take this in the morning.  Patient continue Jardiance 10 mg daily  Patient to continue metformin 1000 mg twice daily  Discussed correlation of blood glucose with carbohydrate intake.  Specifically, that if patient increase his carbohydrate intake from current, she will likely require further medication for glycemic control.  Reviewed goals for glycemic control  Reviewed management of hypoglycemia.  CMP, lipid panel reviewed from June 2023  Updating urine microalbumin today    2.  Hypothyroidism, unspecified type  TSH was normal in June 2023, continue levothyroxine 25 mcg daily    3.  Hyperlipidemia, unspecified hyperlipidemia type  Continue current therapy, most recent LDL was at goal    Other orders  -     glimepiride (Amaryl) 2 MG tablet; Take 3 tablets by mouth Every Morning.  Dispense: 90 tablet; Refill: 5         Return in about 4 months (around 2/11/2024). The patient was instructed to contact the clinic with any interval questions or concerns.    Sarah Amin MD   Endocrinologist    Dictated Utilizing Dragon Dictation

## 2023-10-13 RX ORDER — LEVOTHYROXINE SODIUM 0.03 MG/1
25 TABLET ORAL DAILY
Qty: 30 TABLET | Refills: 3 | Status: SHIPPED | OUTPATIENT
Start: 2023-10-13

## 2023-10-13 NOTE — TELEPHONE ENCOUNTER
"Caller: Christi Chapa \"Fariba\"    Relationship: Self    Best call back number: [phone]     Requested Prescriptions:   Requested Prescriptions     Pending Prescriptions Disp Refills    levothyroxine (SYNTHROID, LEVOTHROID) 25 MCG tablet 30 tablet 3     Sig: Take 1 tablet by mouth Daily.        Pharmacy where request should be sent: [facility] [id number] [address] AT [facility] [phone] [facility] [phone] [facility]     Last office visit with prescribing clinician: 6/5/2023   Last telemedicine visit with prescribing clinician: Visit date not found   Next office visit with prescribing clinician: 12/6/2023     Does the patient have less than a 3 day supply:  [x] Yes  [] No    Would you like a call back once the refill request has been completed: [] Yes [x] No    If the office needs to give you a call back, can they leave a voicemail: [] Yes [x] No    Arnaldo White Rep   10/13/23 11:24 EDT   "

## 2023-10-19 DIAGNOSIS — I10 ESSENTIAL HYPERTENSION: Chronic | ICD-10-CM

## 2023-10-19 RX ORDER — LISINOPRIL 2.5 MG/1
2.5 TABLET ORAL DAILY
Qty: 30 TABLET | Refills: 3 | Status: SHIPPED | OUTPATIENT
Start: 2023-10-19

## 2023-11-17 ENCOUNTER — TELEPHONE (OUTPATIENT)
Dept: ENDOCRINOLOGY | Facility: CLINIC | Age: 63
End: 2023-11-17
Payer: MEDICARE

## 2023-11-17 NOTE — TELEPHONE ENCOUNTER
"    Caller: Christi Chapa \"Fariba\"    Relationship: Self    Best call back number: [phone]    Requested Prescriptions:  (FreeStyle Gary 2 Sensor) misc [[id number]] (Order [id number])   Requested Prescriptions      No prescriptions requested or ordered in this encounter        Pharmacy where request should be sent:  [facility] [id number] [address] - [phone] [facility] [phone]  [phone] [facility]  [place] [id number]      Last office visit with prescribing clinician: 10/11/2023   Last telemedicine visit with prescribing clinician: Visit date not found   Next office visit with prescribing clinician: 3/6/2024     Additional details provided by patient: PT IS COMPLETLEY OUT OF MEDICATION    Does the patient have less than a 3 day supply:  [x] Yes  [] No    Would you like a call back once the refill request has been completed: [x] Yes [] No    If the office needs to give you a call back, can they leave a voicemail: [x] Yes [] No    Arnaldo Stokes Rep   11/17/23 16:21 EST           "

## 2023-11-20 NOTE — TELEPHONE ENCOUNTER
Rx Refill Note    Requested Prescriptions     Pending Prescriptions Disp Refills    Continuous Blood Gluc Sensor (FreeStyle Gary 2 Sensor) misc [Pharmacy Med Name: FREESTYLE GARY 2 SENSOR]       Sig: CHANGE SENSOR EVERY 14 DAYS        Last office visit with prescribing clinician: 10/11/2023       Next office visit with prescribing clinician: 3-6-24    {    Rosa Morgan MA  11/20/23, 10:08 EST

## 2023-11-21 ENCOUNTER — TELEPHONE (OUTPATIENT)
Dept: ENDOCRINOLOGY | Facility: CLINIC | Age: 63
End: 2023-11-21
Payer: MEDICARE

## 2023-11-21 NOTE — TELEPHONE ENCOUNTER
Called the pt and read her the message.     I told her to keep a log of what her blood sugar is before eating, what she ate and what her blood sugar is about 2 hrs after she eats.    She was told if she has any other issues to call us back.

## 2023-11-21 NOTE — TELEPHONE ENCOUNTER
"  Caller: Christi Chapa \"Fariba\"    Relationship: Self    Best call back number: [phone]      What is the best time to reach you: ANYTIME    Who are you requesting to speak with (clinical staff, provider,  specific staff member): CLINICAL STAFF    What was the call regarding: PATIENT STATES SHE WOULD LIKE TO SPEAK WITH CLINICAL STAFF ABOUT HER GLUCOSE NUMBERS. WHEN SHE GOES TO BED THE NUMBERS ARE OK, ONCE SHE WAKES IN THE MORNING THE NUMBER IS ABOUT 170. SHE'S NOT SURE IF SHE COULD GO BACK TO USING HER GLIMEPIRIDE. IF SO, HOW MUCH. PLEASE CALL ASAP        "

## 2023-11-21 NOTE — TELEPHONE ENCOUNTER
Please contact patient.  Average glucose 155 in the past 2 weeks.  Of note, patient was not supposed to discontinue glimepiride, she was only supposed to reduce dose to 6 mg.  If she is not taking any glimepiride, I would recommend she resume at 6 mg and we can reassess.  If she continues to have concerns we can schedule a sooner appointment.

## 2023-12-24 ENCOUNTER — READMISSION MANAGEMENT (OUTPATIENT)
Dept: CALL CENTER | Facility: HOSPITAL | Age: 63
End: 2023-12-24
Payer: MEDICARE

## 2023-12-25 NOTE — OUTREACH NOTE
Prep Survey      Flowsheet Row Responses   [facility] discharged from? Non-[facility]   Is LACE score < 7 ? Non-BH Discharge   Eligibility T[facility]   Date of Discharge 12/24/23   Discharge Disposition Home or Self Care   Discharge diagnosis Presence of aortocoronary bypass graft   Does the patient have one of the following disease processes/diagnoses(primary or secondary)? Other   Prep survey completed? Yes            Deepali AZAR - Registered Nurse

## 2023-12-26 ENCOUNTER — TRANSITIONAL CARE MANAGEMENT TELEPHONE ENCOUNTER (OUTPATIENT)
Dept: CALL CENTER | Facility: HOSPITAL | Age: 63
End: 2023-12-26
Payer: MEDICARE

## 2023-12-26 NOTE — OUTREACH NOTE
Call Center TCM Note      Flowsheet Row Responses   [facility] [place] patient discharged from? Non-BH  [[facility]]   Does the patient have one of the following disease processes/diagnoses(primary or secondary)? Other   TCM attempt successful? No  [No cureent VR]   Unsuccessful attempts Attempt 1   Revoked Reason Other  [Dr. Martinez no longer Pts PCP due to insurance per Pt]            Reina Lucas RN    12/26/2023, 10:38 EST

## 2024-01-18 ENCOUNTER — TELEPHONE (OUTPATIENT)
Dept: CARDIOLOGY | Facility: CLINIC | Age: 64
End: 2024-01-18
Payer: MEDICARE

## 2024-01-18 NOTE — TELEPHONE ENCOUNTER
"  Caller: Christi Chapa \"Fariba\"    Relationship: Self    Best call back number: [phone]     What is the best time to reach you: N/A    What was the call regarding: MISSED CALL     PTS PHONE SITUATION IS UNIQUE, REQUESTING WHATEVER INFO IS NEEDED BE RELAYED BE PUT HERE FOR RELAY, UNLESS THE OFFICE NEEDS TO SPEAK WIT PT DIRECTLY.     Is it okay if the provider responds through MyChart: CALL   "

## 2024-04-15 RX ORDER — PEN NEEDLE, DIABETIC 32GX 5/32"
NEEDLE, DISPOSABLE MISCELLANEOUS
Qty: 100 EACH | Refills: 0 | Status: SHIPPED | OUTPATIENT
Start: 2024-04-15

## 2024-04-15 NOTE — TELEPHONE ENCOUNTER
Last office visit with prescribing clinician: 10/11/2023       Next office visit with prescribing clinician: Visit date not found     {

## 2024-04-23 NOTE — TELEPHONE ENCOUNTER
Called the pt and spoke to Roque. He said the pt had the script transferred to another phar.    I called the pt and she said she did get the sensors.

## 2024-04-23 NOTE — TELEPHONE ENCOUNTER
Pt called back states [facility] pharmacy never received prescription for freestyle 2 kassandra sensor. Receipt confirmed by pharmacy at 8:24 AM. Pt  called pharmacy still don't have. Please send another prescription

## 2024-04-25 ENCOUNTER — OFFICE VISIT (OUTPATIENT)
Dept: ENDOCRINOLOGY | Facility: CLINIC | Age: 64
End: 2024-04-25
Payer: MEDICARE

## 2024-04-25 VITALS
HEART RATE: 74 BPM | HEIGHT: 63 IN | WEIGHT: 132 LBS | BODY MASS INDEX: 23.39 KG/M2 | OXYGEN SATURATION: 95 % | DIASTOLIC BLOOD PRESSURE: 70 MMHG | SYSTOLIC BLOOD PRESSURE: 126 MMHG

## 2024-04-25 DIAGNOSIS — E03.9 HYPOTHYROIDISM, UNSPECIFIED TYPE: ICD-10-CM

## 2024-04-25 DIAGNOSIS — Z79.4 TYPE 2 DIABETES MELLITUS WITH HYPERGLYCEMIA, WITH LONG-TERM CURRENT USE OF INSULIN: Primary | ICD-10-CM

## 2024-04-25 DIAGNOSIS — E11.65 TYPE 2 DIABETES MELLITUS WITH HYPERGLYCEMIA, WITH LONG-TERM CURRENT USE OF INSULIN: Primary | ICD-10-CM

## 2024-04-25 DIAGNOSIS — E78.5 HYPERLIPIDEMIA, UNSPECIFIED HYPERLIPIDEMIA TYPE: ICD-10-CM

## 2024-04-25 LAB
EXPIRATION DATE: ABNORMAL
EXPIRATION DATE: ABNORMAL
GLUCOSE BLDC GLUCOMTR-MCNC: 356 MG/DL (ref 70–130)
HBA1C MFR BLD: 8.1 % (ref 4.5–5.7)
Lab: ABNORMAL
Lab: ABNORMAL

## 2024-04-25 RX ORDER — ROSUVASTATIN CALCIUM 20 MG/1
20 TABLET, COATED ORAL DAILY
COMMUNITY

## 2024-04-25 RX ORDER — CLOPIDOGREL BISULFATE 75 MG/1
75 TABLET ORAL DAILY
COMMUNITY

## 2024-04-25 RX ORDER — INSULIN GLARGINE 100 [IU]/ML
20 INJECTION, SOLUTION SUBCUTANEOUS NIGHTLY
COMMUNITY
End: 2024-04-25

## 2024-04-25 RX ORDER — SACCHAROMYCES BOULARDII 250 MG
250 CAPSULE ORAL 2 TIMES DAILY
COMMUNITY

## 2024-04-25 RX ORDER — MIRTAZAPINE 30 MG/1
30 TABLET, FILM COATED ORAL NIGHTLY
COMMUNITY

## 2024-04-25 NOTE — PROGRESS NOTES
"Chief Complaint   Patient presents with    Diabetes        HPI   Christi Chapa is a 63 y.o. female had concerns including Diabetes.      Patient presents for follow-up of diabetes, she was last seen in October 2023. Patient underwent cardiac bypass in the interim since last visit, she reports multiple medication changes following surgery. She is monitoring blood glucose via freestyle kassandra.  Values have not been well-controlled.     Current diabetes mediations include the following:  Metformin 1000 mg twice daily  Jardiance 10 mg daily  Glimepiride 4 mg daily  Lantus 20 units daily- Patient reports that this was restarted in January.    Patient did not tolerate Mounjaro due to GI upset.    Patient continues on Levothyroxine 25 mcg daily.     Patient was change to Crestor 20 mg daily following surgery. She continues on fenofibrate 145 mg daily, zetia 10 mg daily.    Patient reports lisinopril was discontinued and metoprolol was started following surgery.    The following portions of the patient's history were reviewed and updated as appropriate: allergies, current medications, and past social history.    Review of Systems   Constitutional:  Positive for activity change.        /70 (BP Location: Left arm, Patient Position: Sitting, Cuff Size: Adult)   Pulse 74   Ht 158.8 cm (62.5\")   Wt 59.9 kg (132 lb)   SpO2 95%   BMI 23.76 kg/m²      Physical Exam      Constitutional:  well developed; well nourished  no acute distress   ENT/Thyroid: not examined   Eyes: Conjunctiva: clear   Respiratory:  breathing is unlabored  clear to auscultation bilaterally   Cardiovascular:  regular rate and rhythm   Chest:  Not performed.   Abdomen: Not performed.   : Not performed.   Musculoskeletal: Not performed   Skin: not performed.   Neuro: mental status, speech normal   Psych: mood and affect are within normal limits       Labs/Imaging  Glucose:  Lab Results   Component Value Date    POCGLU 356 (A) 04/25/2024 "     HbA1c:  Lab Results   Component Value Date    HGBA1C 8.1 (A) 04/25/2024    HGBA1C 7.9 (A) 10/11/2023     Microalbumin:  Lab Results   Component Value Date    MICROALBUR 25.8 09/20/2021     Lipid Panel:  Lab Results   Component Value Date    CHOL 166 01/12/2022    TRIG 215 (H) 06/05/2023    HDL 39 (L) 06/05/2023    LDL 66 06/05/2023    VLDL 35 06/05/2023    LDLHDL 1.69 01/12/2022     CMP:  Lab Results   Component Value Date    BUN 25 (H) 06/10/2023    CREATININE 0.81 06/10/2023    EGFRIFNONA 81 09/20/2021    EGFRIFAFRI 111 12/30/2014    BCR 30.9 (H) 06/10/2023     06/10/2023    K 4.5 12/21/2023    CO2 16.0 (L) 06/10/2023    CALCIUM 10.2 06/10/2023    PROTENTOTREF 6.9 06/05/2023    ALBUMIN 4.6 06/10/2023    LABGLOBREF 2.3 06/05/2023    LABIL2 2.0 06/05/2023    BILITOT 0.5 06/10/2023    ALKPHOS 53 06/10/2023    AST 31 06/10/2023    ALT 43 (H) 06/10/2023        Freestyle kassandra downloaded for review for dates ranging April 12 through April 25, 2024, CGM is active 53% of the time with average glucose 229, GMI 8.8% with 15.4% glucose variability.  Patient has hyperglycemia throughout the day, this worsens postprandially.  No pattern of hypoglycemia.    Diagnoses and all orders for this visit:    1. Type 2 diabetes mellitus with hyperglycemia, with long-term current use of insulin (Primary)  -     POC Glucose, Blood  -     POC Glycosylated Hemoglobin (Hb A1C)  Diabetes is poorly controlled, A1C 8.1%. Patient is hyperglycemic during office visit  CGM containing 72 hours of glucose data was downloaded and reviewed.  Increase lantus to 25 units daily. Patient advised to check glucose every morning before eating. If glucose consistently above 150, patient instructed to increase by 2 units every 3 days until morning sugar (before eating) is less than 150. Patient advised not to exceed 40 units daily. If patient experiencing hypoglycemia (glucose <70), patient should stop titration and contact clinic.   Continue  Metformin 1000 mg twice daily, Jardiance 10 mg daily,Glimepiride 4 mg daily  Reviewed goals for glycemic control.   BMP from 12/2023 with GFR >60    2. Hypothyroidism, unspecified type  TSH was normal in 6/2023, continue levothyroxine 25 mcg daily    3. Hyperlipidemia, unspecified hyperlipidemia type  Continue current medications    Other orders  -     Continuous Glucose Sensor (FreeStyle Gary 2 Sensor) misc; 1 each by Other route Every 14 (Fourteen) Days.  Dispense: 2 each; Refill: 5  -     Insulin Glargine (LANTUS SOLOSTAR) 100 UNIT/ML injection pen; 25 units daily, titrate per instructions, MDD 50 units  Dispense: 15 mL; Refill: 5    Received outside labs dated 4/12/2024  Urine albumin 0.1      Return in about 4 weeks (around 5/23/2024). The patient was instructed to contact the clinic with any interval questions or concerns.    Electronically signed by: Sarah Amin MD   Endocrinologist    Dictated Utilizing Dragon Dictation

## 2024-04-26 ENCOUNTER — TELEPHONE (OUTPATIENT)
Dept: ENDOCRINOLOGY | Facility: CLINIC | Age: 64
End: 2024-04-26
Payer: MEDICARE

## 2024-04-26 NOTE — TELEPHONE ENCOUNTER
----- Message from Rosa LIZ sent at 4/25/2024  6:50 PM EDT -----  Can you request recent urine microalbumin from patient's PCP office?

## 2024-05-20 ENCOUNTER — TELEPHONE (OUTPATIENT)
Dept: ENDOCRINOLOGY | Facility: CLINIC | Age: 64
End: 2024-05-20
Payer: MEDICARE

## 2024-05-20 NOTE — TELEPHONE ENCOUNTER
"    Caller: Christi Chapa \"Fariba\"    Relationship: Self    Best call back number: [phone]    Requested Prescriptions:  Continuous Glucose Sensor (FreeStyle Gary 2 Sensor  Requested Prescriptions      No prescriptions requested or ordered in this encounter        Pharmacy where request should be sent:  [facility] [id number] - [place] [id number] [facility] - [phone] [facility] [phone]  [phone] [address] [facility] [address] [id number]      Last office visit with prescribing clinician: 4/25/2024   Last telemedicine visit with prescribing clinician: Visit date not found   Next office visit with prescribing clinician: 5/31/2024     Additional details provided by patient:     Does the patient have less than a 3 day supply:  [x] Yes  [] No    Would you like a call back once the refill request has been completed: [] Yes [x] No    If the office needs to give you a call back, can they leave a voicemail: [] Yes [x] No    Arnaldo Stokes Rep   05/20/24 14:34 EDT       "

## 2024-05-20 NOTE — TELEPHONE ENCOUNTER
Rx Refill Note  Requested Prescriptions      No prescriptions requested or ordered in this encounter      Last office visit with prescribing clinician: 4/25/2024     Next office visit with prescribing clinician: 5/31/2024

## 2024-05-31 ENCOUNTER — OFFICE VISIT (OUTPATIENT)
Dept: ENDOCRINOLOGY | Facility: CLINIC | Age: 64
End: 2024-05-31
Payer: MEDICARE

## 2024-05-31 VITALS
HEART RATE: 94 BPM | BODY MASS INDEX: 29.06 KG/M2 | OXYGEN SATURATION: 95 % | DIASTOLIC BLOOD PRESSURE: 74 MMHG | WEIGHT: 164 LBS | SYSTOLIC BLOOD PRESSURE: 132 MMHG | HEIGHT: 63 IN

## 2024-05-31 DIAGNOSIS — Z79.4 TYPE 2 DIABETES MELLITUS WITH HYPERGLYCEMIA, WITH LONG-TERM CURRENT USE OF INSULIN: Primary | ICD-10-CM

## 2024-05-31 DIAGNOSIS — E11.65 TYPE 2 DIABETES MELLITUS WITH HYPERGLYCEMIA, WITH LONG-TERM CURRENT USE OF INSULIN: Primary | ICD-10-CM

## 2024-05-31 DIAGNOSIS — E78.5 HYPERLIPIDEMIA, UNSPECIFIED HYPERLIPIDEMIA TYPE: ICD-10-CM

## 2024-05-31 LAB
EXPIRATION DATE: ABNORMAL
GLUCOSE BLDC GLUCOMTR-MCNC: 285 MG/DL (ref 70–130)
Lab: ABNORMAL

## 2024-05-31 RX ORDER — DILTIAZEM HYDROCHLORIDE 240 MG/1
240 CAPSULE, COATED, EXTENDED RELEASE ORAL DAILY
COMMUNITY

## 2024-05-31 RX ORDER — GLIMEPIRIDE 4 MG/1
4 TABLET ORAL
COMMUNITY

## 2024-05-31 NOTE — PROGRESS NOTES
"Chief Complaint   Patient presents with    Diabetes        HPI   Christi Chapa is a 63 y.o. female had concerns including Diabetes.      Patient reports she has had multiple medication changes per cardiology in the interim since last visit.  She is no longer taking metoprolol.  She does report that activity remains limited.  She is scheduled to start physical therapy next week.    Current diabetes medications include the following:   Lantus 40 units daily  Glimepiride 4 mg daily  Jardiance 10 mg daily  Metformin 1000 mg twice daily    Patient is monitoring glucose via freestyle kassandra.    Patient continues on fenofibrate, Zetia, rosuvastatin.    The following portions of the patient's history were reviewed and updated as appropriate: allergies, current medications, and past social history.    Review of Systems   Musculoskeletal:  Positive for myalgias.      /74 (BP Location: Left arm, Patient Position: Sitting, Cuff Size: Adult)   Pulse 94   Ht 158.8 cm (62.5\")   Wt 74.4 kg (164 lb)   SpO2 95%   BMI 29.52 kg/m²      Physical Exam      Constitutional:  well developed; well nourished  no acute distress  obese - Body mass index is 29.52 kg/m².   ENT/Thyroid: not examined   Eyes: Conjunctiva: clear   Respiratory:  breathing is unlabored  clear to auscultation bilaterally   Cardiovascular:  regular rate and rhythm   Chest:  Not performed.   Abdomen: Not performed.   : Not performed.   Musculoskeletal: Not performed   Skin: not performed.   Neuro: mental status, speech normal   Psych: mood and affect are within normal limits       Labs/Imaging   Glucose:  Lab Results   Component Value Date    POCGLU 285 (A) 05/31/2024     HbA1c:  Lab Results   Component Value Date    HGBA1C 8.1 (A) 04/25/2024    HGBA1C 7.9 (A) 10/11/2023     Microalbumin:  Lab Results   Component Value Date    MICROALBUR 25.8 09/20/2021     Lipid Panel:  Lab Results   Component Value Date    CHOL 166 01/12/2022    TRIG 215 (H) 06/05/2023 "    HDL 39 (L) 06/05/2023    LDL 66 06/05/2023    VLDL 35 06/05/2023    LDLHDL 1.69 01/12/2022     CMP:  Lab Results   Component Value Date    BUN 25 (H) 06/10/2023    CREATININE 0.81 06/10/2023    EGFRIFNONA 81 09/20/2021    EGFRIFAFRI 111 12/30/2014    BCR 30.9 (H) 06/10/2023     06/10/2023    K 4.5 12/21/2023    CO2 16.0 (L) 06/10/2023    CALCIUM 10.2 06/10/2023    PROTENTOTREF 6.9 06/05/2023    ALBUMIN 4.6 06/10/2023    LABGLOBREF 2.3 06/05/2023    LABIL2 2.0 06/05/2023    BILITOT 0.5 06/10/2023    ALKPHOS 53 06/10/2023    AST 31 06/10/2023    ALT 43 (H) 06/10/2023      labs dated 4/12/2024  Urine albumin 0.1  GFR 71    Freestyle kassandra downloaded for review for dates ranging May 18 through May 31, 2024, CGM is active 71% of the time with average glucose 206, GMI 8.2% with 21.4% glucose variability.  28% of data is within target range, 58% high, 14% very high.  Patient is hyperglycemic throughout the day, this worsens postprandially.  No hypoglycemia noted.    Diagnoses and all orders for this visit:    1. Type 2 diabetes mellitus with hyperglycemia, with long-term current use of insulin (Primary)  -     POC Glucose, Blood  Hemoglobin A1c most recently 8.1% in April 2024, too soon to repeat  CGM containing 72 hours of glucose data was downloaded and reviewed  Patient will increase Lantus to 44 units daily.  She will continue titration by 2 units every 3 days until fasting glucose is consistently less than 150 or she reaches a maximum of 60 units.  Patient will continue glimepiride 4 mg daily, Jardiance 10 mg daily, metformin 1000 mg twice daily  BMP, urine microalbumin reviewed from April 2024    2. Hyperlipidemia, unspecified hyperlipidemia type  Patient will continue current medications    Other orders  -     Insulin Glargine (LANTUS SOLOSTAR) 100 UNIT/ML injection pen; 44 units daily, titrate per instructions, MDD 60 units         Return in about 2 months (around 7/31/2024). The patient was instructed to  contact the clinic with any interval questions or concerns.    Electronically signed by: Sarah Amin MD   Endocrinologist    Dictated Utilizing Dragon Dictation

## 2024-06-17 ENCOUNTER — PRIOR AUTHORIZATION (OUTPATIENT)
Dept: ENDOCRINOLOGY | Facility: CLINIC | Age: 64
End: 2024-06-17
Payer: MEDICARE

## 2024-06-17 NOTE — TELEPHONE ENCOUNTER
Called the pt and told her I was trying to do a PA on Gary 2 Sensors. I explained that when I try to do it I get told pt not found.    She said there a mess up on her coverage concerning Medicare and Social Security. There for she does not have coverage and knew she could not get the Gary at this time.

## 2024-06-24 RX ORDER — PEN NEEDLE, DIABETIC 32GX 5/32"
NEEDLE, DISPOSABLE MISCELLANEOUS
Qty: 100 EACH | Refills: 1 | Status: SHIPPED | OUTPATIENT
Start: 2024-06-24

## 2024-06-24 NOTE — TELEPHONE ENCOUNTER
Last office visit with prescribing clinician: 5/31/2024       Next office visit with prescribing clinician: 7/30/2024     {

## 2024-06-26 ENCOUNTER — OFFICE VISIT (OUTPATIENT)
Dept: FAMILY MEDICINE CLINIC | Facility: CLINIC | Age: 64
End: 2024-06-26
Payer: MEDICARE

## 2024-06-26 ENCOUNTER — LAB (OUTPATIENT)
Dept: LAB | Facility: HOSPITAL | Age: 64
End: 2024-06-26
Payer: MEDICARE

## 2024-06-26 VITALS
DIASTOLIC BLOOD PRESSURE: 60 MMHG | RESPIRATION RATE: 20 BRPM | BODY MASS INDEX: 29.27 KG/M2 | OXYGEN SATURATION: 95 % | SYSTOLIC BLOOD PRESSURE: 118 MMHG | HEART RATE: 74 BPM | WEIGHT: 165.2 LBS | TEMPERATURE: 97.8 F | HEIGHT: 63 IN

## 2024-06-26 DIAGNOSIS — E11.9 TYPE 2 DIABETES MELLITUS WITHOUT COMPLICATION, WITH LONG-TERM CURRENT USE OF INSULIN: ICD-10-CM

## 2024-06-26 DIAGNOSIS — E55.9 VITAMIN D DEFICIENCY: ICD-10-CM

## 2024-06-26 DIAGNOSIS — E78.5 HYPERLIPIDEMIA LDL GOAL <70: Chronic | ICD-10-CM

## 2024-06-26 DIAGNOSIS — I25.119 CORONARY ARTERY DISEASE INVOLVING NATIVE CORONARY ARTERY OF NATIVE HEART WITH ANGINA PECTORIS: Chronic | ICD-10-CM

## 2024-06-26 DIAGNOSIS — M79.7 FIBROMYALGIA: ICD-10-CM

## 2024-06-26 DIAGNOSIS — Z79.4 TYPE 2 DIABETES MELLITUS WITHOUT COMPLICATION, WITH LONG-TERM CURRENT USE OF INSULIN: ICD-10-CM

## 2024-06-26 DIAGNOSIS — E03.9 HYPOTHYROIDISM, UNSPECIFIED TYPE: ICD-10-CM

## 2024-06-26 DIAGNOSIS — I10 ESSENTIAL HYPERTENSION: Chronic | ICD-10-CM

## 2024-06-26 DIAGNOSIS — Z00.00 MEDICARE ANNUAL WELLNESS VISIT, SUBSEQUENT: Primary | ICD-10-CM

## 2024-06-26 LAB
25(OH)D3 SERPL-MCNC: 32 NG/ML (ref 30–100)
ALBUMIN SERPL-MCNC: 4.4 G/DL (ref 3.5–5.2)
ALBUMIN/GLOB SERPL: 1.6 G/DL
ALP SERPL-CCNC: 51 U/L (ref 39–117)
ALT SERPL W P-5'-P-CCNC: 19 U/L (ref 1–33)
ANION GAP SERPL CALCULATED.3IONS-SCNC: 15.6 MMOL/L (ref 5–15)
AST SERPL-CCNC: 22 U/L (ref 1–32)
BASOPHILS # BLD AUTO: 0.08 10*3/MM3 (ref 0–0.2)
BASOPHILS NFR BLD AUTO: 1 % (ref 0–1.5)
BILIRUB SERPL-MCNC: 0.2 MG/DL (ref 0–1.2)
BUN SERPL-MCNC: 19 MG/DL (ref 8–23)
BUN/CREAT SERPL: 25 (ref 7–25)
CALCIUM SPEC-SCNC: 9.7 MG/DL (ref 8.6–10.5)
CHLORIDE SERPL-SCNC: 105 MMOL/L (ref 98–107)
CHOLEST SERPL-MCNC: 92 MG/DL (ref 0–200)
CO2 SERPL-SCNC: 21.4 MMOL/L (ref 22–29)
CREAT SERPL-MCNC: 0.76 MG/DL (ref 0.57–1)
DEPRECATED RDW RBC AUTO: 49.6 FL (ref 37–54)
EGFRCR SERPLBLD CKD-EPI 2021: 87.6 ML/MIN/1.73
EOSINOPHIL # BLD AUTO: 0.61 10*3/MM3 (ref 0–0.4)
EOSINOPHIL NFR BLD AUTO: 8 % (ref 0.3–6.2)
ERYTHROCYTE [DISTWIDTH] IN BLOOD BY AUTOMATED COUNT: 17.5 % (ref 12.3–15.4)
GLOBULIN UR ELPH-MCNC: 2.7 GM/DL
GLUCOSE SERPL-MCNC: 203 MG/DL (ref 65–99)
HCT VFR BLD AUTO: 43.1 % (ref 34–46.6)
HDLC SERPL-MCNC: 34 MG/DL (ref 40–60)
HGB BLD-MCNC: 13.3 G/DL (ref 12–15.9)
IMM GRANULOCYTES # BLD AUTO: 0.02 10*3/MM3 (ref 0–0.05)
IMM GRANULOCYTES NFR BLD AUTO: 0.3 % (ref 0–0.5)
LDLC SERPL CALC-MCNC: 33 MG/DL (ref 0–100)
LDLC/HDLC SERPL: 0.85 {RATIO}
LYMPHOCYTES # BLD AUTO: 2.16 10*3/MM3 (ref 0.7–3.1)
LYMPHOCYTES NFR BLD AUTO: 28.2 % (ref 19.6–45.3)
MCH RBC QN AUTO: 24.4 PG (ref 26.6–33)
MCHC RBC AUTO-ENTMCNC: 30.9 G/DL (ref 31.5–35.7)
MCV RBC AUTO: 78.9 FL (ref 79–97)
MONOCYTES # BLD AUTO: 0.51 10*3/MM3 (ref 0.1–0.9)
MONOCYTES NFR BLD AUTO: 6.7 % (ref 5–12)
NEUTROPHILS NFR BLD AUTO: 4.28 10*3/MM3 (ref 1.7–7)
NEUTROPHILS NFR BLD AUTO: 55.8 % (ref 42.7–76)
NRBC BLD AUTO-RTO: 0 /100 WBC (ref 0–0.2)
PLATELET # BLD AUTO: 292 10*3/MM3 (ref 140–450)
PMV BLD AUTO: 10.3 FL (ref 6–12)
POTASSIUM SERPL-SCNC: 4.4 MMOL/L (ref 3.5–5.2)
PROT SERPL-MCNC: 7.1 G/DL (ref 6–8.5)
RBC # BLD AUTO: 5.46 10*6/MM3 (ref 3.77–5.28)
SODIUM SERPL-SCNC: 142 MMOL/L (ref 136–145)
TRIGL SERPL-MCNC: 146 MG/DL (ref 0–150)
TSH SERPL DL<=0.05 MIU/L-ACNC: 1.32 UIU/ML (ref 0.27–4.2)
VLDLC SERPL-MCNC: 25 MG/DL (ref 5–40)
WBC NRBC COR # BLD AUTO: 7.66 10*3/MM3 (ref 3.4–10.8)

## 2024-06-26 PROCEDURE — 1126F AMNT PAIN NOTED NONE PRSNT: CPT | Performed by: FAMILY MEDICINE

## 2024-06-26 PROCEDURE — 1160F RVW MEDS BY RX/DR IN RCRD: CPT | Performed by: FAMILY MEDICINE

## 2024-06-26 PROCEDURE — 3052F HG A1C>EQUAL 8.0%<EQUAL 9.0%: CPT | Performed by: FAMILY MEDICINE

## 2024-06-26 PROCEDURE — 1159F MED LIST DOCD IN RCRD: CPT | Performed by: FAMILY MEDICINE

## 2024-06-26 PROCEDURE — 1170F FXNL STATUS ASSESSED: CPT | Performed by: FAMILY MEDICINE

## 2024-06-26 PROCEDURE — 80053 COMPREHEN METABOLIC PANEL: CPT

## 2024-06-26 PROCEDURE — 3074F SYST BP LT 130 MM HG: CPT | Performed by: FAMILY MEDICINE

## 2024-06-26 PROCEDURE — G0439 PPPS, SUBSEQ VISIT: HCPCS | Performed by: FAMILY MEDICINE

## 2024-06-26 PROCEDURE — 3078F DIAST BP <80 MM HG: CPT | Performed by: FAMILY MEDICINE

## 2024-06-26 PROCEDURE — 82306 VITAMIN D 25 HYDROXY: CPT

## 2024-06-26 PROCEDURE — 80061 LIPID PANEL: CPT

## 2024-06-26 PROCEDURE — 96160 PT-FOCUSED HLTH RISK ASSMT: CPT | Performed by: FAMILY MEDICINE

## 2024-06-26 PROCEDURE — 85025 COMPLETE CBC W/AUTO DIFF WBC: CPT

## 2024-06-26 PROCEDURE — 99214 OFFICE O/P EST MOD 30 MIN: CPT | Performed by: FAMILY MEDICINE

## 2024-06-26 PROCEDURE — 84443 ASSAY THYROID STIM HORMONE: CPT

## 2024-06-26 RX ORDER — METOPROLOL SUCCINATE 100 MG/1
100 TABLET, EXTENDED RELEASE ORAL DAILY
COMMUNITY
Start: 2024-01-19 | End: 2025-01-18

## 2024-06-26 RX ORDER — LISINOPRIL 10 MG/1
10 TABLET ORAL DAILY
COMMUNITY
Start: 2024-06-21 | End: 2025-06-21

## 2024-06-26 NOTE — PROGRESS NOTES
The ABCs of the Annual Wellness Visit  Subsequent Medicare Wellness Visit    Subjective    Christi Chapa is a 63 y.o. female who presents for a Subsequent Medicare Wellness Visit.    She was seeing a card at [facility] while [facility] was not covered with ,     She had CATH with Opal found 3 blockages so she had CABG. She is not feeling any better.  She reports she still having chest soreness.  Surg 12/20/23.       The following portions of the patient's history were reviewed and   updated as appropriate: allergies, current medications, past family history, past medical history, past social history, past surgical history, and problem list.    Compared to one year ago, the patient feels her physical   health is worse. More tired from medications for heart.  All chest wall is hurting.  She has tried cardio pulm rehab.  She is doing to reg therapy.  She reports he is killing her and making her sore.  She reports she had to yell at her therapist.  Did some massage for fibromyalgia.  She is taking cymbalta.      Compared to one year ago, the patient feels her mental   health is worse. She is hurting all the time.      Recent Hospitalizations:  She was admitted within the past 365 days at [facility] 12/23 [facility].       Current Medical Providers:  Patient Care Team:  Emi Martinez MD as PCP - General (Family Medicine)  Ant Galvan MD (Inactive) as Consulting Physician (Gynecology)  Sarah Amin MD as Consulting Physician (Endocrinology)  Cesar Cerrato IV, MD as Consulting Physician (Interventional Cardiology)  Tanisha Ulrich APRN as Nurse Practitioner (Interventional Cardiology)    Outpatient Medications Prior to Visit   Medication Sig Dispense Refill    acetaminophen (TYLENOL) 500 MG tablet Take 2 tablets by mouth Every 6 (Six) Hours As Needed for Mild Pain  or Moderate Pain . 30 tablet 0    ALPRAZolam (XANAX) 0.5 MG tablet 1 tablet. Takes 2 in the am and half in pm      aspirin  (aspirin) 81 MG EC tablet Take 1 tablet by mouth Daily. 90 tablet 3    Blood Glucose Monitoring Suppl device For use with glucose monitoring, daily; E11.65 1 each 0    clopidogrel (PLAVIX) 75 MG tablet Take 1 tablet by mouth Daily.      Continuous Glucose Sensor (FreeStyle Gary 2 Sensor) misc 1 each by Other route Every 14 (Fourteen) Days. 2 each 1    CRANBERRY CONCENTRATE PO Take  by mouth.      DULoxetine HCl (CYMBALTA PO) Take 120 mg by mouth Daily.      ezetimibe (ZETIA) 10 MG tablet Take 1 tablet by mouth Daily. 90 tablet 3    fenofibrate (TRICOR) 145 MG tablet TAKE ONE TABLET BY MOUTH DAILY 90 tablet 3    glimepiride (AMARYL) 4 MG tablet Take 1 tablet by mouth Every Morning Before Breakfast.      glucose blood (True Metrix Blood Glucose Test) test strip Use as instructed 3 times daily 100 each 11    Insulin Glargine (LANTUS SOLOSTAR) 100 UNIT/ML injection pen 44 units daily, titrate per instructions, MDD 60 units (Patient taking differently: Inject 50 Units under the skin into the appropriate area as directed. 44 units daily, titrate per instructions, MDD 60 units)      Insulin Pen Needle ([employer] Pen Needles) 32G X 4 MM misc USE WITH INSULIN INJECTION UNDER THE SKIN AS DIRECTED FOUR TIMES A  each 1    Jardiance 10 MG tablet tablet TAKE ONE TABLET BY MOUTH DAILY 30 tablet 3    levothyroxine (SYNTHROID, LEVOTHROID) 25 MCG tablet Take 1 tablet by mouth Daily. 30 tablet 3    lisinopril (PRINIVIL,ZESTRIL) 10 MG tablet Take 1 tablet by mouth Daily.      Magnesium Oxide 400 (240 Mg) MG tablet Take 1 tablet by mouth Daily.      metFORMIN (GLUCOPHAGE) 1000 MG tablet TAKE ONE TABLET BY MOUTH TWICE A DAY WITH MEALS 180 tablet 0    metoprolol succinate XL (TOPROL-XL) 100 MG 24 hr tablet Take 1 tablet by mouth Daily.      mirtazapine (REMERON) 30 MG tablet Take 1 tablet by mouth Every Night.      Omega-3 Fatty Acids (FISH OIL PO) Take 1,000 mg by mouth 2 (two) times a day.      rosuvastatin (CRESTOR) 20 MG tablet  Take 1 tablet by mouth Daily.      dilTIAZem CD (CARDIZEM CD) 240 MG 24 hr capsule Take 1 capsule by mouth Daily.      DULoxetine (CYMBALTA) 60 MG capsule        No facility-administered medications prior to visit.       No opioid medication identified on active medication list. I have reviewed chart for other potential  high risk medication/s and harmful drug interactions in the elderly.        Aspirin is on active medication list. Aspirin use is indicated based on review of current medical condition/s. Pros and cons of this therapy have been discussed today. Benefits of this medication outweigh potential harm.  Patient has been encouraged to continue taking this medication.  .      Patient Active Problem List   Diagnosis    Anxiety    Fibromyalgia    Gastroparesis    Gastroesophageal reflux disease    Granuloma annulare    Gastrointestinal ulcer due to Helicobacter pylori    Hyperlipidemia LDL goal <70    Essential hypertension    Hypothyroidism    Vitamin D deficiency    Necrobiosis diabeticorum    Spleen enlargement    uterine Fibroids    Seborrheic keratoses    Fatty liver    IBS (irritable bowel syndrome)    Ovarian cyst    Yeast vaginitis    GERD (gastroesophageal reflux disease)    Depression    Menopause    Vaginal atrophy    Coronary artery disease involving native coronary artery of native heart with angina pectoris    Type 2 diabetes mellitus, with long-term current use of insulin    Urinary frequency    Medicare annual wellness visit, subsequent    Obesity (BMI 30-39.9)    Dysuria    COVID-19 virus infection    Immunization due    Dyspnea on exertion     Advance Care Planning   Advance Care Planning     Advance Directive is not on file.  ACP discussion was held with the patient during this visit. Patient has an advance directive (not in EMR), copy requested.     Objective    Vitals:    06/26/24 0905   BP: 118/60   Pulse: 74   Resp: 20   Temp: 97.8 °F (36.6 °C)   TempSrc: Temporal   SpO2: 95%   Weight:  "74.9 kg (165 lb 3.2 oz)   Height: 158.8 cm (62.52\")     Estimated body mass index is 29.71 kg/m² as calculated from the following:    Height as of this encounter: 158.8 cm (62.52\").    Weight as of this encounter: 74.9 kg (165 lb 3.2 oz).    BMI is >= 25 and <30. (Overweight) The following options were offered after discussion;: exercise counseling/recommendations and nutrition counseling/recommendations      Does the patient have evidence of cognitive impairment? No    Lab Results   Component Value Date    HGBA1C 8.1 (A) 2024        HEALTH RISK ASSESSMENT    Smoking Status:  Social History     Tobacco Use   Smoking Status Former    Current packs/day: 0.00    Average packs/day: 1 pack/day for 27.0 years (27.0 ttl pk-yrs)    Types: Cigarettes    Start date: 1972    Quit date: 1994    Years since quittin.5    Passive exposure: Past   Smokeless Tobacco Never   Tobacco Comments     QUIT      Alcohol Consumption:  Social History     Substance and Sexual Activity   Alcohol Use No     Fall Risk Screen:    STEADI Fall Risk Assessment was completed, and patient is at LOW risk for falls.Assessment completed on:2024    Depression Screenin/26/2024     9:04 AM   PHQ-2/PHQ-9 Depression Screening   Little Interest or Pleasure in Doing Things 0-->not at all   Feeling Down, Depressed or Hopeless 0-->not at all   PHQ-9: Brief Depression Severity Measure Score 0       Health Habits and Functional and Cognitive Screenin/26/2024     9:02 AM   Functional & Cognitive Status   Do you have difficulty preparing food and eating? No   Do you have difficulty bathing yourself, getting dressed or grooming yourself? No   Do you have difficulty using the toilet? No   Do you have difficulty moving around from place to place? No   Do you have trouble with steps or getting out of a bed or a chair? No   Current Diet Well Balanced Diet   Dental Exam Up to date   Eye Exam Up to date   Exercise (times per " week) 2 times per week   Current Exercises Include Walking;Light Weights   Do you need help using the phone?  No   Are you deaf or do you have serious difficulty hearing?  No   Do you need help to go to places out of walking distance? No   Do you need help shopping? No   Do you need help preparing meals?  No   Do you need help with housework?  Yes   Do you need help with laundry? Yes   Do you need help taking your medications? Yes   Do you need help managing money? No   Do you ever drive or ride in a car without wearing a seat belt? No   Have you felt unusual stress, anger or loneliness in the last month? Yes   Who do you live with? Spouse   If you need help, do you have trouble finding someone available to you? No   Have you been bothered in the last four weeks by sexual problems? No   Do you have difficulty concentrating, remembering or making decisions? Yes       Age-appropriate Screening Schedule:  Refer to the list below for future screening recommendations based on patient's age, sex and/or medical conditions. Orders for these recommended tests are listed in the plan section. The patient has been provided with a written plan.    Health Maintenance   Topic Date Due    ZOSTER VACCINE (1 of 2) Never done    RSV Vaccine - Adults (1 - 1-dose 60+ series) Never done    DIABETIC EYE EXAM  03/17/2022    DIABETIC FOOT EXAM  05/23/2023    COVID-19 Vaccine (3 - 2023-24 season) 09/01/2023    LIPID PANEL  06/05/2024    BMI FOLLOWUP  06/05/2024    MAMMOGRAM  09/15/2024    INFLUENZA VACCINE  08/01/2024    ANNUAL WELLNESS VISIT  10/23/2024    HEMOGLOBIN A1C  10/25/2024    URINE MICROALBUMIN  04/12/2025    PAP SMEAR  07/28/2025    COLORECTAL CANCER SCREENING  09/20/2025    TDAP/TD VACCINES (2 - Td or Tdap) 08/17/2026    HEPATITIS C SCREENING  Completed    Pneumococcal Vaccine 0-64  Completed                  CMS Preventative Services Quick Reference  Risk Factors Identified During Encounter  Chronic Pain:  referred to  "rheum  Depression/Dysphoria: Current medication is effective, no change recommended  Inactivity/Sedentary: Patient was advised to exercise at least 150 minutes a week per CDC recommendations.  Polypharmacy: Medication List reviewed  The above risks/problems have been discussed with the patient.  Pertinent information has been shared with the patient in the After Visit Summary.  An After Visit Summary and PPPS were made available to the patient.    Follow Up:   Next Medicare Wellness visit to be scheduled in 1 year.       Additional E&M Note during same encounter follows:  Patient has multiple medical problems which are significant and separately identifiable that require additional work above and beyond the Medicare Wellness Visit.      Chief Complaint  Medicare Wellness-subsequent    Subjective        HPI  Christi Chapa is also being seen today for she has appt to see endo at end of July.  Last A1c 8.1.          Objective   Vital Signs:  /60   Pulse 74   Temp 97.8 °F (36.6 °C) (Temporal)   Resp 20   Ht 158.8 cm (62.52\")   Wt 74.9 kg (165 lb 3.2 oz)   SpO2 95%   BMI 29.71 kg/m²     Physical Exam     The following data was reviewed by: Emi Martinez MD on 06/26/2024:  Common labs          12/20/2023    19:42 12/21/2023    00:03 4/25/2024    09:41   Common Labs   Potassium 4.2     4.5        Hemoglobin A1C   8.1       Details          This result is from an external source.             Data reviewed : reviewed card and endo notes.            Assessment and Plan   Diagnoses and all orders for this visit:    1. Medicare annual wellness visit, subsequent (Primary)    2. Type 2 diabetes mellitus without complication, with long-term current use of insulin  -     CBC & Differential; Future  -     Comprehensive Metabolic Panel; Future  -     Hemoglobin A1c; Future    3. Hypothyroidism, unspecified type  -     TSH; Future    4. Essential hypertension    5. Hyperlipidemia LDL goal <70  -     Lipid Panel; " Future    6. Coronary artery disease involving native coronary artery of native heart with angina pectoris    7. Fibromyalgia  -     Ambulatory Referral to Rheumatology             Follow Up   No follow-ups on file.  Patient was given instructions and counseling regarding her condition or for health maintenance advice. Please see specific information pulled into the AVS if appropriate.

## 2024-07-01 DIAGNOSIS — M79.7 FIBROMYALGIA: Primary | ICD-10-CM

## 2024-07-30 ENCOUNTER — OFFICE VISIT (OUTPATIENT)
Dept: ENDOCRINOLOGY | Facility: CLINIC | Age: 64
End: 2024-07-30
Payer: MEDICARE

## 2024-07-30 VITALS
BODY MASS INDEX: 29.41 KG/M2 | WEIGHT: 166 LBS | OXYGEN SATURATION: 94 % | HEIGHT: 63 IN | HEART RATE: 78 BPM | SYSTOLIC BLOOD PRESSURE: 142 MMHG | DIASTOLIC BLOOD PRESSURE: 94 MMHG

## 2024-07-30 DIAGNOSIS — E78.5 HYPERLIPIDEMIA, UNSPECIFIED HYPERLIPIDEMIA TYPE: ICD-10-CM

## 2024-07-30 DIAGNOSIS — Z79.4 TYPE 2 DIABETES MELLITUS WITH HYPERGLYCEMIA, WITH LONG-TERM CURRENT USE OF INSULIN: Primary | ICD-10-CM

## 2024-07-30 DIAGNOSIS — E11.65 TYPE 2 DIABETES MELLITUS WITH HYPERGLYCEMIA, WITH LONG-TERM CURRENT USE OF INSULIN: Primary | ICD-10-CM

## 2024-07-30 LAB
EXPIRATION DATE: ABNORMAL
EXPIRATION DATE: ABNORMAL
GLUCOSE BLDC GLUCOMTR-MCNC: 349 MG/DL (ref 70–130)
HBA1C MFR BLD: 9 % (ref 4.5–5.7)
Lab: ABNORMAL
Lab: ABNORMAL

## 2024-07-30 PROCEDURE — 83036 HEMOGLOBIN GLYCOSYLATED A1C: CPT | Performed by: INTERNAL MEDICINE

## 2024-07-30 PROCEDURE — 82570 ASSAY OF URINE CREATININE: CPT | Performed by: INTERNAL MEDICINE

## 2024-07-30 PROCEDURE — 82043 UR ALBUMIN QUANTITATIVE: CPT | Performed by: INTERNAL MEDICINE

## 2024-07-30 PROCEDURE — 1159F MED LIST DOCD IN RCRD: CPT | Performed by: INTERNAL MEDICINE

## 2024-07-30 PROCEDURE — 99214 OFFICE O/P EST MOD 30 MIN: CPT | Performed by: INTERNAL MEDICINE

## 2024-07-30 PROCEDURE — 3052F HG A1C>EQUAL 8.0%<EQUAL 9.0%: CPT | Performed by: INTERNAL MEDICINE

## 2024-07-30 PROCEDURE — 95251 CONT GLUC MNTR ANALYSIS I&R: CPT | Performed by: INTERNAL MEDICINE

## 2024-07-30 PROCEDURE — 1160F RVW MEDS BY RX/DR IN RCRD: CPT | Performed by: INTERNAL MEDICINE

## 2024-07-30 PROCEDURE — 3080F DIAST BP >= 90 MM HG: CPT | Performed by: INTERNAL MEDICINE

## 2024-07-30 PROCEDURE — 3077F SYST BP >= 140 MM HG: CPT | Performed by: INTERNAL MEDICINE

## 2024-07-30 NOTE — PROGRESS NOTES
"Chief Complaint   Patient presents with    Diabetes        HPI   Christi Chapa is a 63 y.o. female had concerns including Diabetes.      Patient was last seen May 2024.  Patient reports that glucose values have remained high.  She stopped insulin titration at 50 units and reports she has been at this dose for a while.  She does admit to reduced activity and intermittent poor diet.  She is monitoring glucose via freestyle kassandra.    Current diabetes medications include the following:  Lantus 50 units daily  Glimepiride 4 mg daily  Jardiance 10 mg daily  Metformin 1000 mg twice daily    Patient continues on Zetia, fenofibrate, rosuvastatin for hyperlipidemia.    The following portions of the patient's history were reviewed and updated as appropriate: allergies, current medications, and past social history.    Review of Systems   Constitutional:  Positive for activity change.   Endocrine: Positive for polydipsia and polyuria.      /94 (BP Location: Left arm, Patient Position: Sitting, Cuff Size: Adult)   Pulse 78   Ht 158.8 cm (62.5\")   Wt 75.3 kg (166 lb)   SpO2 94%   BMI 29.88 kg/m²      Physical Exam      Constitutional:  well developed; well nourished  no acute distress  appears stated age   ENT/Thyroid: not examined   Eyes: Conjunctiva: clear   Respiratory:  breathing is unlabored  clear to auscultation bilaterally   Cardiovascular:  regular rate and rhythm   Chest:  Not performed.   Abdomen: Not performed.   : Not performed.   Musculoskeletal: Not performed   Skin: not performed.   Neuro: mental status, speech normal   Psych: mood and affect are within normal limits       Labs/Imaging  Glucose:  Lab Results   Component Value Date    POCGLU 349 (A) 07/30/2024     HbA1c:  Lab Results   Component Value Date    HGBA1C 9.0 (A) 07/30/2024    HGBA1C 8.1 (A) 04/25/2024     Microalbumin:  Lab Results   Component Value Date    MICROALBUR 25.8 09/20/2021     Lipid Panel:  Lab Results   Component Value Date "    CHOL 92 06/26/2024    TRIG 146 06/26/2024    HDL 34 (L) 06/26/2024    LDL 33 06/26/2024    VLDL 25 06/26/2024    LDLHDL 0.85 06/26/2024     CMP:  Lab Results   Component Value Date    BUN 19 06/26/2024    CREATININE 0.76 06/26/2024    EGFRIFNONA 81 09/20/2021    EGFRIFAFRI 111 12/30/2014    BCR 25.0 06/26/2024     06/26/2024    K 4.4 06/26/2024    CO2 21.4 (L) 06/26/2024    CALCIUM 9.7 06/26/2024    PROTENTOTREF 6.9 06/05/2023    ALBUMIN 4.4 06/26/2024    LABGLOBREF 2.3 06/05/2023    LABIL2 2.0 06/05/2023    BILITOT 0.2 06/26/2024    ALKPHOS 51 06/26/2024    AST 22 06/26/2024    ALT 19 06/26/2024      Freestyle kassandra downloaded for review for dates ranging July 17 through July 30, 2024, CGM is active 71% of the time with average glucose 232, GMI 8.9% with 20.6% glucose variability.  14% of data is within target range, 52% high, 34% very high.  Patient is persistently hyperglycemic throughout the day.  This does worsen somewhat postprandially.  There is no pattern of hypoglycemia.    Diagnoses and all orders for this visit:    1. Type 2 diabetes mellitus with hyperglycemia, with long-term current use of insulin (Primary)  -     POC Glucose, Blood  -     POC Glycosylated Hemoglobin (Hb A1C)  -     Microalbumin / Creatinine Urine Ratio - Urine, Clean Catch; Future    2. Hyperlipidemia, unspecified hyperlipidemia type    Other orders  -     Insulin Glargine (LANTUS SOLOSTAR) 100 UNIT/ML injection pen; 60 units daily, titrate per instructions, MDD 80 units  Dispense: 75 mL; Refill: 1  -     empagliflozin (Jardiance) 25 MG tablet tablet; Take 1 tablet by mouth Daily.  Dispense: 30 tablet; Refill: 5    Diabetes is poorly controlled with hemoglobin A1c 9%.  This is worsened in comparison to prior.  Patient is hyperglycemic during office visit.  Worsening glycemic control is likely multifactorial and due to changes in both diet and activity level.  Plan to adjust medications.  Discussed importance of maintaining  hydration when glucose values elevated.  CGM containing 72 hours of glucose data was downloaded and reviewed.  Patient will increase Lantus to 60 units daily, she was given instructions to further increase by 2 units every 3 days until fasting glucose less than 150, max 80  Increase Jardiance to 25 mg daily, adverse effects were reviewed  Patient will continue glimepiride 4 mg daily  Patient will continue metformin 1000 mg twice daily  Discussed goals for glycemic control and recommendations for glucose monitoring  CMP, lipid panel were reviewed from June 2024  Recent LDL was at goal, 33, patient will continue Zetia, fenofibrate, rosuvastatin.  Urine microalbumin is due and was ordered today  Patient reports eye exam is scheduled for December 2024.    Return in about 3 months (around 10/30/2024). The patient was instructed to contact the clinic with any interval questions or concerns.    Electronically signed by: Sarah Amin MD   Endocrinologist    Dictated Utilizing Dragon Dictation

## 2024-07-31 LAB
ALBUMIN/CREAT UR: 125 MG/G CREAT (ref 0–29)
CREAT UR-MCNC: 44.6 MG/DL
MICROALBUMIN UR-MCNC: 55.8 UG/ML

## 2024-08-29 ENCOUNTER — OFFICE VISIT (OUTPATIENT)
Dept: FAMILY MEDICINE CLINIC | Facility: CLINIC | Age: 64
End: 2024-08-29
Payer: MEDICARE

## 2024-08-29 VITALS
TEMPERATURE: 98.6 F | SYSTOLIC BLOOD PRESSURE: 118 MMHG | HEART RATE: 65 BPM | DIASTOLIC BLOOD PRESSURE: 60 MMHG | BODY MASS INDEX: 29.66 KG/M2 | HEIGHT: 63 IN | WEIGHT: 167.4 LBS

## 2024-08-29 DIAGNOSIS — N30.01 ACUTE CYSTITIS WITH HEMATURIA: Primary | ICD-10-CM

## 2024-08-29 DIAGNOSIS — R35.0 URINARY FREQUENCY: ICD-10-CM

## 2024-08-29 LAB
BILIRUB BLD-MCNC: NEGATIVE MG/DL
CLARITY, POC: ABNORMAL
COLOR UR: ABNORMAL
EXPIRATION DATE: ABNORMAL
GLUCOSE UR STRIP-MCNC: ABNORMAL MG/DL
KETONES UR QL: NEGATIVE
LEUKOCYTE EST, POC: ABNORMAL
Lab: ABNORMAL
NITRITE UR-MCNC: NEGATIVE MG/ML
PH UR: 5.5 [PH] (ref 5–8)
PROT UR STRIP-MCNC: ABNORMAL MG/DL
RBC # UR STRIP: ABNORMAL /UL
SP GR UR: 1.01 (ref 1–1.03)
UROBILINOGEN UR QL: NORMAL

## 2024-08-29 PROCEDURE — 3074F SYST BP LT 130 MM HG: CPT | Performed by: FAMILY MEDICINE

## 2024-08-29 PROCEDURE — 99214 OFFICE O/P EST MOD 30 MIN: CPT | Performed by: FAMILY MEDICINE

## 2024-08-29 PROCEDURE — 3052F HG A1C>EQUAL 8.0%<EQUAL 9.0%: CPT | Performed by: FAMILY MEDICINE

## 2024-08-29 PROCEDURE — 1126F AMNT PAIN NOTED NONE PRSNT: CPT | Performed by: FAMILY MEDICINE

## 2024-08-29 PROCEDURE — 87086 URINE CULTURE/COLONY COUNT: CPT | Performed by: FAMILY MEDICINE

## 2024-08-29 PROCEDURE — 3078F DIAST BP <80 MM HG: CPT | Performed by: FAMILY MEDICINE

## 2024-08-29 PROCEDURE — 81003 URINALYSIS AUTO W/O SCOPE: CPT | Performed by: FAMILY MEDICINE

## 2024-08-29 RX ORDER — CIPROFLOXACIN 500 MG/1
500 TABLET, FILM COATED ORAL 2 TIMES DAILY
Qty: 6 TABLET | Refills: 0 | Status: SHIPPED | OUTPATIENT
Start: 2024-08-29 | End: 2024-09-01

## 2024-08-29 RX ORDER — FLUCONAZOLE 150 MG/1
150 TABLET ORAL EVERY OTHER DAY
Qty: 3 TABLET | Refills: 0 | Status: SHIPPED | OUTPATIENT
Start: 2024-08-29

## 2024-08-29 NOTE — ASSESSMENT & PLAN NOTE
Urinalysis in office today indicative of UTI  We will send urine for culture  Prescribing Cipro x 3 days for coverage  Will follow cultures and tailor antibiotics as appropriate  Advised continued adequate fluid hydration.

## 2024-08-29 NOTE — PROGRESS NOTES
"Chief Complaint  Urinary Tract Infection (Frequency and Burning with urination/)    Subjective        Christi Chapa presents to [facility]  Urinary Tract Infection   This is a new problem. The current episode started in the past 7 days. The problem occurs constantly. The quality of the pain is described as burning. The pain is moderate. There has been no fever. Associated symptoms include frequency, hematuria, sweats and urgency. Pertinent negatives include no chills or vomiting. She has tried increased fluids for the symptoms. The treatment provided moderate relief. There is no history of recurrent UTIs.           Objective   Vital Signs:  /60   Pulse 65   Temp 98.6 °F (37 °C) (Infrared)   Ht 158.8 cm (62.52\")   Wt 75.9 kg (167 lb 6.4 oz)   BMI 30.11 kg/m²   Estimated body mass index is 30.11 kg/m² as calculated from the following:    Height as of this encounter: 158.8 cm (62.52\").    Weight as of this encounter: 75.9 kg (167 lb 6.4 oz).            Physical Exam  Vitals reviewed.   Constitutional:       Appearance: Normal appearance.   Cardiovascular:      Rate and Rhythm: Normal rate and regular rhythm.   Pulmonary:      Effort: Pulmonary effort is normal.      Breath sounds: Normal breath sounds.   Skin:     Capillary Refill: Capillary refill takes less than 2 seconds.   Neurological:      General: No focal deficit present.      Mental Status: She is alert. Mental status is at baseline.        Result Review :       Assessment and Plan   Diagnoses and all orders for this visit:    1. Acute cystitis with hematuria (Primary)  Assessment & Plan:  Urinalysis in office today indicative of UTI  We will send urine for culture  Prescribing Cipro x 3 days for coverage  Will follow cultures and tailor antibiotics as appropriate  Advised continued adequate fluid hydration.      Orders:  -     Urine Culture - Urine, Urine, Clean Catch; Future  -     ciprofloxacin (Cipro) 500 MG " tablet; Take 1 tablet by mouth 2 (Two) Times a Day for 3 days.  Dispense: 6 tablet; Refill: 0  -     fluconazole (Diflucan) 150 MG tablet; Take 1 tablet by mouth Every Other Day. For a total of three dosage  Dispense: 3 tablet; Refill: 0    2. Urinary frequency  -     POC Urinalysis Dipstick, Automated             Follow Up   No follow-ups on file.  Patient was given instructions and counseling regarding her condition or for health maintenance advice. Please see specific information pulled into the AVS if appropriate.       This document has been electronically signed by Carmela Paez DO   August 29, 2024 14:12 EDT    Dictated Utilizing Dragon Dictation: Part of this note may be an electronic transcription/translation of spoken language to printed text using the Dragon Dictation System.    Carmela Paez D.O.  [facility]

## 2024-08-30 LAB — BACTERIA SPEC AEROBE CULT: NORMAL

## 2024-09-19 ENCOUNTER — TELEPHONE (OUTPATIENT)
Dept: FAMILY MEDICINE CLINIC | Facility: CLINIC | Age: 64
End: 2024-09-19
Payer: MEDICARE

## 2024-09-23 DIAGNOSIS — E61.1 IRON DEFICIENCY: Primary | ICD-10-CM

## 2024-09-23 RX ORDER — IRON POLYSACCHARIDE COMPLEX 150 MG
150 CAPSULE ORAL 2 TIMES DAILY
Qty: 180 CAPSULE | Refills: 3 | Status: SHIPPED | OUTPATIENT
Start: 2024-09-23

## 2024-10-28 DIAGNOSIS — E78.5 HYPERLIPIDEMIA LDL GOAL <70: Chronic | ICD-10-CM

## 2024-10-28 RX ORDER — FENOFIBRATE 145 MG/1
145 TABLET, COATED ORAL DAILY
Qty: 90 TABLET | Refills: 3 | Status: SHIPPED | OUTPATIENT
Start: 2024-10-28

## 2024-10-28 NOTE — TELEPHONE ENCOUNTER
"Caller: Christi Chapa \"Fariba\"    Relationship: Self    Best call back number: [phone]     Requested Prescriptions:   Requested Prescriptions     Pending Prescriptions Disp Refills    fenofibrate (TRICOR) 145 MG tablet 90 tablet 3     Sig: Take 1 tablet by mouth Daily.        Pharmacy where request should be sent: [facility] [id number] [address] AT [facility] - [phone] [facility] [phone] FX     Last office visit with prescribing clinician: 6/26/2024   Last telemedicine visit with prescribing clinician: Visit date not found   Next office visit with prescribing clinician: 12/30/2024     Additional details provided by patient:     Does the patient have less than a 3 day supply:  [] Yes  [x] No    Would you like a call back once the refill request has been completed: [] Yes [x] No    If the office needs to give you a call back, can they leave a voicemail: [] Yes [x] No    Arnaldo Noel Rep   10/28/24 12:46 EDT   "

## 2024-11-04 DIAGNOSIS — E78.5 HYPERLIPIDEMIA LDL GOAL <70: Chronic | ICD-10-CM

## 2024-11-04 RX ORDER — LEVOTHYROXINE SODIUM 25 UG/1
25 TABLET ORAL DAILY
Qty: 30 TABLET | Refills: 2 | Status: SHIPPED | OUTPATIENT
Start: 2024-11-04

## 2024-11-04 RX ORDER — EZETIMIBE 10 MG/1
10 TABLET ORAL DAILY
Qty: 90 TABLET | Refills: 3 | Status: SHIPPED | OUTPATIENT
Start: 2024-11-04

## 2024-11-04 NOTE — TELEPHONE ENCOUNTER
"Caller: Christi Chapa \"Fariba\"    Relationship: Self    Best call back number: [phone]     Requested Prescriptions:   Requested Prescriptions     Pending Prescriptions Disp Refills    levothyroxine (SYNTHROID, LEVOTHROID) 25 MCG tablet 30 tablet 3     Sig: Take 1 tablet by mouth Daily.    ezetimibe (ZETIA) 10 MG tablet 90 tablet 3     Sig: Take 1 tablet by mouth Daily.        Pharmacy where request should be sent: [facility] [id number] [address] AT [address]  [phone] [facility] [phone] FX     Last office visit with prescribing clinician: 6/26/2024   Last telemedicine visit with prescribing clinician: Visit date not found   Next office visit with prescribing clinician: 12/30/2024     Additional details provided by patient:     Does the patient have less than a 3 day supply:  [] Yes  [x] No    Would you like a call back once the refill request has been completed: [] Yes [x] No    If the office needs to give you a call back, can they leave a voicemail: [] Yes [x] No    Arnaldo Ferrari Rep   11/04/24 12:51 EST         "

## 2024-11-15 ENCOUNTER — OFFICE VISIT (OUTPATIENT)
Dept: ENDOCRINOLOGY | Facility: CLINIC | Age: 64
End: 2024-11-15
Payer: MEDICARE

## 2024-11-15 VITALS
SYSTOLIC BLOOD PRESSURE: 130 MMHG | HEIGHT: 63 IN | WEIGHT: 170 LBS | HEART RATE: 82 BPM | DIASTOLIC BLOOD PRESSURE: 70 MMHG | BODY MASS INDEX: 30.12 KG/M2

## 2024-11-15 DIAGNOSIS — Z79.4 TYPE 2 DIABETES MELLITUS WITH HYPERGLYCEMIA, WITH LONG-TERM CURRENT USE OF INSULIN: Primary | ICD-10-CM

## 2024-11-15 DIAGNOSIS — E78.5 HYPERLIPIDEMIA, UNSPECIFIED HYPERLIPIDEMIA TYPE: ICD-10-CM

## 2024-11-15 DIAGNOSIS — E11.65 TYPE 2 DIABETES MELLITUS WITH HYPERGLYCEMIA, WITH LONG-TERM CURRENT USE OF INSULIN: Primary | ICD-10-CM

## 2024-11-15 LAB
EXPIRATION DATE: ABNORMAL
EXPIRATION DATE: ABNORMAL
GLUCOSE BLDC GLUCOMTR-MCNC: 239 MG/DL (ref 70–130)
HBA1C MFR BLD: 8.6 % (ref 4.5–5.7)
Lab: ABNORMAL
Lab: ABNORMAL

## 2024-11-15 RX ORDER — GLIMEPIRIDE 4 MG/1
8 TABLET ORAL
Qty: 60 TABLET | Refills: 5 | Status: SHIPPED | OUTPATIENT
Start: 2024-11-15

## 2024-11-15 RX ORDER — INSULIN GLARGINE 300 U/ML
80 INJECTION, SOLUTION SUBCUTANEOUS DAILY
Qty: 9 ML | Refills: 5 | Status: SHIPPED | OUTPATIENT
Start: 2024-11-15

## 2024-11-15 NOTE — PROGRESS NOTES
"Chief Complaint   Patient presents with    Diabetes        HPI   Christi Chapa is a 63 y.o. female had concerns including Diabetes.      Patient was last seen in July 2024. Patient reports that she and her  will be moving to [place] in the near future.  She reports ongoing hyperglycemia following meals.  She reports that she does not cook and that her  will sometimes  items which are not good for her diabetes when they eat out.    Current diabetes medications include the following:  Metformin 1000 mg twice daily  Glimepiride 4 mg daily  Jardiance 25 mg daily  Lantus 80 units daily--patient reports this volume of insulin is painful to inject.    Patient previously did not tolerate Mounjaro, Ozempic    Patient is taking Tricor, Zetia, rosuvastatin for hyperlipidemia    The following portions of the patient's history were reviewed and updated as appropriate: allergies, current medications, and past social history.    Review of Systems   Constitutional:  Negative for activity change.        /70   Pulse 82   Ht 158.8 cm (62.52\")   Wt 77.1 kg (170 lb)   BMI 30.58 kg/m²      Physical Exam      Constitutional:  well developed; well nourished  no acute distress  obese - Body mass index is 30.58 kg/m².   ENT/Thyroid: not examined   Eyes: Conjunctiva: clear   Respiratory:  breathing is unlabored  clear to auscultation bilaterally   Cardiovascular:  regular rate and rhythm   Chest:  Not performed.   Abdomen: Not performed.   : Not performed.   Musculoskeletal: Not performed   Skin: not performed.   Neuro: mental status, speech normal   Psych: mood and affect are within normal limits       Labs/Imaging  A1C:  Lab Results   Component Value Date    HGBA1C 8.6 (A) 11/15/2024    HGBA1C 9.0 (A) 07/30/2024      Glucose:  Lab Results   Component Value Date    POCGLU 239 (A) 11/15/2024      CMP:  Lab Results   Component Value Date    GLUCOSE 203 (H) 06/26/2024    BUN 19 06/26/2024    CREATININE 0.76 " 06/26/2024     06/26/2024    K 4.4 06/26/2024     06/26/2024    CALCIUM 9.7 06/26/2024    PROTEINTOT 7.1 06/26/2024    ALBUMIN 4.4 06/26/2024    ALT 19 06/26/2024    AST 22 06/26/2024    ALKPHOS 51 06/26/2024    BILITOT 0.2 06/26/2024    GLOB 2.7 06/26/2024    AGRATIO 1.6 06/26/2024    BCR 25.0 06/26/2024    ANIONGAP 15.6 (H) 06/26/2024    EGFR 87.6 06/26/2024      Lipid Panel:  Lab Results   Component Value Date    CHOL 92 06/26/2024    CHLPL 140 06/05/2023    TRIG 146 06/26/2024    HDL 34 (L) 06/26/2024    LDL 33 06/26/2024      Urine Microalbumin:  Lab Results   Component Value Date    MICROALBUR 55.8 07/30/2024      TSH:  Lab Results   Component Value Date    TSH 1.320 06/26/2024       Freestyle kassandra downloaded for review for dates ranging November 2- November 15, 2024, CGM is active 46% of the time with average glucose 182, 28.1% glucose variability.  58% of data is within target range, 31% high, 11% very high.  Patient has baseline hyperglycemia which worsens postprandially.  Postprandial glucose elevation is most prominent following dinner.  There is no hypoglycemia.    Diagnoses and all orders for this visit:    1. Type 2 diabetes mellitus with hyperglycemia, with long-term current use of insulin (Primary)  -     POC Glucose, Blood  -     POC Glycosylated Hemoglobin (Hb A1C)  -     metFORMIN (GLUCOPHAGE) 1000 MG tablet; Take 1 tablet by mouth 2 (Two) Times a Day With Meals.  Dispense: 60 tablet; Refill: 5  Diabetes is poorly controlled with hemoglobin A1c 8.6%  Discussed with patient that she is currently at elevated risk of complications secondary to poorly controlled diabetes, discussed that complications include organ dysfunction and death.  Patient voiced understanding.  CGM containing 72 hours of glucose data was downloaded and reviewed.  Patient has postprandial glucose elevation.  This is most prominent following dinner.  Discussed options for improving glycemic control including  medication changes, lifestyle changes.  Increase glimepiride to 8 mg daily.  I did discuss with patient that I do not believe this will be adequate to control postprandial spikes.  Discussed reinitiation of short acting insulin with meals given postprandial hyperglycemia.  Patient refused.  Discussed importance of dietary changes, exercise and maintaining glycemic control.  Patient did not tolerate GLP-1 receptor agonist.  Change Lantus to Toujeo given patient reported discomfort with injecting high volume of insulin.  Continue 80 units daily  Patient will continue Jardiance 25 units daily  Patient will continue metformin 1000 mg twice daily.  Screening labs are up-to-date    2. Hyperlipidemia, unspecified hyperlipidemia type  Lipid panel is up-to-date with most recent LDL 33, patient will continue current lipid-lowering therapy.    Other orders  -     Insulin Glargine, 1 Unit Dial, (Toujeo SoloStar) 300 UNIT/ML solution pen-injector injection; Inject 80 Units under the skin into the appropriate area as directed Daily.  Dispense: 9 mL; Refill: 5  -     empagliflozin (Jardiance) 25 MG tablet tablet; Take 1 tablet by mouth Daily.  Dispense: 30 tablet; Refill: 5  -     glimepiride (AMARYL) 4 MG tablet; Take 2 tablets by mouth Every Morning Before Breakfast.  Dispense: 60 tablet; Refill: 5    Follow-up not scheduled as patient reports she is preparing to move out of [place].  The patient was instructed to contact the clinic with any interval questions or concerns.    Electronically signed by: Sarah Amin MD   Endocrinologist    Dictated Utilizing Dragon Dictation

## 2024-11-19 ENCOUNTER — HOSPITAL ENCOUNTER (OUTPATIENT)
Facility: HOSPITAL | Age: 64
Discharge: HOME | End: 2024-11-19
Payer: MEDICARE

## 2024-11-19 ENCOUNTER — LAB (OUTPATIENT)
Dept: LAB | Facility: HOSPITAL | Age: 64
End: 2024-11-19
Payer: MEDICARE

## 2024-11-19 ENCOUNTER — OFFICE VISIT (OUTPATIENT)
Dept: FAMILY MEDICINE CLINIC | Facility: CLINIC | Age: 64
End: 2024-11-19
Payer: MEDICARE

## 2024-11-19 VITALS
OXYGEN SATURATION: 96 % | HEART RATE: 74 BPM | SYSTOLIC BLOOD PRESSURE: 129 MMHG | HEIGHT: 63 IN | TEMPERATURE: 98.9 F | BODY MASS INDEX: 29.88 KG/M2 | DIASTOLIC BLOOD PRESSURE: 64 MMHG | WEIGHT: 168.6 LBS

## 2024-11-19 DIAGNOSIS — K21.9 GASTROESOPHAGEAL REFLUX DISEASE, UNSPECIFIED WHETHER ESOPHAGITIS PRESENT: ICD-10-CM

## 2024-11-19 DIAGNOSIS — E11.9 TYPE 2 DIABETES MELLITUS WITHOUT COMPLICATION, WITH LONG-TERM CURRENT USE OF INSULIN: ICD-10-CM

## 2024-11-19 DIAGNOSIS — R10.11 RIGHT UPPER QUADRANT ABDOMINAL PAIN: ICD-10-CM

## 2024-11-19 DIAGNOSIS — I10 ESSENTIAL HYPERTENSION: Chronic | ICD-10-CM

## 2024-11-19 DIAGNOSIS — R10.11 RIGHT UPPER QUADRANT ABDOMINAL PAIN: Primary | ICD-10-CM

## 2024-11-19 DIAGNOSIS — I25.119 CORONARY ARTERY DISEASE INVOLVING NATIVE CORONARY ARTERY OF NATIVE HEART WITH ANGINA PECTORIS: Chronic | ICD-10-CM

## 2024-11-19 DIAGNOSIS — Z79.4 TYPE 2 DIABETES MELLITUS WITHOUT COMPLICATION, WITH LONG-TERM CURRENT USE OF INSULIN: ICD-10-CM

## 2024-11-19 DIAGNOSIS — M79.7 FIBROMYALGIA: ICD-10-CM

## 2024-11-19 LAB
ALBUMIN SERPL-MCNC: 4.4 G/DL (ref 3.5–5.2)
ALBUMIN/GLOB SERPL: 1.6 G/DL
ALP SERPL-CCNC: 47 U/L (ref 39–117)
ALT SERPL W P-5'-P-CCNC: 28 U/L (ref 1–33)
ANION GAP SERPL CALCULATED.3IONS-SCNC: 12 MMOL/L (ref 5–15)
AST SERPL-CCNC: 29 U/L (ref 1–32)
BASOPHILS # BLD AUTO: 0.05 10*3/MM3 (ref 0–0.2)
BASOPHILS NFR BLD AUTO: 0.7 % (ref 0–1.5)
BILIRUB SERPL-MCNC: 0.3 MG/DL (ref 0–1.2)
BUN SERPL-MCNC: 20 MG/DL (ref 8–23)
BUN/CREAT SERPL: 26 (ref 7–25)
CALCIUM SPEC-SCNC: 10 MG/DL (ref 8.6–10.5)
CHLORIDE SERPL-SCNC: 102 MMOL/L (ref 98–107)
CHROMATIN AB SERPL-ACNC: <10 IU/ML (ref 0–14)
CO2 SERPL-SCNC: 24 MMOL/L (ref 22–29)
CREAT SERPL-MCNC: 0.77 MG/DL (ref 0.57–1)
CRP SERPL-MCNC: <0.3 MG/DL (ref 0–0.5)
DEPRECATED RDW RBC AUTO: 46.5 FL (ref 37–54)
EGFRCR SERPLBLD CKD-EPI 2021: 86.3 ML/MIN/1.73
EOSINOPHIL # BLD AUTO: 0.3 10*3/MM3 (ref 0–0.4)
EOSINOPHIL NFR BLD AUTO: 4.4 % (ref 0.3–6.2)
ERYTHROCYTE [DISTWIDTH] IN BLOOD BY AUTOMATED COUNT: 15.1 % (ref 12.3–15.4)
ERYTHROCYTE [SEDIMENTATION RATE] IN BLOOD: 11 MM/HR (ref 0–30)
GLOBULIN UR ELPH-MCNC: 2.8 GM/DL
GLUCOSE SERPL-MCNC: 121 MG/DL (ref 65–99)
HCT VFR BLD AUTO: 44.2 % (ref 34–46.6)
HGB BLD-MCNC: 13.3 G/DL (ref 12–15.9)
IMM GRANULOCYTES # BLD AUTO: 0.02 10*3/MM3 (ref 0–0.05)
IMM GRANULOCYTES NFR BLD AUTO: 0.3 % (ref 0–0.5)
LYMPHOCYTES # BLD AUTO: 2.29 10*3/MM3 (ref 0.7–3.1)
LYMPHOCYTES NFR BLD AUTO: 33.6 % (ref 19.6–45.3)
MCH RBC QN AUTO: 25.5 PG (ref 26.6–33)
MCHC RBC AUTO-ENTMCNC: 30.1 G/DL (ref 31.5–35.7)
MCV RBC AUTO: 84.8 FL (ref 79–97)
MONOCYTES # BLD AUTO: 0.51 10*3/MM3 (ref 0.1–0.9)
MONOCYTES NFR BLD AUTO: 7.5 % (ref 5–12)
NEUTROPHILS NFR BLD AUTO: 3.65 10*3/MM3 (ref 1.7–7)
NEUTROPHILS NFR BLD AUTO: 53.5 % (ref 42.7–76)
NRBC BLD AUTO-RTO: 0 /100 WBC (ref 0–0.2)
PLATELET # BLD AUTO: 274 10*3/MM3 (ref 140–450)
PMV BLD AUTO: 10.4 FL (ref 6–12)
POTASSIUM SERPL-SCNC: 4.9 MMOL/L (ref 3.5–5.2)
PROT SERPL-MCNC: 7.2 G/DL (ref 6–8.5)
RBC # BLD AUTO: 5.21 10*6/MM3 (ref 3.77–5.28)
SODIUM SERPL-SCNC: 138 MMOL/L (ref 136–145)
WBC NRBC COR # BLD AUTO: 6.82 10*3/MM3 (ref 3.4–10.8)

## 2024-11-19 PROCEDURE — 1159F MED LIST DOCD IN RCRD: CPT | Performed by: FAMILY MEDICINE

## 2024-11-19 PROCEDURE — 86431 RHEUMATOID FACTOR QUANT: CPT

## 2024-11-19 PROCEDURE — 3074F SYST BP LT 130 MM HG: CPT | Performed by: FAMILY MEDICINE

## 2024-11-19 PROCEDURE — 80053 COMPREHEN METABOLIC PANEL: CPT

## 2024-11-19 PROCEDURE — 86140 C-REACTIVE PROTEIN: CPT

## 2024-11-19 PROCEDURE — 3078F DIAST BP <80 MM HG: CPT | Performed by: FAMILY MEDICINE

## 2024-11-19 PROCEDURE — 85652 RBC SED RATE AUTOMATED: CPT

## 2024-11-19 PROCEDURE — 85025 COMPLETE CBC W/AUTO DIFF WBC: CPT

## 2024-11-19 PROCEDURE — 74176 CT ABD & PELVIS W/O CONTRAST: CPT

## 2024-11-19 PROCEDURE — 1125F AMNT PAIN NOTED PAIN PRSNT: CPT | Performed by: FAMILY MEDICINE

## 2024-11-19 PROCEDURE — 86038 ANTINUCLEAR ANTIBODIES: CPT

## 2024-11-19 PROCEDURE — 3052F HG A1C>EQUAL 8.0%<EQUAL 9.0%: CPT | Performed by: FAMILY MEDICINE

## 2024-11-19 PROCEDURE — 1160F RVW MEDS BY RX/DR IN RCRD: CPT | Performed by: FAMILY MEDICINE

## 2024-11-19 PROCEDURE — 99214 OFFICE O/P EST MOD 30 MIN: CPT | Performed by: FAMILY MEDICINE

## 2024-11-19 PROCEDURE — 36415 COLL VENOUS BLD VENIPUNCTURE: CPT

## 2024-11-19 RX ORDER — METOPROLOL SUCCINATE 100 MG/1
50 TABLET, EXTENDED RELEASE ORAL DAILY
COMMUNITY
Start: 2024-01-19 | End: 2025-01-18

## 2024-11-19 RX ORDER — MIRTAZAPINE 45 MG/1
45 TABLET, FILM COATED ORAL NIGHTLY
COMMUNITY
Start: 2024-11-04

## 2024-11-19 RX ORDER — LISINOPRIL 20 MG/1
20 TABLET ORAL DAILY
COMMUNITY
Start: 2024-11-11

## 2024-11-19 NOTE — PROGRESS NOTES
"Subjective   Christi Chapa is a 63 y.o. female.     History of Present Illness she is here after going to chiropractor on Thursday.  She had pain with right sided ribs.  Had pain in upper back  that caused pain in right upper abd.      She just had right kidney stone on right she had urologic procedure and nephrology removed stone.  Now she is having right sided right upper abd pain.  No nausea vomiting.  She does not report any chest pain or shortness of breath.  Pain with deep breaths.  Normal bowel movements no nausea or vomiting no fever or chills.    The following portions of the patient's history were reviewed and updated as appropriate: allergies, current medications, past family history, past medical history, past social history, past surgical history, and problem list.    Review of Systems    Objective     Vitals:    11/19/24 1135   BP: 129/64   Pulse: 74   Temp: 98.9 °F (37.2 °C)   TempSrc: Infrared   SpO2: 96%   Weight: 76.5 kg (168 lb 9.6 oz)   Height: 158.8 cm (62.52\")       Physical Exam  Vitals and nursing note reviewed.   Constitutional:       General: She is not in acute distress.     Appearance: She is well-developed. She is not diaphoretic.   HENT:      Head: Normocephalic and atraumatic.      Right Ear: External ear normal.      Left Ear: External ear normal.   Eyes:      General: Lids are normal. No scleral icterus.        Right eye: No discharge.         Left eye: No discharge.      Conjunctiva/sclera: Conjunctivae normal.      Pupils: Pupils are equal, round, and reactive to light.   Neck:      Thyroid: No thyroid mass or thyromegaly.      Vascular: No carotid bruit or JVD.      Trachea: No tracheal deviation.   Cardiovascular:      Rate and Rhythm: Normal rate and regular rhythm.      Heart sounds: Normal heart sounds. No murmur heard.     No friction rub. No gallop.   Pulmonary:      Effort: Pulmonary effort is normal. No respiratory distress.      Breath sounds: Normal breath sounds. No " decreased breath sounds, wheezing, rhonchi or rales.   Chest:      Chest wall: No tenderness.   Abdominal:      General: Bowel sounds are normal. There is no distension.      Palpations: Abdomen is soft. There is no mass.      Tenderness: There is abdominal tenderness. There is no guarding or rebound.      Hernia: No hernia is present.      Comments: She is got normal bowel sounds.  No CVA tenderness.  No overlying skin changes on the flank or the upper abdomen.  She does have some right upper quadrant tenderness to palpation   Musculoskeletal:         General: Normal range of motion.   Lymphadenopathy:      Cervical: No cervical adenopathy.      Upper Body:      Right upper body: No supraclavicular adenopathy.      Left upper body: No supraclavicular adenopathy.   Skin:     Findings: No bruising, erythema or rash.      Nails: There is no clubbing.   Neurological:      Mental Status: She is alert and oriented to person, place, and time.      Cranial Nerves: No cranial nerve deficit.      Deep Tendon Reflexes: Reflexes are normal and symmetric. Reflexes normal.   Psychiatric:         Speech: Speech normal.         Behavior: Behavior normal.         Thought Content: Thought content normal.         Judgment: Judgment normal.         Assessment & Plan     Problem List Items Addressed This Visit          Cardiac and Vasculature    Essential hypertension (Chronic)    Overview     Target blood pressure <130/80 mmHg         Relevant Medications    lisinopril (PRINIVIL,ZESTRIL) 20 MG tablet    metoprolol succinate XL (TOPROL-XL) 100 MG 24 hr tablet    Coronary artery disease involving native coronary artery of native heart with angina pectoris (Chronic)    Overview     CT chest (1/30/2020): Severe coronary artery calcifications.   Nuclear stress test (9/28/2021): No evidence of ischemia. LVEF >70%.          Relevant Medications    metoprolol succinate XL (TOPROL-XL) 100 MG 24 hr tablet       Endocrine and Metabolic    Type 2  diabetes mellitus, with long-term current use of insulin    Overview     Started insulin 2021             Gastrointestinal Abdominal     Gastroesophageal reflux disease    Right upper quadrant abdominal pain - Primary    Relevant Orders    CT Abdomen Pelvis Without Contrast    CBC & Differential    Comprehensive Metabolic Panel    Urinalysis With Culture If Indicated - Urine, Clean Catch     Will get a CT scan to look for pathology in the right upper quadrant.  I want a make sure that there is not inflammation around the kidney.  She just gave a urine specimen so we will going to see if we can get another urine to make sure she does not have signs of infection in the kidney.  Will get a stat CT scan today.  We will check labs to make sure she does not have an elevated white blood cell count make sure her kidney function and liver function testing is normal.  She does not have a gallbladder.  The patient was not able to give a urine specimen in the office because she had just voided in the waiting room prior to being roomed.  Will go ahead and get a CT scan stat.  If she has any chest pain or shortness of breath I have recommended that she go to the ER.

## 2024-11-20 ENCOUNTER — TELEPHONE (OUTPATIENT)
Dept: FAMILY MEDICINE CLINIC | Facility: CLINIC | Age: 64
End: 2024-11-20
Payer: MEDICARE

## 2024-11-20 NOTE — PROGRESS NOTES
Notify the patient that the results of the CT scan shows multiple tiny kidney stones in the right kidney from 1 to 5 mm.  No inflammation or stranding or swelling of the right kidney.  No evidence of a bowel obstruction.  All of her labs look acceptable.  If she is getting worsening pain or symptoms are worsening I would recommend that she be evaluated in the ER.  Or follow-up with her urologist

## 2024-11-20 NOTE — TELEPHONE ENCOUNTER
Hub to Relay    Called patient and left a message for patient to return call and sent to my chart account.    Notify the patient that the results of the CT scan shows multiple tiny kidney stones in the right kidney from 1 to 5 mm.  No inflammation or stranding or swelling of the right kidney.  No evidence of a bowel obstruction.  All of her labs look accepta  ble.  If she is getting worsening pain or symptoms are worsening I would recommend that she be evaluated in the ER.  Or follow-up with her urologist

## 2024-11-20 NOTE — PROGRESS NOTES
Notify the patient that the results of the CT scan shows multiple tiny kidney stones in the right kidney from 1 to 5 mm.  No inflammation or stranding or swelling of the right kidney.  No evidence of a bowel obstruction.  All of her labs look acceptable.  If she is getting worsening pain or symptoms are worsening I would recommend that she be evaluated in the ER.  Or follow-up with her urologist.

## 2024-11-20 NOTE — TELEPHONE ENCOUNTER
Hub to Relay  Called patient and left a message for patient to return call and sent through my chart account.    Notify the patient that the results of the CT scan shows multiple tiny kidney stones in the right kidney from 1 to 5 mm.  No inflammation or stranding or swelling of the right kidney.  No evidence of a bowel obstruction.  All of her labs look accepta  ble.  If she is getting worsening pain or symptoms are worsening I would recommend that she be evaluated in the ER.  Or follow-up with her urologist.

## 2024-11-21 LAB — ANA SER QL: NEGATIVE

## 2024-12-23 ENCOUNTER — TELEPHONE (OUTPATIENT)
Dept: ENDOCRINOLOGY | Facility: CLINIC | Age: 64
End: 2024-12-23
Payer: MEDICARE

## 2024-12-23 DIAGNOSIS — E11.65 TYPE 2 DIABETES MELLITUS WITH HYPERGLYCEMIA, WITH LONG-TERM CURRENT USE OF INSULIN: ICD-10-CM

## 2024-12-23 DIAGNOSIS — Z79.4 TYPE 2 DIABETES MELLITUS WITH HYPERGLYCEMIA, WITH LONG-TERM CURRENT USE OF INSULIN: ICD-10-CM

## 2024-12-23 NOTE — TELEPHONE ENCOUNTER
"  Caller: Christi Chapan \"Fariba\"    Relationship: Self    Best call back number:   Telephone Information:   Mobile [phone]       Requested Prescriptions:   Requested Prescriptions     Pending Prescriptions Disp Refills    metFORMIN (GLUCOPHAGE) 1000 MG tablet 60 tablet 5     Sig: Take 1 tablet by mouth 2 (Two) Times a Day With Meals.    Continuous Glucose Sensor (FreeStyle Gary 2 Sensor) misc 2 each 1     Si each by Other route Every 14 (Fourteen) Days.        Pharmacy where request should be sent:      Last office visit with prescribing clinician: 11/15/2024   Last telemedicine visit with prescribing clinician: Visit date not found   Next office visit with prescribing clinician: Visit date not found     Does the patient have less than a 3 day supply:  [] Yes  [x] No    Would you like a call back once the refill request has been completed: [x] Yes [] No    If the office needs to give you a call back, can they leave a voicemail: [x] Yes [] No    Arnaldo Condon Rep   24 12:24 EST         "

## 2025-02-11 NOTE — TELEPHONE ENCOUNTER
Rx Refill Note  Requested Prescriptions     Pending Prescriptions Disp Refills    empagliflozin (Jardiance) 25 MG tablet tablet 30 tablet 5     Sig: Take 1 tablet by mouth Daily.      Last office visit with prescribing clinician: 11/15/2024     Next office visit with prescribing clinician: Visit date not found     Nazanin Alicea MA  02/11/25, 11:16 EST

## 2025-04-14 RX ORDER — PEN NEEDLE, DIABETIC 32GX 5/32"
NEEDLE, DISPOSABLE MISCELLANEOUS
Qty: 100 EACH | Refills: 1 | OUTPATIENT
Start: 2025-04-14

## 2025-04-14 RX ORDER — PEN NEEDLE, DIABETIC 32GX 5/32"
NEEDLE, DISPOSABLE MISCELLANEOUS
Qty: 100 EACH | Refills: 0 | Status: SHIPPED | OUTPATIENT
Start: 2025-04-14

## 2025-04-14 NOTE — TELEPHONE ENCOUNTER
Rx Refill Note  Requested Prescriptions     Pending Prescriptions Disp Refills    [facility] Pen [place] 32G X 4 MM misc [Pharmacy Med Name: [facility] PEN NEEDLE 4MM X 32G] 100 each 1     Sig: USE WITH INSULIN UNDER THE SKIN AS DIRECTED FOUR TIMES A DAY      Last office visit with prescribing clinician: 11/15/2024     Next office visit with prescribing clinician: Visit date not found

## 2025-04-14 NOTE — TELEPHONE ENCOUNTER
Rx Refill Note  Requested Prescriptions     Pending Prescriptions Disp Refills    [facility] Pen [place] 32G X 4 MM misc [Pharmacy Med Name: [facility] PEN NEEDLE 4MM X 32G] 100 each 1     Sig: USE WITH INSULIN UNDER THE SKIN AS DIRECTED FOUR TIMES A DAY      Last office visit with prescribing clinician: 11/15/2024     Next office visit with prescribing clinician: Visit date not found     Nazanin Alicea MA  04/14/25, 11:03 EDT